# Patient Record
Sex: FEMALE | Race: WHITE | NOT HISPANIC OR LATINO | Employment: OTHER | URBAN - NONMETROPOLITAN AREA
[De-identification: names, ages, dates, MRNs, and addresses within clinical notes are randomized per-mention and may not be internally consistent; named-entity substitution may affect disease eponyms.]

---

## 2018-01-02 ENCOUNTER — TRANSCRIBE ORDERS (OUTPATIENT)
Dept: ADMINISTRATIVE | Facility: HOSPITAL | Age: 53
End: 2018-01-02

## 2018-01-02 DIAGNOSIS — E11.00 TYPE 2 DIABETES MELLITUS WITH HYPEROSMOLARITY WITHOUT COMA, WITH LONG-TERM CURRENT USE OF INSULIN (HCC): Primary | ICD-10-CM

## 2018-01-02 DIAGNOSIS — Z79.4 TYPE 2 DIABETES MELLITUS WITH HYPEROSMOLARITY WITHOUT COMA, WITH LONG-TERM CURRENT USE OF INSULIN (HCC): Primary | ICD-10-CM

## 2018-01-12 ENCOUNTER — TRANSCRIBE ORDERS (OUTPATIENT)
Dept: ADMINISTRATIVE | Facility: HOSPITAL | Age: 53
End: 2018-01-12

## 2018-01-12 DIAGNOSIS — E13.9 DIABETES 1.5, MANAGED AS TYPE 2 (HCC): Primary | ICD-10-CM

## 2018-01-16 ENCOUNTER — APPOINTMENT (OUTPATIENT)
Dept: DIABETES SERVICES | Facility: HOSPITAL | Age: 53
End: 2018-01-16

## 2018-01-18 ENCOUNTER — APPOINTMENT (OUTPATIENT)
Dept: DIABETES SERVICES | Facility: HOSPITAL | Age: 53
End: 2018-01-18

## 2018-01-24 ENCOUNTER — TRANSCRIBE ORDERS (OUTPATIENT)
Dept: ADMINISTRATIVE | Facility: HOSPITAL | Age: 53
End: 2018-01-24

## 2018-01-24 DIAGNOSIS — S46.012A TRAUMATIC TEAR OF LEFT ROTATOR CUFF, INITIAL ENCOUNTER: Primary | ICD-10-CM

## 2018-01-25 ENCOUNTER — HOSPITAL ENCOUNTER (OUTPATIENT)
Dept: MRI IMAGING | Facility: HOSPITAL | Age: 53
Discharge: HOME | End: 2018-01-25
Attending: ORTHOPAEDIC SURGERY | Admitting: ORTHOPAEDIC SURGERY

## 2018-01-25 DIAGNOSIS — S46.012A TRAUMATIC TEAR OF LEFT ROTATOR CUFF, INITIAL ENCOUNTER: ICD-10-CM

## 2018-01-25 PROCEDURE — 73221 MRI JOINT UPR EXTREM W/O DYE: CPT

## 2018-01-25 PROCEDURE — 73221 MRI JOINT UPR EXTREM W/O DYE: CPT | Performed by: RADIOLOGY

## 2018-05-31 ENCOUNTER — TRANSCRIBE ORDERS (OUTPATIENT)
Dept: ADMINISTRATIVE | Facility: HOSPITAL | Age: 53
End: 2018-05-31

## 2018-05-31 DIAGNOSIS — R74.8 ABNORMAL LEVELS OF OTHER SERUM ENZYMES: Primary | ICD-10-CM

## 2018-06-15 ENCOUNTER — HOSPITAL ENCOUNTER (OUTPATIENT)
Dept: ULTRASOUND IMAGING | Facility: HOSPITAL | Age: 53
Discharge: HOME | End: 2018-06-15

## 2018-09-17 ENCOUNTER — OFFICE VISIT (OUTPATIENT)
Dept: RETAIL CLINIC | Facility: CLINIC | Age: 53
End: 2018-09-17

## 2018-09-17 VITALS
DIASTOLIC BLOOD PRESSURE: 78 MMHG | TEMPERATURE: 99.5 F | WEIGHT: 292 LBS | RESPIRATION RATE: 18 BRPM | OXYGEN SATURATION: 98 % | BODY MASS INDEX: 47.13 KG/M2 | HEART RATE: 73 BPM | SYSTOLIC BLOOD PRESSURE: 114 MMHG

## 2018-09-17 DIAGNOSIS — J01.00 ACUTE MAXILLARY SINUSITIS, RECURRENCE NOT SPECIFIED: Primary | ICD-10-CM

## 2018-09-17 PROCEDURE — 99213 OFFICE O/P EST LOW 20 MIN: CPT | Performed by: NURSE PRACTITIONER

## 2018-09-17 RX ORDER — PAROXETINE HYDROCHLORIDE 20 MG/1
40 TABLET, FILM COATED ORAL EVERY MORNING
COMMUNITY
End: 2021-04-21

## 2018-09-17 RX ORDER — LORAZEPAM 1 MG/1
1 TABLET ORAL EVERY 8 HOURS PRN
Status: ON HOLD | COMMUNITY
End: 2019-10-24

## 2018-09-17 RX ORDER — PIOGLITAZONEHYDROCHLORIDE 30 MG/1
30 TABLET ORAL DAILY
Status: ON HOLD | COMMUNITY
End: 2019-10-24

## 2018-09-17 RX ORDER — AZITHROMYCIN 250 MG/1
TABLET, FILM COATED ORAL
Qty: 6 TABLET | Refills: 0 | Status: SHIPPED | OUTPATIENT
Start: 2018-09-17 | End: 2019-02-01

## 2018-09-17 RX ORDER — FLUTICASONE PROPIONATE 50 MCG
2 SPRAY, SUSPENSION (ML) NASAL DAILY
Qty: 1 BOTTLE | Refills: 0 | Status: SHIPPED | OUTPATIENT
Start: 2018-09-17 | End: 2018-10-17

## 2018-09-17 NOTE — PATIENT INSTRUCTIONS

## 2018-09-17 NOTE — PROGRESS NOTES
Subjective   Ivette Khalil is a 52 y.o. female.   Chief Complaint   Patient presents with   • URI      URI    This is a new problem. The problem has been gradually worsening. Maximum temperature: has not checked her temperature, but flet like she has had one. The fever has been present for 1 to 2 days. Associated symptoms include congestion (head/chest), coughing (non-productive) and headaches. Pertinent negatives include no chest pain, rash, rhinorrhea, sinus pain or sore throat. Treatments tried: antibiotics and steroid injection. The treatment provided no relief.        Ivtete Khalil  presents to [facility] with cc of cough, congestion for 2 weeks and has had chilling for couple of days.  Ivette says her PCP had given her a Rocephin injection along with a Steroid injection and 10 day script of Levaquin about 2 weeks ago and she is still not any better. Reviewed the [facility]. Immunizations are up to date. See ROS.    The following portions of the patient's history were reviewed and updated as appropriate: allergies, current medications, past family history, past medical history, past social history, past surgical history and problem list.    Review of Systems   Constitutional: Positive for chills and fever.   HENT: Positive for congestion (head/chest). Negative for rhinorrhea, sinus pain and sore throat.    Respiratory: Positive for cough (non-productive) and shortness of breath (on occassion). Negative for chest tightness.    Cardiovascular: Negative for chest pain.   Endocrine: Negative for cold intolerance.   Musculoskeletal: Negative for arthralgias.   Skin: Negative for rash.   Neurological: Positive for headaches.   Hematological: Negative for adenopathy.       Visit Vitals  /78 (BP Location: Right arm, Patient Position: Sitting)   Pulse 73   Temp 99.5 °F (37.5 °C) (Temporal Artery )   Resp 18   Wt 132 kg (292 lb)   SpO2 98%   BMI 47.13 kg/m²       Objective     Current Outpatient Prescriptions:   •  GLIMEPIRIDE  PO, Take  by mouth., Disp: , Rfl:   •  LORazepam (ATIVAN) 1 MG tablet, Take 1 mg by mouth Every 8 (Eight) Hours As Needed for Anxiety., Disp: , Rfl:   •  metoprolol succinate XL (TOPROL-XL) 50 MG 24 hr tablet, Take 50 mg by mouth daily., Disp: , Rfl:   •  PARoxetine (PAXIL) 20 MG tablet, Take 20 mg by mouth Every Morning., Disp: , Rfl:   •  pioglitazone (ACTOS) 30 MG tablet, Take 30 mg by mouth Daily., Disp: , Rfl:   •  azithromycin (ZITHROMAX Z-ABDIEL) 250 MG tablet, Take 2 tablets the first day, then 1 tablet daily for 4 days., Disp: 6 tablet, Rfl: 0  •  fluticasone (FLONASE) 50 MCG/ACT nasal spray, 2 sprays into the nostril(s) as directed by provider Daily for 30 days., Disp: 1 bottle, Rfl: 0  Allergies   Allergen Reactions   • Codeine Hives   • Penicillins Hives   • Prednisone Hives       Physical Exam   Constitutional: She is oriented to person, place, and time. She appears well-developed and well-nourished. No distress.   HENT:   Head: Normocephalic and atraumatic.   Right Ear: Tympanic membrane and external ear normal.   Left Ear: External ear normal. No tenderness. Tympanic membrane is bulging (fluid bubble noted). Tympanic membrane is not erythematous.   Nose: Mucosal edema present. Right sinus exhibits maxillary sinus tenderness. Right sinus exhibits no frontal sinus tenderness. Left sinus exhibits maxillary sinus tenderness. Left sinus exhibits no frontal sinus tenderness.   Mouth/Throat: Uvula is midline, oropharynx is clear and moist and mucous membranes are normal. No tonsillar abscesses.   Eyes: Pupils are equal, round, and reactive to light. Conjunctivae and EOM are normal.   Neck: Normal range of motion. Neck supple.   Cardiovascular: Normal rate, regular rhythm and normal heart sounds.    Pulmonary/Chest: Effort normal and breath sounds normal. No respiratory distress. She has no wheezes.   Abdominal: Soft. Bowel sounds are normal.   Musculoskeletal: Normal range of motion.   Lymphadenopathy:     She  has no cervical adenopathy.   Neurological: She is alert and oriented to person, place, and time.   Skin: Skin is warm and dry. No rash noted.   Psychiatric: She has a normal mood and affect. Her behavior is normal. Judgment and thought content normal.   Nursing note and vitals reviewed.      Lab Results (last 24 hours)     ** No results found for the last 24 hours. **          Assessment/Plan   Ivette was seen today for uri.    Diagnoses and all orders for this visit:    Acute maxillary sinusitis, recurrence not specified  -     azithromycin (ZITHROMAX Z-ABDIEL) 250 MG tablet; Take 2 tablets the first day, then 1 tablet daily for 4 days.  -     fluticasone (FLONASE) 50 MCG/ACT nasal spray; 2 sprays into the nostril(s) as directed by provider Daily for 30 days.

## 2019-02-01 ENCOUNTER — OFFICE VISIT (OUTPATIENT)
Dept: RETAIL CLINIC | Facility: CLINIC | Age: 54
End: 2019-02-01

## 2019-02-01 VITALS
BODY MASS INDEX: 47.61 KG/M2 | RESPIRATION RATE: 20 BRPM | HEART RATE: 69 BPM | TEMPERATURE: 98.5 F | SYSTOLIC BLOOD PRESSURE: 126 MMHG | DIASTOLIC BLOOD PRESSURE: 76 MMHG | OXYGEN SATURATION: 98 % | WEIGHT: 293 LBS

## 2019-02-01 DIAGNOSIS — J01.00 ACUTE MAXILLARY SINUSITIS, RECURRENCE NOT SPECIFIED: Primary | ICD-10-CM

## 2019-02-01 DIAGNOSIS — K12.0 APHTHOUS ULCER: ICD-10-CM

## 2019-02-01 PROCEDURE — 96372 THER/PROPH/DIAG INJ SC/IM: CPT | Performed by: NURSE PRACTITIONER

## 2019-02-01 PROCEDURE — 99213 OFFICE O/P EST LOW 20 MIN: CPT | Performed by: NURSE PRACTITIONER

## 2019-02-01 RX ORDER — BENZONATATE 100 MG/1
200 CAPSULE ORAL 3 TIMES DAILY PRN
Qty: 40 CAPSULE | Refills: 0 | Status: SHIPPED | OUTPATIENT
Start: 2019-02-01 | End: 2021-01-13

## 2019-02-01 RX ORDER — ONDANSETRON 4 MG/1
4 TABLET, ORALLY DISINTEGRATING ORAL EVERY 8 HOURS PRN
Qty: 12 TABLET | Refills: 0 | Status: SHIPPED | OUTPATIENT
Start: 2019-02-01 | End: 2020-09-17

## 2019-02-01 RX ORDER — AZITHROMYCIN 250 MG/1
TABLET, FILM COATED ORAL
Qty: 6 TABLET | Refills: 0 | Status: SHIPPED | OUTPATIENT
Start: 2019-02-01 | End: 2020-10-14

## 2019-02-01 RX ORDER — DIPHENHYDRAMINE, LIDOCAINE, NYSTATIN
5 KIT ORAL 4 TIMES DAILY
Qty: 100 ML | Refills: 0 | Status: SHIPPED | OUTPATIENT
Start: 2019-02-01 | End: 2019-02-06

## 2019-02-01 RX ORDER — CEFTRIAXONE 1 G/1
1 INJECTION, POWDER, FOR SOLUTION INTRAMUSCULAR; INTRAVENOUS ONCE
Status: COMPLETED | OUTPATIENT
Start: 2019-02-01 | End: 2019-02-01

## 2019-02-01 RX ADMIN — CEFTRIAXONE 1 G: 1 INJECTION, POWDER, FOR SOLUTION INTRAMUSCULAR; INTRAVENOUS at 08:30

## 2019-02-01 NOTE — PROGRESS NOTES
Subjective   Ivette Khalil is a 53 y.o. female.   Chief Complaint   Patient presents with   • URI      URI    This is a new problem. The current episode started in the past 7 days. The problem has been gradually worsening. There has been no fever. Associated symptoms include congestion, coughing, headaches, sinus pain and a sore throat. Associated symptoms comments: nausea. She has tried NSAIDs for the symptoms. The treatment provided no relief.        The following portions of the patient's history were reviewed and updated as appropriate: allergies, current medications, past family history, past medical history, past social history, past surgical history and problem list.    Review of Systems   Constitutional: Positive for chills and fatigue. Negative for fever.   HENT: Positive for congestion, postnasal drip, sinus pressure, sinus pain and sore throat.    Eyes: Negative.    Respiratory: Positive for cough.    Gastrointestinal: Negative.    Genitourinary: Negative.    Skin: Negative.    Allergic/Immunologic: Negative.    Neurological: Positive for headaches.   Psychiatric/Behavioral: Negative.    All other systems reviewed and are negative.      Objective   Allergies   Allergen Reactions   • Codeine Hives   • Penicillins Hives   • Prednisone Hives       Physical Exam   Constitutional: She is oriented to person, place, and time. She appears well-developed and well-nourished.   HENT:   Right Ear: A middle ear effusion is present.   Left Ear: A middle ear effusion is present.   Nose: Mucosal edema present. Right sinus exhibits maxillary sinus tenderness. Left sinus exhibits maxillary sinus tenderness.   Mouth/Throat: Posterior oropharyngeal erythema present. No oropharyngeal exudate.   Aphthous ulcers, mucoid PND   Eyes: Pupils are equal, round, and reactive to light.   Neck: Neck supple.   Cardiovascular: Normal rate and regular rhythm.   Pulmonary/Chest: Effort normal and breath sounds normal.   Lymphadenopathy:      She has no cervical adenopathy.   Neurological: She is alert and oriented to person, place, and time.   Skin: Skin is warm and dry. No rash noted.   Psychiatric: She has a normal mood and affect.   Vitals reviewed.      Assessment/Plan   Ivette was seen today for uri.    Diagnoses and all orders for this visit:    Acute maxillary sinusitis, recurrence not specified  -     cefTRIAXone (ROCEPHIN) injection 1 g; Inject 1 g into the appropriate muscle as directed by prescriber 1 (One) Time.    Aphthous ulcer    Other orders  -     azithromycin (ZITHROMAX) 250 MG tablet; Take 2 tablets the first day, then 1 tablet daily for 4 days.  -     ondansetron ODT (ZOFRAN-ODT) 4 MG disintegrating tablet; Take 1 tablet by mouth Every 8 (Eight) Hours As Needed for Nausea or Vomiting.  -     benzonatate (TESSALON) 100 MG capsule; Take 2 capsules by mouth 3 (Three) Times a Day As Needed for Cough.  -     nystatin susp + lidocaine viscous (MAGIC MOUTHWASH) oral suspension; Swish and swallow 5 mL 4 (Four) Times a Day for 5 days. Equal parts nystatin, lidocaine, maalox, and benadryl                 This document has been electronically signed by DAVI Corral February 1, 2019 9:16 AM

## 2019-09-16 ENCOUNTER — LAB (OUTPATIENT)
Dept: LAB | Facility: HOSPITAL | Age: 54
End: 2019-09-16

## 2019-09-16 ENCOUNTER — TRANSCRIBE ORDERS (OUTPATIENT)
Dept: ADMINISTRATIVE | Facility: HOSPITAL | Age: 54
End: 2019-09-16

## 2019-09-16 DIAGNOSIS — E11.8 TYPE 2 DIABETES MELLITUS WITH COMPLICATION, UNSPECIFIED WHETHER LONG TERM INSULIN USE: ICD-10-CM

## 2019-09-16 DIAGNOSIS — I10 HYPERTENSION, UNSPECIFIED TYPE: ICD-10-CM

## 2019-09-16 DIAGNOSIS — E78.5 HYPERLIPIDEMIA, UNSPECIFIED HYPERLIPIDEMIA TYPE: ICD-10-CM

## 2019-09-16 DIAGNOSIS — E03.9 HYPOTHYROIDISM, UNSPECIFIED TYPE: Primary | ICD-10-CM

## 2019-09-16 DIAGNOSIS — R53.82 CHRONIC FATIGUE, UNSPECIFIED: ICD-10-CM

## 2019-09-16 DIAGNOSIS — R53.83 FATIGUE, UNSPECIFIED TYPE: ICD-10-CM

## 2019-09-16 DIAGNOSIS — E03.9 HYPOTHYROIDISM, UNSPECIFIED TYPE: ICD-10-CM

## 2019-09-16 LAB
25(OH)D3 SERPL-MCNC: 32.7 NG/ML (ref 30–100)
ALBUMIN SERPL-MCNC: 4.1 G/DL (ref 3.5–5.2)
ALBUMIN UR-MCNC: <1.2 MG/DL
ALBUMIN/GLOB SERPL: 1.2 G/DL
ALP SERPL-CCNC: 147 U/L (ref 39–117)
ALT SERPL W P-5'-P-CCNC: 33 U/L (ref 1–33)
ANION GAP SERPL CALCULATED.3IONS-SCNC: 10.8 MMOL/L (ref 5–15)
AST SERPL-CCNC: 27 U/L (ref 1–32)
BASOPHILS # BLD AUTO: 0.04 10*3/MM3 (ref 0–0.2)
BASOPHILS NFR BLD AUTO: 0.5 % (ref 0–1.5)
BILIRUB SERPL-MCNC: 0.2 MG/DL (ref 0.2–1.2)
BILIRUB UR QL STRIP: NEGATIVE
BUN BLD-MCNC: 13 MG/DL (ref 6–20)
BUN/CREAT SERPL: 19.4 (ref 7–25)
CALCIUM SPEC-SCNC: 9.6 MG/DL (ref 8.6–10.5)
CHLORIDE SERPL-SCNC: 95 MMOL/L (ref 98–107)
CHOLEST SERPL-MCNC: 184 MG/DL (ref 0–200)
CLARITY UR: CLEAR
CO2 SERPL-SCNC: 27.2 MMOL/L (ref 22–29)
COLOR UR: YELLOW
CREAT BLD-MCNC: 0.67 MG/DL (ref 0.57–1)
CREAT UR-MCNC: 77.2 MG/DL
DEPRECATED RDW RBC AUTO: 48.6 FL (ref 37–54)
EOSINOPHIL # BLD AUTO: 0.18 10*3/MM3 (ref 0–0.4)
EOSINOPHIL NFR BLD AUTO: 2.3 % (ref 0.3–6.2)
ERYTHROCYTE [DISTWIDTH] IN BLOOD BY AUTOMATED COUNT: 15.3 % (ref 12.3–15.4)
GFR SERPL CREATININE-BSD FRML MDRD: 91 ML/MIN/1.73
GLOBULIN UR ELPH-MCNC: 3.5 GM/DL
GLUCOSE BLD-MCNC: 284 MG/DL (ref 65–99)
GLUCOSE UR STRIP-MCNC: ABNORMAL MG/DL
HBA1C MFR BLD: 9.54 % (ref 4.8–5.6)
HCT VFR BLD AUTO: 50.1 % (ref 34–46.6)
HDLC SERPL-MCNC: 41 MG/DL (ref 40–60)
HGB BLD-MCNC: 15.2 G/DL (ref 12–15.9)
HGB UR QL STRIP.AUTO: NEGATIVE
IMM GRANULOCYTES # BLD AUTO: 0.04 10*3/MM3 (ref 0–0.05)
IMM GRANULOCYTES NFR BLD AUTO: 0.5 % (ref 0–0.5)
KETONES UR QL STRIP: NEGATIVE
LDLC SERPL CALC-MCNC: 106 MG/DL (ref 0–100)
LDLC/HDLC SERPL: 2.6 {RATIO}
LEUKOCYTE ESTERASE UR QL STRIP.AUTO: NEGATIVE
LYMPHOCYTES # BLD AUTO: 2.1 10*3/MM3 (ref 0.7–3.1)
LYMPHOCYTES NFR BLD AUTO: 26.3 % (ref 19.6–45.3)
MCH RBC QN AUTO: 26.7 PG (ref 26.6–33)
MCHC RBC AUTO-ENTMCNC: 30.3 G/DL (ref 31.5–35.7)
MCV RBC AUTO: 87.9 FL (ref 79–97)
MICROALBUMIN/CREAT UR: NORMAL MG/G{CREAT}
MONOCYTES # BLD AUTO: 0.53 10*3/MM3 (ref 0.1–0.9)
MONOCYTES NFR BLD AUTO: 6.6 % (ref 5–12)
NEUTROPHILS # BLD AUTO: 5.08 10*3/MM3 (ref 1.7–7)
NEUTROPHILS NFR BLD AUTO: 63.8 % (ref 42.7–76)
NITRITE UR QL STRIP: NEGATIVE
NRBC BLD AUTO-RTO: 0 /100 WBC (ref 0–0.2)
PH UR STRIP.AUTO: 5.5 [PH] (ref 5–8)
PLATELET # BLD AUTO: 308 10*3/MM3 (ref 140–450)
PMV BLD AUTO: 9.5 FL (ref 6–12)
POTASSIUM BLD-SCNC: 4.4 MMOL/L (ref 3.5–5.2)
PROT SERPL-MCNC: 7.6 G/DL (ref 6–8.5)
PROT UR QL STRIP: NEGATIVE
RBC # BLD AUTO: 5.7 10*6/MM3 (ref 3.77–5.28)
SODIUM BLD-SCNC: 133 MMOL/L (ref 136–145)
SP GR UR STRIP: 1.03 (ref 1–1.03)
T3 SERPL-MCNC: 132 NG/DL (ref 80–200)
T4 SERPL-MCNC: 8.34 MCG/DL (ref 4.5–11.7)
TRIGL SERPL-MCNC: 183 MG/DL (ref 0–150)
TSH SERPL DL<=0.05 MIU/L-ACNC: 2.83 UIU/ML (ref 0.27–4.2)
UROBILINOGEN UR QL STRIP: ABNORMAL
VLDLC SERPL-MCNC: 36.6 MG/DL (ref 5–40)
WBC NRBC COR # BLD: 7.97 10*3/MM3 (ref 3.4–10.8)

## 2019-09-16 PROCEDURE — 82043 UR ALBUMIN QUANTITATIVE: CPT

## 2019-09-16 PROCEDURE — 85025 COMPLETE CBC W/AUTO DIFF WBC: CPT

## 2019-09-16 PROCEDURE — 80053 COMPREHEN METABOLIC PANEL: CPT

## 2019-09-16 PROCEDURE — 36415 COLL VENOUS BLD VENIPUNCTURE: CPT

## 2019-09-16 PROCEDURE — 80061 LIPID PANEL: CPT

## 2019-09-16 PROCEDURE — 81003 URINALYSIS AUTO W/O SCOPE: CPT

## 2019-09-16 PROCEDURE — 83036 HEMOGLOBIN GLYCOSYLATED A1C: CPT

## 2019-09-16 PROCEDURE — 82570 ASSAY OF URINE CREATININE: CPT

## 2019-09-16 PROCEDURE — 82306 VITAMIN D 25 HYDROXY: CPT

## 2019-09-16 PROCEDURE — 84436 ASSAY OF TOTAL THYROXINE: CPT

## 2019-09-16 PROCEDURE — 84480 ASSAY TRIIODOTHYRONINE (T3): CPT

## 2019-09-16 PROCEDURE — 84443 ASSAY THYROID STIM HORMONE: CPT

## 2019-10-23 ENCOUNTER — ANESTHESIA EVENT (OUTPATIENT)
Dept: PERIOP | Facility: HOSPITAL | Age: 54
End: 2019-10-23

## 2019-10-23 RX ORDER — SODIUM CHLORIDE 0.9 % (FLUSH) 0.9 %
3-10 SYRINGE (ML) INJECTION AS NEEDED
Status: CANCELLED | OUTPATIENT
Start: 2019-10-23

## 2019-10-23 RX ORDER — SODIUM CHLORIDE 0.9 % (FLUSH) 0.9 %
3 SYRINGE (ML) INJECTION EVERY 12 HOURS SCHEDULED
Status: CANCELLED | OUTPATIENT
Start: 2019-10-23

## 2019-10-24 ENCOUNTER — HOSPITAL ENCOUNTER (OUTPATIENT)
Facility: HOSPITAL | Age: 54
Discharge: HOME | End: 2019-10-25
Attending: SURGERY | Admitting: SURGERY

## 2019-10-24 ENCOUNTER — ANESTHESIA (OUTPATIENT)
Dept: PERIOP | Facility: HOSPITAL | Age: 54
End: 2019-10-24

## 2019-10-24 DIAGNOSIS — E04.2 MULTIPLE THYROID NODULES: ICD-10-CM

## 2019-10-24 LAB
GLUCOSE BLD-MCNC: 462 MG/DL (ref 65–99)
GLUCOSE BLDC GLUCOMTR-MCNC: 163 MG/DL (ref 70–130)
GLUCOSE BLDC GLUCOMTR-MCNC: 172 MG/DL (ref 70–130)
GLUCOSE BLDC GLUCOMTR-MCNC: 209 MG/DL (ref 70–130)
GLUCOSE BLDC GLUCOMTR-MCNC: 374 MG/DL (ref 70–130)
GLUCOSE BLDC GLUCOMTR-MCNC: 436 MG/DL (ref 70–130)
GLUCOSE BLDC GLUCOMTR-MCNC: 462 MG/DL (ref 70–130)
GLUCOSE BLDC GLUCOMTR-MCNC: 469 MG/DL (ref 70–130)
GLUCOSE BLDC GLUCOMTR-MCNC: 529 MG/DL (ref 70–130)

## 2019-10-24 PROCEDURE — 93010 ELECTROCARDIOGRAM REPORT: CPT | Performed by: INTERNAL MEDICINE

## 2019-10-24 PROCEDURE — 63710000001 INSULIN DETEMIR PER 5 UNITS: Performed by: SURGERY

## 2019-10-24 PROCEDURE — 25010000003 CEFAZOLIN 1-4 GM/50ML-% SOLUTION: Performed by: NURSE ANESTHETIST, CERTIFIED REGISTERED

## 2019-10-24 PROCEDURE — 82947 ASSAY GLUCOSE BLOOD QUANT: CPT

## 2019-10-24 PROCEDURE — 25010000002 FENTANYL CITRATE (PF) 100 MCG/2ML SOLUTION: Performed by: NURSE ANESTHETIST, CERTIFIED REGISTERED

## 2019-10-24 PROCEDURE — 25010000002 DEXAMETHASONE PER 1 MG: Performed by: NURSE ANESTHETIST, CERTIFIED REGISTERED

## 2019-10-24 PROCEDURE — 63710000001 INSULIN LISPRO (HUMAN) PER 5 UNITS

## 2019-10-24 PROCEDURE — G0378 HOSPITAL OBSERVATION PER HR: HCPCS

## 2019-10-24 PROCEDURE — 88307 TISSUE EXAM BY PATHOLOGIST: CPT | Performed by: SURGERY

## 2019-10-24 PROCEDURE — 25010000002 ONDANSETRON PER 1 MG: Performed by: NURSE ANESTHETIST, CERTIFIED REGISTERED

## 2019-10-24 PROCEDURE — 63710000001 INSULIN LISPRO (HUMAN) PER 5 UNITS: Performed by: SURGERY

## 2019-10-24 PROCEDURE — 82962 GLUCOSE BLOOD TEST: CPT

## 2019-10-24 PROCEDURE — 25010000002 PROPOFOL 10 MG/ML EMULSION: Performed by: NURSE ANESTHETIST, CERTIFIED REGISTERED

## 2019-10-24 PROCEDURE — 25010000002 NEOSTIGMINE 10 MG/10ML SOLUTION: Performed by: NURSE ANESTHETIST, CERTIFIED REGISTERED

## 2019-10-24 PROCEDURE — 25010000002 HYDROMORPHONE PER 4 MG: Performed by: NURSE ANESTHETIST, CERTIFIED REGISTERED

## 2019-10-24 PROCEDURE — 93005 ELECTROCARDIOGRAM TRACING: CPT | Performed by: ANESTHESIOLOGY

## 2019-10-24 PROCEDURE — 25010000002 PROMETHAZINE PER 50 MG: Performed by: NURSE ANESTHETIST, CERTIFIED REGISTERED

## 2019-10-24 DEVICE — CLIP LIGAT VASC HORIZON TI MD BLU 6CT: Type: IMPLANTABLE DEVICE | Site: NECK | Status: FUNCTIONAL

## 2019-10-24 DEVICE — CLIP LIGAT VASC HORIZON TI SM YEL 6CT: Type: IMPLANTABLE DEVICE | Site: NECK | Status: FUNCTIONAL

## 2019-10-24 RX ORDER — ROCURONIUM BROMIDE 10 MG/ML
INJECTION, SOLUTION INTRAVENOUS AS NEEDED
Status: DISCONTINUED | OUTPATIENT
Start: 2019-10-24 | End: 2019-10-24 | Stop reason: SURG

## 2019-10-24 RX ORDER — FAMOTIDINE 20 MG/1
20 TABLET, FILM COATED ORAL
Status: DISCONTINUED | OUTPATIENT
Start: 2019-10-24 | End: 2019-10-24 | Stop reason: HOSPADM

## 2019-10-24 RX ORDER — MAGNESIUM HYDROXIDE 1200 MG/15ML
LIQUID ORAL AS NEEDED
Status: DISCONTINUED | OUTPATIENT
Start: 2019-10-24 | End: 2019-10-24 | Stop reason: HOSPADM

## 2019-10-24 RX ORDER — HYDRALAZINE HYDROCHLORIDE 20 MG/ML
5 INJECTION INTRAMUSCULAR; INTRAVENOUS
Status: DISCONTINUED | OUTPATIENT
Start: 2019-10-24 | End: 2019-10-24 | Stop reason: HOSPADM

## 2019-10-24 RX ORDER — FENTANYL CITRATE 50 UG/ML
50 INJECTION, SOLUTION INTRAMUSCULAR; INTRAVENOUS
Status: DISCONTINUED | OUTPATIENT
Start: 2019-10-24 | End: 2019-10-24 | Stop reason: HOSPADM

## 2019-10-24 RX ORDER — GLYCOPYRROLATE 0.2 MG/ML
INJECTION INTRAMUSCULAR; INTRAVENOUS AS NEEDED
Status: DISCONTINUED | OUTPATIENT
Start: 2019-10-24 | End: 2019-10-24 | Stop reason: SURG

## 2019-10-24 RX ORDER — PAROXETINE HYDROCHLORIDE 20 MG/1
40 TABLET, FILM COATED ORAL EVERY MORNING
Status: DISCONTINUED | OUTPATIENT
Start: 2019-10-24 | End: 2019-10-25 | Stop reason: HOSPADM

## 2019-10-24 RX ORDER — LIDOCAINE HYDROCHLORIDE 10 MG/ML
INJECTION, SOLUTION EPIDURAL; INFILTRATION; INTRACAUDAL; PERINEURAL AS NEEDED
Status: DISCONTINUED | OUTPATIENT
Start: 2019-10-24 | End: 2019-10-24 | Stop reason: SURG

## 2019-10-24 RX ORDER — SODIUM CHLORIDE, SODIUM LACTATE, POTASSIUM CHLORIDE, CALCIUM CHLORIDE 600; 310; 30; 20 MG/100ML; MG/100ML; MG/100ML; MG/100ML
9 INJECTION, SOLUTION INTRAVENOUS CONTINUOUS PRN
Status: DISCONTINUED | OUTPATIENT
Start: 2019-10-24 | End: 2019-10-24 | Stop reason: HOSPADM

## 2019-10-24 RX ORDER — PROMETHAZINE HYDROCHLORIDE 25 MG/ML
6.25 INJECTION, SOLUTION INTRAMUSCULAR; INTRAVENOUS ONCE AS NEEDED
Status: COMPLETED | OUTPATIENT
Start: 2019-10-24 | End: 2019-10-24

## 2019-10-24 RX ORDER — IPRATROPIUM BROMIDE AND ALBUTEROL SULFATE 2.5; .5 MG/3ML; MG/3ML
3 SOLUTION RESPIRATORY (INHALATION) ONCE AS NEEDED
Status: DISCONTINUED | OUTPATIENT
Start: 2019-10-24 | End: 2019-10-24 | Stop reason: HOSPADM

## 2019-10-24 RX ORDER — NICOTINE POLACRILEX 4 MG
15 LOZENGE BUCCAL
Status: DISCONTINUED | OUTPATIENT
Start: 2019-10-24 | End: 2019-10-25 | Stop reason: HOSPADM

## 2019-10-24 RX ORDER — METOPROLOL SUCCINATE 50 MG/1
50 TABLET, EXTENDED RELEASE ORAL NIGHTLY
Status: DISCONTINUED | OUTPATIENT
Start: 2019-10-24 | End: 2019-10-25 | Stop reason: HOSPADM

## 2019-10-24 RX ORDER — HYDROMORPHONE HYDROCHLORIDE 1 MG/ML
0.5 INJECTION, SOLUTION INTRAMUSCULAR; INTRAVENOUS; SUBCUTANEOUS
Status: DISCONTINUED | OUTPATIENT
Start: 2019-10-24 | End: 2019-10-24 | Stop reason: HOSPADM

## 2019-10-24 RX ORDER — PROMETHAZINE HYDROCHLORIDE 25 MG/1
25 SUPPOSITORY RECTAL ONCE AS NEEDED
Status: COMPLETED | OUTPATIENT
Start: 2019-10-24 | End: 2019-10-24

## 2019-10-24 RX ORDER — PROPOFOL 10 MG/ML
VIAL (ML) INTRAVENOUS CONTINUOUS PRN
Status: DISCONTINUED | OUTPATIENT
Start: 2019-10-24 | End: 2019-10-24 | Stop reason: SURG

## 2019-10-24 RX ORDER — NEOSTIGMINE METHYLSULFATE 1 MG/ML
INJECTION, SOLUTION INTRAVENOUS AS NEEDED
Status: DISCONTINUED | OUTPATIENT
Start: 2019-10-24 | End: 2019-10-24 | Stop reason: SURG

## 2019-10-24 RX ORDER — ONDANSETRON 4 MG/1
4 TABLET, ORALLY DISINTEGRATING ORAL EVERY 8 HOURS PRN
Status: DISCONTINUED | OUTPATIENT
Start: 2019-10-24 | End: 2019-10-25 | Stop reason: HOSPADM

## 2019-10-24 RX ORDER — ONDANSETRON 2 MG/ML
4 INJECTION INTRAMUSCULAR; INTRAVENOUS ONCE AS NEEDED
Status: COMPLETED | OUTPATIENT
Start: 2019-10-24 | End: 2019-10-24

## 2019-10-24 RX ORDER — FENTANYL CITRATE 50 UG/ML
INJECTION, SOLUTION INTRAMUSCULAR; INTRAVENOUS AS NEEDED
Status: DISCONTINUED | OUTPATIENT
Start: 2019-10-24 | End: 2019-10-24 | Stop reason: SURG

## 2019-10-24 RX ORDER — SODIUM CHLORIDE 0.9 % (FLUSH) 0.9 %
3-10 SYRINGE (ML) INJECTION AS NEEDED
Status: DISCONTINUED | OUTPATIENT
Start: 2019-10-24 | End: 2019-10-25 | Stop reason: HOSPADM

## 2019-10-24 RX ORDER — ONDANSETRON 2 MG/ML
4 INJECTION INTRAMUSCULAR; INTRAVENOUS ONCE AS NEEDED
Status: DISCONTINUED | OUTPATIENT
Start: 2019-10-24 | End: 2019-10-24 | Stop reason: HOSPADM

## 2019-10-24 RX ORDER — LABETALOL HYDROCHLORIDE 5 MG/ML
5 INJECTION, SOLUTION INTRAVENOUS
Status: DISCONTINUED | OUTPATIENT
Start: 2019-10-24 | End: 2019-10-24 | Stop reason: HOSPADM

## 2019-10-24 RX ORDER — CEFAZOLIN SODIUM 1 G/50ML
INJECTION, SOLUTION INTRAVENOUS AS NEEDED
Status: DISCONTINUED | OUTPATIENT
Start: 2019-10-24 | End: 2019-10-24 | Stop reason: SURG

## 2019-10-24 RX ORDER — PANTOPRAZOLE SODIUM 40 MG/1
40 TABLET, DELAYED RELEASE ORAL DAILY
Status: ON HOLD | COMMUNITY
End: 2021-01-18

## 2019-10-24 RX ORDER — HEPARIN SODIUM 5000 [USP'U]/ML
5000 INJECTION, SOLUTION INTRAVENOUS; SUBCUTANEOUS EVERY 8 HOURS SCHEDULED
Status: DISCONTINUED | OUTPATIENT
Start: 2019-10-25 | End: 2019-10-25 | Stop reason: HOSPADM

## 2019-10-24 RX ORDER — SODIUM CHLORIDE, SODIUM LACTATE, POTASSIUM CHLORIDE, CALCIUM CHLORIDE 600; 310; 30; 20 MG/100ML; MG/100ML; MG/100ML; MG/100ML
INJECTION, SOLUTION INTRAVENOUS CONTINUOUS PRN
Status: DISCONTINUED | OUTPATIENT
Start: 2019-10-24 | End: 2019-10-24 | Stop reason: SURG

## 2019-10-24 RX ORDER — SODIUM CHLORIDE 0.9 % (FLUSH) 0.9 %
3 SYRINGE (ML) INJECTION EVERY 12 HOURS SCHEDULED
Status: DISCONTINUED | OUTPATIENT
Start: 2019-10-24 | End: 2019-10-25 | Stop reason: HOSPADM

## 2019-10-24 RX ORDER — LIDOCAINE HYDROCHLORIDE 10 MG/ML
0.5 INJECTION, SOLUTION EPIDURAL; INFILTRATION; INTRACAUDAL; PERINEURAL ONCE AS NEEDED
Status: COMPLETED | OUTPATIENT
Start: 2019-10-24 | End: 2019-10-24

## 2019-10-24 RX ORDER — PROMETHAZINE HYDROCHLORIDE 25 MG/1
25 TABLET ORAL ONCE AS NEEDED
Status: COMPLETED | OUTPATIENT
Start: 2019-10-24 | End: 2019-10-24

## 2019-10-24 RX ORDER — DEXTROSE MONOHYDRATE 25 G/50ML
25 INJECTION, SOLUTION INTRAVENOUS
Status: DISCONTINUED | OUTPATIENT
Start: 2019-10-24 | End: 2019-10-25 | Stop reason: HOSPADM

## 2019-10-24 RX ORDER — PANTOPRAZOLE SODIUM 40 MG/1
40 TABLET, DELAYED RELEASE ORAL
Status: DISCONTINUED | OUTPATIENT
Start: 2019-10-24 | End: 2019-10-25 | Stop reason: HOSPADM

## 2019-10-24 RX ORDER — DEXAMETHASONE SODIUM PHOSPHATE 4 MG/ML
INJECTION, SOLUTION INTRA-ARTICULAR; INTRALESIONAL; INTRAMUSCULAR; INTRAVENOUS; SOFT TISSUE AS NEEDED
Status: DISCONTINUED | OUTPATIENT
Start: 2019-10-24 | End: 2019-10-24 | Stop reason: SURG

## 2019-10-24 RX ORDER — HYDROCODONE BITARTRATE AND ACETAMINOPHEN 7.5; 325 MG/1; MG/1
1 TABLET ORAL EVERY 4 HOURS PRN
Status: DISCONTINUED | OUTPATIENT
Start: 2019-10-24 | End: 2019-10-25 | Stop reason: HOSPADM

## 2019-10-24 RX ORDER — PROPOFOL 10 MG/ML
VIAL (ML) INTRAVENOUS AS NEEDED
Status: DISCONTINUED | OUTPATIENT
Start: 2019-10-24 | End: 2019-10-24 | Stop reason: SURG

## 2019-10-24 RX ORDER — ONDANSETRON 2 MG/ML
INJECTION INTRAMUSCULAR; INTRAVENOUS AS NEEDED
Status: DISCONTINUED | OUTPATIENT
Start: 2019-10-24 | End: 2019-10-24 | Stop reason: SURG

## 2019-10-24 RX ORDER — MEPERIDINE HYDROCHLORIDE 25 MG/ML
12.5 INJECTION INTRAMUSCULAR; INTRAVENOUS; SUBCUTANEOUS
Status: DISCONTINUED | OUTPATIENT
Start: 2019-10-24 | End: 2019-10-24 | Stop reason: HOSPADM

## 2019-10-24 RX ADMIN — CEFAZOLIN SODIUM 1 G: 1 INJECTION, SOLUTION INTRAVENOUS at 08:18

## 2019-10-24 RX ADMIN — LIDOCAINE HYDROCHLORIDE 0.2 ML: 10 INJECTION, SOLUTION EPIDURAL; INFILTRATION; INTRACAUDAL; PERINEURAL at 06:55

## 2019-10-24 RX ADMIN — LIDOCAINE HYDROCHLORIDE 50 MG: 10 INJECTION, SOLUTION EPIDURAL; INFILTRATION; INTRACAUDAL; PERINEURAL at 08:10

## 2019-10-24 RX ADMIN — PROPOFOL 200 MG: 10 INJECTION, EMULSION INTRAVENOUS at 08:10

## 2019-10-24 RX ADMIN — SODIUM CHLORIDE, POTASSIUM CHLORIDE, SODIUM LACTATE AND CALCIUM CHLORIDE: 600; 310; 30; 20 INJECTION, SOLUTION INTRAVENOUS at 09:15

## 2019-10-24 RX ADMIN — INSULIN LISPRO 3 UNITS: 100 INJECTION, SOLUTION INTRAVENOUS; SUBCUTANEOUS at 13:00

## 2019-10-24 RX ADMIN — HYDROMORPHONE HYDROCHLORIDE 0.5 MG: 1 INJECTION, SOLUTION INTRAMUSCULAR; INTRAVENOUS; SUBCUTANEOUS at 09:46

## 2019-10-24 RX ADMIN — HYDROCODONE BITARTRATE AND ACETAMINOPHEN 1 TABLET: 7.5; 325 TABLET ORAL at 11:17

## 2019-10-24 RX ADMIN — FENTANYL CITRATE 50 MCG: 0.05 INJECTION, SOLUTION INTRAMUSCULAR; INTRAVENOUS at 09:39

## 2019-10-24 RX ADMIN — ONDANSETRON 4 MG: 2 INJECTION INTRAMUSCULAR; INTRAVENOUS at 09:13

## 2019-10-24 RX ADMIN — FENTANYL CITRATE 50 MCG: 50 INJECTION, SOLUTION INTRAMUSCULAR; INTRAVENOUS at 08:10

## 2019-10-24 RX ADMIN — HYDROCODONE BITARTRATE AND ACETAMINOPHEN 1 TABLET: 7.5; 325 TABLET ORAL at 19:23

## 2019-10-24 RX ADMIN — DEXAMETHASONE SODIUM PHOSPHATE 8 MG: 4 INJECTION, SOLUTION INTRAMUSCULAR; INTRAVENOUS at 08:19

## 2019-10-24 RX ADMIN — INSULIN LISPRO 7 UNITS: 100 INJECTION, SOLUTION INTRAVENOUS; SUBCUTANEOUS at 16:55

## 2019-10-24 RX ADMIN — NEOSTIGMINE METHYLSULFATE 3 MG: 1 INJECTION, SOLUTION INTRAVENOUS at 09:13

## 2019-10-24 RX ADMIN — PROPOFOL 25 MCG/KG/MIN: 10 INJECTION, EMULSION INTRAVENOUS at 08:22

## 2019-10-24 RX ADMIN — INSULIN DETEMIR 22 UNITS: 100 INJECTION, SOLUTION SUBCUTANEOUS at 22:26

## 2019-10-24 RX ADMIN — ROCURONIUM BROMIDE 50 MG: 10 INJECTION INTRAVENOUS at 08:10

## 2019-10-24 RX ADMIN — METOPROLOL SUCCINATE 50 MG: 50 TABLET, EXTENDED RELEASE ORAL at 22:21

## 2019-10-24 RX ADMIN — SODIUM CHLORIDE, POTASSIUM CHLORIDE, SODIUM LACTATE AND CALCIUM CHLORIDE 9 ML/HR: 600; 310; 30; 20 INJECTION, SOLUTION INTRAVENOUS at 06:55

## 2019-10-24 RX ADMIN — PANTOPRAZOLE SODIUM 40 MG: 40 TABLET, DELAYED RELEASE ORAL at 11:17

## 2019-10-24 RX ADMIN — HYDROCODONE BITARTRATE AND ACETAMINOPHEN 1 TABLET: 7.5; 325 TABLET ORAL at 23:40

## 2019-10-24 RX ADMIN — PAROXETINE HYDROCHLORIDE 40 MG: 20 TABLET, FILM COATED ORAL at 11:17

## 2019-10-24 RX ADMIN — HYDROCODONE BITARTRATE AND ACETAMINOPHEN 1 TABLET: 7.5; 325 TABLET ORAL at 15:08

## 2019-10-24 RX ADMIN — SODIUM CHLORIDE, PRESERVATIVE FREE 3 ML: 5 INJECTION INTRAVENOUS at 22:23

## 2019-10-24 RX ADMIN — PROMETHAZINE HYDROCHLORIDE 6.25 MG: 25 INJECTION INTRAMUSCULAR; INTRAVENOUS at 09:47

## 2019-10-24 RX ADMIN — ONDANSETRON 4 MG: 2 INJECTION INTRAMUSCULAR; INTRAVENOUS at 09:38

## 2019-10-24 RX ADMIN — INSULIN LISPRO 14 UNITS: 100 INJECTION, SOLUTION INTRAVENOUS; SUBCUTANEOUS at 22:23

## 2019-10-24 RX ADMIN — SODIUM CHLORIDE, POTASSIUM CHLORIDE, SODIUM LACTATE AND CALCIUM CHLORIDE: 600; 310; 30; 20 INJECTION, SOLUTION INTRAVENOUS at 08:04

## 2019-10-24 RX ADMIN — GLYCOPYRROLATE 0.4 MG: 0.2 INJECTION, SOLUTION INTRAMUSCULAR; INTRAVENOUS at 09:13

## 2019-10-24 RX ADMIN — FENTANYL CITRATE 50 MCG: 0.05 INJECTION, SOLUTION INTRAMUSCULAR; INTRAVENOUS at 10:27

## 2019-10-24 RX ADMIN — FAMOTIDINE 20 MG: 20 TABLET, FILM COATED ORAL at 07:11

## 2019-10-24 RX ADMIN — FENTANYL CITRATE 50 MCG: 50 INJECTION, SOLUTION INTRAMUSCULAR; INTRAVENOUS at 08:42

## 2019-10-24 NOTE — ANESTHESIA PREPROCEDURE EVALUATION
Anesthesia Evaluation     Patient summary reviewed and Nursing notes reviewed   NPO Solid Status: > 8 hours  NPO Liquid Status: > 2 hours           Airway   Mallampati: III  TM distance: >3 FB  Neck ROM: limited  Possible difficult intubation  Dental      Pulmonary    (+) sleep apnea,   (-) COPD, asthma, shortness of breath, not a smoker  Cardiovascular     ECG reviewed  Patient on routine beta blocker    (+) hypertension, hyperlipidemia,   (-) past MI, CAD, dysrhythmias, angina    ROS comment: EKG NSR  with SA IMI  Anterior infarct     ECHO EF = 60%. Wall contracts normally.  LVDD (grade I) consistent with impaired relaxation.    CATH No obstructive CAD. Arteries smooth in caliber s atherosclerosis, EF 71% no wall motion abnormalities    Neuro/Psych  (+) headaches,     (-) seizures, CVA  GI/Hepatic/Renal/Endo    (+) morbid obesity, GERD,  diabetes mellitus type 2 using insulin,   (-)  obesity, liver disease, no renal disease, hypothyroidism    Musculoskeletal     (+) back pain,   Abdominal    Substance History      OB/GYN          Other   (+) arthritis     ROS/Med Hx Other:                  Anesthesia Plan    ASA 3     general   (Propofol Infusion as part of Anti PONV tech )  intravenous induction   Anesthetic plan, all risks, benefits, and alternatives have been provided, discussed and informed consent has been obtained with: patient.

## 2019-10-24 NOTE — ANESTHESIA PROCEDURE NOTES
Airway  Urgency: elective    Airway not difficult    General Information and Staff    Patient location during procedure: OR  CRNA: Naila Duque CRNA    Indications and Patient Condition  Indications for airway management: airway protection    Preoxygenated: yes  MILS not maintained throughout  Mask difficulty assessment: 1 - vent by mask    Final Airway Details  Final airway type: endotracheal airway      Successful airway: ETT  Cuffed: yes   Successful intubation technique: direct laryngoscopy  Facilitating devices/methods: intubating stylet  Endotracheal tube insertion site: oral  Blade: Delgado  Blade size: 2  ETT size (mm): 7.0  Cormack-Lehane Classification: grade I - full view of glottis  Placement verified by: chest auscultation and capnometry   Measured from: lips  ETT/EBT  to lips (cm): 20  Number of attempts at approach: 1    Additional Comments  Negative epigastric sounds, Breath sound equal bilaterally with symmetric chest rise and fall.  Atraumatic, teeth intact

## 2019-10-24 NOTE — ANESTHESIA POSTPROCEDURE EVALUATION
Patient: Ivette Khalil    Procedure Summary     Date:  10/24/19 Room / Location:   [facility] / [facility]    Anesthesia Start:  0804 Anesthesia Stop:  0936    Procedure:  THYROIDECTOMY TOTAL (N/A Neck) Diagnosis:      Surgeon:  Layo Perera MD Provider:  Sam Thompson MD    Anesthesia Type:  general ASA Status:  3          Anesthesia Type: general  Last vitals  BP   151/84   Temp   97.0   Pulse   82   Resp   16     SpO2   100     Post Anesthesia Care and Evaluation    Patient location during evaluation: PACU  Patient participation: complete - patient participated  Level of consciousness: awake and alert  Pain score: 0  Pain management: adequate  Airway patency: patent  Anesthetic complications: No anesthetic complications  PONV Status: none  Cardiovascular status: hemodynamically stable and acceptable  Respiratory status: nonlabored ventilation, acceptable and nasal cannula  Hydration status: acceptable

## 2019-10-25 VITALS
RESPIRATION RATE: 18 BRPM | OXYGEN SATURATION: 96 % | HEART RATE: 78 BPM | WEIGHT: 268 LBS | SYSTOLIC BLOOD PRESSURE: 128 MMHG | TEMPERATURE: 97.9 F | HEIGHT: 66 IN | BODY MASS INDEX: 43.07 KG/M2 | DIASTOLIC BLOOD PRESSURE: 62 MMHG

## 2019-10-25 LAB
CA-I SERPL ISE-MCNC: 1.26 MMOL/L (ref 1.12–1.32)
CYTO UR: NORMAL
GLUCOSE BLDC GLUCOMTR-MCNC: 263 MG/DL (ref 70–130)
LAB AP CASE REPORT: NORMAL
LAB AP CLINICAL INFORMATION: NORMAL
PATH REPORT.FINAL DX SPEC: NORMAL
PATH REPORT.GROSS SPEC: NORMAL

## 2019-10-25 PROCEDURE — G0378 HOSPITAL OBSERVATION PER HR: HCPCS

## 2019-10-25 PROCEDURE — 82962 GLUCOSE BLOOD TEST: CPT

## 2019-10-25 PROCEDURE — 63710000001 INSULIN DETEMIR PER 5 UNITS: Performed by: SURGERY

## 2019-10-25 PROCEDURE — 63710000001 INSULIN LISPRO (HUMAN) PER 5 UNITS: Performed by: SURGERY

## 2019-10-25 PROCEDURE — 25010000002 HEPARIN (PORCINE) PER 1000 UNITS: Performed by: SURGERY

## 2019-10-25 PROCEDURE — 82330 ASSAY OF CALCIUM: CPT | Performed by: SURGERY

## 2019-10-25 RX ORDER — LEVOTHYROXINE SODIUM 0.15 MG/1
150 TABLET ORAL DAILY
Qty: 30 TABLET | Refills: 11 | Status: SHIPPED | OUTPATIENT
Start: 2019-10-25 | End: 2019-10-26 | Stop reason: SDUPTHER

## 2019-10-25 RX ADMIN — HYDROCODONE BITARTRATE AND ACETAMINOPHEN 1 TABLET: 7.5; 325 TABLET ORAL at 05:22

## 2019-10-25 RX ADMIN — HEPARIN SODIUM 5000 UNITS: 5000 INJECTION, SOLUTION INTRAVENOUS; SUBCUTANEOUS at 05:22

## 2019-10-25 RX ADMIN — INSULIN DETEMIR 22 UNITS: 100 INJECTION, SOLUTION SUBCUTANEOUS at 08:28

## 2019-10-25 RX ADMIN — HYDROCODONE BITARTRATE AND ACETAMINOPHEN 1 TABLET: 7.5; 325 TABLET ORAL at 09:27

## 2019-10-25 RX ADMIN — PAROXETINE HYDROCHLORIDE 40 MG: 20 TABLET, FILM COATED ORAL at 05:23

## 2019-10-25 RX ADMIN — INSULIN LISPRO 8 UNITS: 100 INJECTION, SOLUTION INTRAVENOUS; SUBCUTANEOUS at 08:27

## 2019-10-25 RX ADMIN — PANTOPRAZOLE SODIUM 40 MG: 40 TABLET, DELAYED RELEASE ORAL at 05:22

## 2019-10-26 RX ORDER — LEVOTHYROXINE SODIUM 0.15 MG/1
150 TABLET ORAL DAILY
Qty: 30 TABLET | Refills: 10 | Status: SHIPPED | OUTPATIENT
Start: 2019-10-26 | End: 2020-10-25

## 2019-12-31 ENCOUNTER — TRANSCRIBE ORDERS (OUTPATIENT)
Dept: ADMINISTRATIVE | Facility: HOSPITAL | Age: 54
End: 2019-12-31

## 2019-12-31 ENCOUNTER — LAB (OUTPATIENT)
Dept: LAB | Facility: HOSPITAL | Age: 54
End: 2019-12-31

## 2019-12-31 DIAGNOSIS — E66.9 OBESITY, UNSPECIFIED CLASSIFICATION, UNSPECIFIED OBESITY TYPE, UNSPECIFIED WHETHER SERIOUS COMORBIDITY PRESENT: ICD-10-CM

## 2019-12-31 DIAGNOSIS — E11.65 TYPE II DIABETES MELLITUS WITH HYPEROSMOLARITY, UNCONTROLLED (HCC): Primary | ICD-10-CM

## 2019-12-31 DIAGNOSIS — I10 HYPERTENSION, UNSPECIFIED TYPE: Primary | ICD-10-CM

## 2019-12-31 DIAGNOSIS — E11.9 TYPE 2 DIABETES MELLITUS WITHOUT COMPLICATION, UNSPECIFIED WHETHER LONG TERM INSULIN USE (HCC): ICD-10-CM

## 2019-12-31 DIAGNOSIS — E11.65 TYPE II DIABETES MELLITUS WITH HYPEROSMOLARITY, UNCONTROLLED (HCC): ICD-10-CM

## 2019-12-31 DIAGNOSIS — I25.10 CORONARY ARTERIOSCLEROSIS: ICD-10-CM

## 2019-12-31 DIAGNOSIS — E04.2 NONTOXIC MULTINODULAR GOITER: ICD-10-CM

## 2019-12-31 DIAGNOSIS — E11.00 TYPE II DIABETES MELLITUS WITH HYPEROSMOLARITY, UNCONTROLLED (HCC): ICD-10-CM

## 2019-12-31 DIAGNOSIS — E03.9 HYPOTHYROIDISM, ADULT: ICD-10-CM

## 2019-12-31 DIAGNOSIS — I10 HYPERTENSION, UNSPECIFIED TYPE: ICD-10-CM

## 2019-12-31 DIAGNOSIS — E11.00 TYPE II DIABETES MELLITUS WITH HYPEROSMOLARITY, UNCONTROLLED (HCC): Primary | ICD-10-CM

## 2019-12-31 PROCEDURE — 84443 ASSAY THYROID STIM HORMONE: CPT

## 2019-12-31 PROCEDURE — 84439 ASSAY OF FREE THYROXINE: CPT

## 2019-12-31 PROCEDURE — 84480 ASSAY TRIIODOTHYRONINE (T3): CPT

## 2019-12-31 PROCEDURE — 83036 HEMOGLOBIN GLYCOSYLATED A1C: CPT

## 2019-12-31 PROCEDURE — 82570 ASSAY OF URINE CREATININE: CPT

## 2019-12-31 PROCEDURE — 82043 UR ALBUMIN QUANTITATIVE: CPT

## 2019-12-31 PROCEDURE — 81001 URINALYSIS AUTO W/SCOPE: CPT

## 2019-12-31 PROCEDURE — 36415 COLL VENOUS BLD VENIPUNCTURE: CPT

## 2019-12-31 PROCEDURE — 80053 COMPREHEN METABOLIC PANEL: CPT

## 2019-12-31 PROCEDURE — 85025 COMPLETE CBC W/AUTO DIFF WBC: CPT

## 2019-12-31 PROCEDURE — 80061 LIPID PANEL: CPT

## 2020-01-01 LAB
ALBUMIN SERPL-MCNC: 3.9 G/DL (ref 3.5–5.2)
ALBUMIN UR-MCNC: <1.2 MG/DL
ALBUMIN/GLOB SERPL: 1.1 G/DL
ALP SERPL-CCNC: 143 U/L (ref 39–117)
ALT SERPL W P-5'-P-CCNC: 25 U/L (ref 1–33)
ANION GAP SERPL CALCULATED.3IONS-SCNC: 12 MMOL/L (ref 5–15)
AST SERPL-CCNC: 15 U/L (ref 1–32)
BACTERIA UR QL AUTO: ABNORMAL /HPF
BASOPHILS # BLD AUTO: 0.04 10*3/MM3 (ref 0–0.2)
BASOPHILS NFR BLD AUTO: 0.6 % (ref 0–1.5)
BILIRUB SERPL-MCNC: 0.2 MG/DL (ref 0.2–1.2)
BILIRUB UR QL STRIP: NEGATIVE
BUN BLD-MCNC: 10 MG/DL (ref 6–20)
BUN/CREAT SERPL: 16.7 (ref 7–25)
CALCIUM SPEC-SCNC: 8.9 MG/DL (ref 8.6–10.5)
CHLORIDE SERPL-SCNC: 93 MMOL/L (ref 98–107)
CHOLEST SERPL-MCNC: 198 MG/DL (ref 0–200)
CLARITY UR: CLEAR
CO2 SERPL-SCNC: 25 MMOL/L (ref 22–29)
COLOR UR: YELLOW
CREAT BLD-MCNC: 0.6 MG/DL (ref 0.57–1)
CREAT UR-MCNC: 88.9 MG/DL
DEPRECATED RDW RBC AUTO: 40.5 FL (ref 37–54)
EOSINOPHIL # BLD AUTO: 0.14 10*3/MM3 (ref 0–0.4)
EOSINOPHIL NFR BLD AUTO: 2 % (ref 0.3–6.2)
ERYTHROCYTE [DISTWIDTH] IN BLOOD BY AUTOMATED COUNT: 13.6 % (ref 12.3–15.4)
GFR SERPL CREATININE-BSD FRML MDRD: 103 ML/MIN/1.73
GLOBULIN UR ELPH-MCNC: 3.6 GM/DL
GLUCOSE BLD-MCNC: 246 MG/DL (ref 65–99)
GLUCOSE UR STRIP-MCNC: ABNORMAL MG/DL
HBA1C MFR BLD: 9.8 % (ref 4.8–5.6)
HCT VFR BLD AUTO: 43.2 % (ref 34–46.6)
HDLC SERPL-MCNC: 45 MG/DL (ref 40–60)
HGB BLD-MCNC: 14.1 G/DL (ref 12–15.9)
HGB UR QL STRIP.AUTO: NEGATIVE
HYALINE CASTS UR QL AUTO: ABNORMAL /LPF
IMM GRANULOCYTES # BLD AUTO: 0.03 10*3/MM3 (ref 0–0.05)
IMM GRANULOCYTES NFR BLD AUTO: 0.4 % (ref 0–0.5)
KETONES UR QL STRIP: NEGATIVE
LDLC SERPL CALC-MCNC: 115 MG/DL (ref 0–100)
LDLC/HDLC SERPL: 2.55 {RATIO}
LEUKOCYTE ESTERASE UR QL STRIP.AUTO: ABNORMAL
LYMPHOCYTES # BLD AUTO: 2.1 10*3/MM3 (ref 0.7–3.1)
LYMPHOCYTES NFR BLD AUTO: 30 % (ref 19.6–45.3)
MCH RBC QN AUTO: 27 PG (ref 26.6–33)
MCHC RBC AUTO-ENTMCNC: 32.6 G/DL (ref 31.5–35.7)
MCV RBC AUTO: 82.8 FL (ref 79–97)
MICROALBUMIN/CREAT UR: NORMAL MG/G{CREAT}
MONOCYTES # BLD AUTO: 0.41 10*3/MM3 (ref 0.1–0.9)
MONOCYTES NFR BLD AUTO: 5.9 % (ref 5–12)
NEUTROPHILS # BLD AUTO: 4.27 10*3/MM3 (ref 1.7–7)
NEUTROPHILS NFR BLD AUTO: 61.1 % (ref 42.7–76)
NITRITE UR QL STRIP: NEGATIVE
NRBC BLD AUTO-RTO: 0 /100 WBC (ref 0–0.2)
PH UR STRIP.AUTO: 5.5 [PH] (ref 5–8)
PLATELET # BLD AUTO: 282 10*3/MM3 (ref 140–450)
PMV BLD AUTO: 9.8 FL (ref 6–12)
POTASSIUM BLD-SCNC: 4.1 MMOL/L (ref 3.5–5.2)
PROT SERPL-MCNC: 7.5 G/DL (ref 6–8.5)
PROT UR QL STRIP: NEGATIVE
RBC # BLD AUTO: 5.22 10*6/MM3 (ref 3.77–5.28)
RBC # UR: ABNORMAL /HPF
REF LAB TEST METHOD: ABNORMAL
SODIUM BLD-SCNC: 130 MMOL/L (ref 136–145)
SP GR UR STRIP: 1.02 (ref 1–1.03)
SQUAMOUS #/AREA URNS HPF: ABNORMAL /HPF
T3 SERPL-MCNC: 112 NG/DL (ref 80–200)
T4 FREE SERPL-MCNC: 1.55 NG/DL (ref 0.93–1.7)
T4 SERPL-MCNC: 9.77 MCG/DL (ref 4.5–11.7)
TRIGL SERPL-MCNC: 192 MG/DL (ref 0–150)
TSH SERPL DL<=0.05 MIU/L-ACNC: 1.59 UIU/ML (ref 0.27–4.2)
UROBILINOGEN UR QL STRIP: ABNORMAL
VLDLC SERPL-MCNC: 38.4 MG/DL (ref 5–40)
WBC NRBC COR # BLD: 6.99 10*3/MM3 (ref 3.4–10.8)
WBC UR QL AUTO: ABNORMAL /HPF

## 2020-04-15 ENCOUNTER — HOSPITAL ENCOUNTER (OUTPATIENT)
Dept: MRI IMAGING | Facility: HOSPITAL | Age: 55
Discharge: HOME | End: 2020-04-15
Admitting: NURSE PRACTITIONER

## 2020-04-15 ENCOUNTER — TRANSCRIBE ORDERS (OUTPATIENT)
Dept: ADMINISTRATIVE | Facility: HOSPITAL | Age: 55
End: 2020-04-15

## 2020-04-15 DIAGNOSIS — M25.512 LEFT SHOULDER PAIN, UNSPECIFIED CHRONICITY: Primary | ICD-10-CM

## 2020-04-15 DIAGNOSIS — M25.512 LEFT SHOULDER PAIN, UNSPECIFIED CHRONICITY: ICD-10-CM

## 2020-04-15 PROCEDURE — 73221 MRI JOINT UPR EXTREM W/O DYE: CPT

## 2020-04-15 PROCEDURE — 73221 MRI JOINT UPR EXTREM W/O DYE: CPT | Performed by: RADIOLOGY

## 2020-04-20 ENCOUNTER — OFFICE VISIT (OUTPATIENT)
Dept: ORTHOPEDIC SURGERY | Facility: CLINIC | Age: 55
End: 2020-04-20

## 2020-04-20 VITALS — WEIGHT: 271.8 LBS | BODY MASS INDEX: 43.68 KG/M2 | HEIGHT: 66 IN | HEART RATE: 64 BPM | OXYGEN SATURATION: 98 %

## 2020-04-20 DIAGNOSIS — M25.512 LEFT SHOULDER PAIN, UNSPECIFIED CHRONICITY: Primary | ICD-10-CM

## 2020-04-20 DIAGNOSIS — S46.012A RUPTURE OF LEFT SUPRASPINATUS TENDON, INITIAL ENCOUNTER: ICD-10-CM

## 2020-04-20 DIAGNOSIS — E11.9 TYPE 2 DIABETES MELLITUS WITHOUT COMPLICATION, WITHOUT LONG-TERM CURRENT USE OF INSULIN (HCC): ICD-10-CM

## 2020-04-20 PROCEDURE — 99204 OFFICE O/P NEW MOD 45 MIN: CPT | Performed by: ORTHOPAEDIC SURGERY

## 2020-04-20 RX ORDER — OMEPRAZOLE 40 MG/1
40 CAPSULE, DELAYED RELEASE ORAL DAILY
COMMUNITY
End: 2021-01-13

## 2020-04-20 RX ORDER — NAPROXEN 500 MG/1
500 TABLET ORAL 2 TIMES DAILY WITH MEALS
COMMUNITY
End: 2020-09-23 | Stop reason: SDUPTHER

## 2020-04-20 RX ORDER — METOPROLOL TARTRATE 50 MG/1
50 TABLET, FILM COATED ORAL DAILY
COMMUNITY
Start: 2019-03-04 | End: 2021-04-02

## 2020-04-20 NOTE — PROGRESS NOTES
[facility]    Subjective     Chief Complaint   Patient presents with   • Left Shoulder - Pain        HPI      Ivette Khalil is a 54 y.o. female who presents with left shoulder pain.  Onset: atraumatic and gradual in nature. The issue has been ongoing for 8 month(s). Pain is a 10/10 on the pain scale. Pain is described as stabbing and shooting. Associated symptoms include pain, swelling, popping, grinding and giving way/buckling. The pain is worse with sleeping, working and leisure; nothing improve the pain. Previous treatments have included: NSAIDS.    I have reviewed the following portions of the patient's history:History of Present Illness    She has a history of a left humerus plating over 20 years ago.  She has had pain for the past year but worse the past 8 months    Past Medical History:   Diagnosis Date   • Arthritis    • Back pain    • Bronchitis    • Diabetes mellitus (CMS/[facility])    • Disease of thyroid gland    • Elevated cholesterol    • GERD (gastroesophageal reflux disease)    • Heart attack (CMS/HCC)    • History of diverticulosis    • History of ear infections    • Hypertension    • Migraine    • Obesity    • Sleep apnea     undiagnosed      Past Surgical History:   Procedure Laterality Date   • APPENDECTOMY      open   • BILATERAL BREAST REDUCTION     • BLADDER SURGERY      mesh removed 2nd to complications   • CARDIAC CATHETERIZATION N/A 2016    Procedure: Left Heart Cath- Right radial approach. ;  Surgeon: Shalom Giron MD;  Location: [facility] INVASIVE LOCATION;  Service:    •  SECTION     • CHOLECYSTECTOMY      laproscopic   • COLON RESECTION  2014    sigmoidectomy with multiple post operations and packing   • COLONOSCOPY     • HYSTERECTOMY      removal of both ovaries   • ORIF HUMERUS FRACTURE     • ORIF HUMERUS FRACTURE     • REDUCTION MAMMAPLASTY     • THYROIDECTOMY N/A 10/24/2019    Procedure: THYROIDECTOMY TOTAL;  Surgeon: Layo Perera,  MD;  Location: [facility];  Service: General      Family History   Problem Relation Age of Onset   • Diabetes Mother    • Heart disease Mother    • Hypertension Mother    • Other Father         black lung   • Diabetes Father    • Hypertension Father    • Heart disease Father    • COPD Father    • Diabetes Sister    • Heart disease Brother    • Heart attack Brother         Massive MI at 51     Social History     Socioeconomic History   • Marital status:      Spouse name: Not on file   • Number of children: 2   • Years of education: Not on file   • Highest education level: Not on file   Occupational History   • Occupation: disabled     Comment: previously worked in factory   Tobacco Use   • Smoking status: Never Smoker   • Smokeless tobacco: Never Used   Substance and Sexual Activity   • Alcohol use: No   • Drug use: No   • Sexual activity: Defer   Social History Narrative    Lives in [place], [place] with spouse and children      Current Outpatient Medications on File Prior to Visit   Medication Sig Dispense Refill   • Insulin Aspart (NOVOLOG SC) Inject 12 Units under the skin into the appropriate area as directed Daily.     • Insulin Degludec (TRESIBA SC) Inject 20 Units under the skin into the appropriate area as directed 2 (Two) Times a Day.     • levothyroxine (SYNTHROID) 150 MCG tablet Take 1 tablet by mouth Daily. 30 tablet 10   • metoprolol tartrate (LOPRESSOR) 50 MG tablet Take 50 mg by mouth Daily.     • naproxen (NAPROSYN) 500 MG tablet Take 500 mg by mouth 2 (Two) Times a Day With Meals.     • omeprazole (priLOSEC) 40 MG capsule Take 40 mg by mouth Daily.     • PARoxetine (PAXIL) 20 MG tablet Take 40 mg by mouth Every Morning.     • Semaglutide,0.25 or 0.5MG/DOS, (Ozempic, 0.25 or 0.5 MG/DOSE,) 2 MG/1.5ML solution pen-injector Inject 1.5 mg under the skin into the appropriate area as directed Daily.     • azithromycin (ZITHROMAX) 250 MG tablet Take 2 tablets the first day, then 1 tablet daily for 4  "days. 6 tablet 0   • benzonatate (TESSALON) 100 MG capsule Take 2 capsules by mouth 3 (Three) Times a Day As Needed for Cough. 40 capsule 0   • insulin detemir (LEVEMIR) 100 UNIT/ML injection Inject 22 Units under the skin into the appropriate area as directed 2 (Two) Times a Day.     • Liraglutide (VICTOZA SC) Inject 1.8 Units under the skin into the appropriate area as directed Every Morning.     • ondansetron ODT (ZOFRAN-ODT) 4 MG disintegrating tablet Take 1 tablet by mouth Every 8 (Eight) Hours As Needed for Nausea or Vomiting. 12 tablet 0   • pantoprazole (PROTONIX) 40 MG EC tablet Take 40 mg by mouth Daily.     • [DISCONTINUED] metoprolol succinate XL (TOPROL-XL) 50 MG 24 hr tablet Take 50 mg by mouth daily.       No current facility-administered medications on file prior to visit.       Allergies   Allergen Reactions   • Codeine Hives   • Penicillins Hives     Does ok with Keflex   • Prednisone Hives     Also, Swelling and skin redness        The following portions of the patient's history were reviewed and updated as appropriate: allergies, current medications, past family history, past medical history, past social history, past surgical history and problem list.    Review of Systems   Constitutional: Positive for activity change.   HENT: Negative.    Eyes: Negative.    Respiratory: Negative.    Cardiovascular: Negative.    Gastrointestinal: Negative.    Endocrine: Negative.    Genitourinary: Negative.    Musculoskeletal: Positive for arthralgias and neck pain.   Skin: Negative.    Allergic/Immunologic: Negative.    Neurological: Negative.    Hematological: Negative.    Psychiatric/Behavioral: Negative.         Objective      Physical Exam  Pulse 64   Ht 167.5 cm (65.95\")   Wt 123 kg (271 lb 12.8 oz)   SpO2 98%   BMI 43.94 kg/m²     Body mass index is 43.94 kg/m².    GENERAL APPEARANCE: awake, alert & oriented x 3, in no acute distress and well developed, well nourished  PSYCH: normal mood and " affect  LUNGS:  breathing nonlabored, no wheezing  EYES: sclera anicteric, pupils equal  CARDIOVASCULAR: palpable pulses dorsalis pedis, palpable posterior tibial bilaterally. Capillary refill less than 2 seconds  INTEGUMENTARY: skin intact, no clubbing, cyanosis  NEUROLOGIC:  Normal Sensation and reflexes       Ortho Exam  Musculoskeletal   Upper Extremity   Left Shoulder     Inspection and Palpation:     Tenderness - none     Crepitus - none    Sensation is normal    Examination reveals no ecchymosis.      Strength and Tone:    Supraspinatus, Infraspinatus - 4/5    Subscapularis - 4/5    Deltoid - 4/5   Range of Motion   Right shoulder:    Internal Rotation: ROM - T7    External Rotation: AROM - 80 degrees    Elevation through flexion: AROM - 180 degrees     Abduction - 180   Left Shoulder:    Internal Rotation: ROM -L5    External Rotation: AROM - 40 degrees    Elevation through flexion: AROM - 100 degrees     Abduction - 100 degrees   Instability   Left shoulder    Sulcus sign negative    Apprehension test negative    Meche relocation test negative    Jerk test negative   Impingement   Left shoulder    Hook impingement test positive    Neer impingement test positive   Functional Testing   Left shoulder    AC crossover adduction test negative    Abdominal compression test negative    Lift-off sign negative    Speed's test negative    Hendrickson's test negative    Horriblower's sign negative     Imaging/Studies  Imaging Results (Last 7 Days)     Procedure Component Value Units Date/Time    XR Shoulder 2+ View Left [[id number]] Resulted:  04/20/20 1051     Updated:  04/20/20 1052    Narrative:       Left Shoulder X-Ray  Indication: Pain  AP, scapular Y, and axillary lateral views    Findings: Evidence of prior humerus plate with no acute pathology  No fracture  No bony lesion  Normal soft tissues  Normal joint spaces    No prior studies were available for comparison.            I viewed the MRI from May 15 which shows  a 7 mm full-thickness tear of the supraspinatus  Assessment/Plan        ICD-10-CM ICD-9-CM   1. Left shoulder pain, unspecified chronicity M25.512 719.41   2. Rupture of left supraspinatus tendon, initial encounter S46.012A 840.6   3. Type 2 diabetes mellitus without complication, without long-term current use of insulin (CMS/[facility]) E11.9 250.00       Orders Placed This Encounter   Procedures   • XR Shoulder 2+ View Left      The plan will be for left shoulder arthroscopy with rotator cuff repair.  She is a slightly higher risk of developing a frozen shoulder with her diabetes.  She will keep that under control.Treatment options and alternatives were discussed with patient.  Surgical risks include but are not limited to pain, bleeding, infection, failure to relieve symptoms, need for further procedures, recurrence of symptoms, damage to healthy adjacent structures, hardware loosening/failure, stiffness, weakness, scar, blood clots/DVT/PE, loss of limb or life. We also discussed the postoperative protocol and expected outcome although no guarantees are possible with surgery. All questions were answered; the patient would like to proceed with surgical intervention.  Gave her a list of shoulder exercises to do.  We anticipate surgery in late May.  Medical Decision Making  Management Options : over-the-counter medicine, physical/occupational therapy and major surgery with risk factors  Data/Risk: radiology tests and independent visualization of imaging, lab tests, or EMG/NCV    Trent Yen MD  04/20/20  10:53         EMR Dragon/Transcription disclaimer:  Much of this encounter note is an electronic transcription of spoken language to printed text. Electronic transcription of spoken language may permit erroneous, or at times, nonsensical words or phrases to be inadvertently transcribed. Although I have reviewed the note for such errors, some may still exist.

## 2020-05-19 ENCOUNTER — TELEPHONE (OUTPATIENT)
Dept: ORTHOPEDIC SURGERY | Facility: CLINIC | Age: 55
End: 2020-05-19

## 2020-05-19 NOTE — TELEPHONE ENCOUNTER
CALLED PATIENT TO SCHEDULE L SHOULDER SCOPE W/ROTATOR CUFF SURGERY.  PATIENT WISHES TO HOLD OFF ON SURGERY AT THIS TIME DUE TO BEING ABLE TO MOVE SHOULDER MORE AND HAVING LESS PAIN AT THIS TIME.  SHE STATES IF AFTER SUMMER IF SHE IS HAVING MORE PROBLEMS WITH IT SHE WILL CALL TO SCHEDULE THE SURGERY.

## 2020-09-10 ENCOUNTER — TRANSCRIBE ORDERS (OUTPATIENT)
Dept: ADMINISTRATIVE | Facility: HOSPITAL | Age: 55
End: 2020-09-10

## 2020-09-10 ENCOUNTER — LAB (OUTPATIENT)
Dept: LAB | Facility: HOSPITAL | Age: 55
End: 2020-09-10

## 2020-09-10 DIAGNOSIS — F41.9 ANXIETY: ICD-10-CM

## 2020-09-10 DIAGNOSIS — I70.209 DIABETES TYPE II WITH ATHEROSCLEROSIS OF ARTERIES OF EXTREMITIES (HCC): ICD-10-CM

## 2020-09-10 DIAGNOSIS — I10 HYPERTENSION, UNSPECIFIED TYPE: ICD-10-CM

## 2020-09-10 DIAGNOSIS — I70.209 DIABETES TYPE II WITH ATHEROSCLEROSIS OF ARTERIES OF EXTREMITIES (HCC): Primary | ICD-10-CM

## 2020-09-10 DIAGNOSIS — E11.51 DIABETES TYPE II WITH ATHEROSCLEROSIS OF ARTERIES OF EXTREMITIES (HCC): Primary | ICD-10-CM

## 2020-09-10 DIAGNOSIS — E11.51 DIABETES TYPE II WITH ATHEROSCLEROSIS OF ARTERIES OF EXTREMITIES (HCC): ICD-10-CM

## 2020-09-10 PROCEDURE — 84443 ASSAY THYROID STIM HORMONE: CPT

## 2020-09-10 PROCEDURE — 83036 HEMOGLOBIN GLYCOSYLATED A1C: CPT

## 2020-09-10 PROCEDURE — 84436 ASSAY OF TOTAL THYROXINE: CPT

## 2020-09-10 PROCEDURE — 84480 ASSAY TRIIODOTHYRONINE (T3): CPT

## 2020-09-10 PROCEDURE — 81003 URINALYSIS AUTO W/O SCOPE: CPT

## 2020-09-10 PROCEDURE — 80053 COMPREHEN METABOLIC PANEL: CPT

## 2020-09-10 PROCEDURE — 36415 COLL VENOUS BLD VENIPUNCTURE: CPT

## 2020-09-10 PROCEDURE — 85025 COMPLETE CBC W/AUTO DIFF WBC: CPT

## 2020-09-10 PROCEDURE — 80061 LIPID PANEL: CPT

## 2020-09-10 PROCEDURE — 82043 UR ALBUMIN QUANTITATIVE: CPT

## 2020-09-10 PROCEDURE — 82570 ASSAY OF URINE CREATININE: CPT

## 2020-09-11 LAB
ALBUMIN SERPL-MCNC: 4 G/DL (ref 3.5–5.2)
ALBUMIN UR-MCNC: <1.2 MG/DL
ALBUMIN/GLOB SERPL: 1.3 G/DL
ALP SERPL-CCNC: 134 U/L (ref 39–117)
ALT SERPL W P-5'-P-CCNC: 23 U/L (ref 1–33)
ANION GAP SERPL CALCULATED.3IONS-SCNC: 7.4 MMOL/L (ref 5–15)
AST SERPL-CCNC: 19 U/L (ref 1–32)
BASOPHILS # BLD AUTO: 0.04 10*3/MM3 (ref 0–0.2)
BASOPHILS NFR BLD AUTO: 0.5 % (ref 0–1.5)
BILIRUB SERPL-MCNC: 0.3 MG/DL (ref 0–1.2)
BILIRUB UR QL STRIP: NEGATIVE
BUN SERPL-MCNC: 11 MG/DL (ref 6–20)
BUN/CREAT SERPL: 18.6 (ref 7–25)
CALCIUM SPEC-SCNC: 8.7 MG/DL (ref 8.6–10.5)
CHLORIDE SERPL-SCNC: 99 MMOL/L (ref 98–107)
CHOLEST SERPL-MCNC: 206 MG/DL (ref 0–200)
CLARITY UR: CLEAR
CO2 SERPL-SCNC: 27.6 MMOL/L (ref 22–29)
COLOR UR: YELLOW
CREAT SERPL-MCNC: 0.59 MG/DL (ref 0.57–1)
CREAT UR-MCNC: 110.8 MG/DL
DEPRECATED RDW RBC AUTO: 41.8 FL (ref 37–54)
EOSINOPHIL # BLD AUTO: 0.22 10*3/MM3 (ref 0–0.4)
EOSINOPHIL NFR BLD AUTO: 2.9 % (ref 0.3–6.2)
ERYTHROCYTE [DISTWIDTH] IN BLOOD BY AUTOMATED COUNT: 14.1 % (ref 12.3–15.4)
GFR SERPL CREATININE-BSD FRML MDRD: 105 ML/MIN/1.73
GLOBULIN UR ELPH-MCNC: 3 GM/DL
GLUCOSE SERPL-MCNC: 237 MG/DL (ref 65–99)
GLUCOSE UR STRIP-MCNC: NEGATIVE MG/DL
HBA1C MFR BLD: 8.19 % (ref 4.8–5.6)
HCT VFR BLD AUTO: 42.3 % (ref 34–46.6)
HDLC SERPL-MCNC: 44 MG/DL (ref 40–60)
HGB BLD-MCNC: 13.9 G/DL (ref 12–15.9)
HGB UR QL STRIP.AUTO: NEGATIVE
IMM GRANULOCYTES # BLD AUTO: 0.03 10*3/MM3 (ref 0–0.05)
IMM GRANULOCYTES NFR BLD AUTO: 0.4 % (ref 0–0.5)
KETONES UR QL STRIP: NEGATIVE
LDLC SERPL CALC-MCNC: 130 MG/DL (ref 0–100)
LDLC/HDLC SERPL: 2.95 {RATIO}
LEUKOCYTE ESTERASE UR QL STRIP.AUTO: NEGATIVE
LYMPHOCYTES # BLD AUTO: 1.8 10*3/MM3 (ref 0.7–3.1)
LYMPHOCYTES NFR BLD AUTO: 24 % (ref 19.6–45.3)
MCH RBC QN AUTO: 27 PG (ref 26.6–33)
MCHC RBC AUTO-ENTMCNC: 32.9 G/DL (ref 31.5–35.7)
MCV RBC AUTO: 82.1 FL (ref 79–97)
MICROALBUMIN/CREAT UR: NORMAL MG/G{CREAT}
MONOCYTES # BLD AUTO: 0.42 10*3/MM3 (ref 0.1–0.9)
MONOCYTES NFR BLD AUTO: 5.6 % (ref 5–12)
NEUTROPHILS NFR BLD AUTO: 5 10*3/MM3 (ref 1.7–7)
NEUTROPHILS NFR BLD AUTO: 66.6 % (ref 42.7–76)
NITRITE UR QL STRIP: NEGATIVE
NRBC BLD AUTO-RTO: 0 /100 WBC (ref 0–0.2)
PH UR STRIP.AUTO: 5.5 [PH] (ref 5–8)
PLATELET # BLD AUTO: 295 10*3/MM3 (ref 140–450)
PMV BLD AUTO: 9.6 FL (ref 6–12)
POTASSIUM SERPL-SCNC: 4.4 MMOL/L (ref 3.5–5.2)
PROT SERPL-MCNC: 7 G/DL (ref 6–8.5)
PROT UR QL STRIP: NEGATIVE
RBC # BLD AUTO: 5.15 10*6/MM3 (ref 3.77–5.28)
SODIUM SERPL-SCNC: 134 MMOL/L (ref 136–145)
SP GR UR STRIP: 1.02 (ref 1–1.03)
T3 SERPL-MCNC: 111 NG/DL (ref 80–200)
T4 SERPL-MCNC: 9.63 MCG/DL (ref 4.5–11.7)
TRIGL SERPL-MCNC: 161 MG/DL (ref 0–150)
TSH SERPL DL<=0.05 MIU/L-ACNC: 0.99 UIU/ML (ref 0.27–4.2)
UROBILINOGEN UR QL STRIP: NORMAL
VLDLC SERPL-MCNC: 32.2 MG/DL (ref 5–40)
WBC # BLD AUTO: 7.51 10*3/MM3 (ref 3.4–10.8)

## 2020-09-14 ENCOUNTER — LAB (OUTPATIENT)
Dept: LAB | Facility: HOSPITAL | Age: 55
End: 2020-09-14

## 2020-09-14 ENCOUNTER — TRANSCRIBE ORDERS (OUTPATIENT)
Dept: ADMINISTRATIVE | Facility: HOSPITAL | Age: 55
End: 2020-09-14

## 2020-09-14 DIAGNOSIS — Z01.818 PREOP EXAMINATION: Primary | ICD-10-CM

## 2020-09-14 DIAGNOSIS — Z01.818 PREOP EXAMINATION: ICD-10-CM

## 2020-09-14 PROCEDURE — C9803 HOPD COVID-19 SPEC COLLECT: HCPCS

## 2020-09-14 PROCEDURE — U0004 COV-19 TEST NON-CDC HGH THRU: HCPCS

## 2020-09-15 LAB — SARS-COV-2 RNA NOSE QL NAA+PROBE: NOT DETECTED

## 2020-09-17 ENCOUNTER — OUTSIDE FACILITY SERVICE (OUTPATIENT)
Dept: ORTHOPEDIC SURGERY | Facility: CLINIC | Age: 55
End: 2020-09-17

## 2020-09-17 DIAGNOSIS — Z98.890 S/P ROTATOR CUFF SURGERY: Primary | ICD-10-CM

## 2020-09-17 PROCEDURE — 29827 SHO ARTHRS SRG RT8TR CUF RPR: CPT | Performed by: ORTHOPAEDIC SURGERY

## 2020-09-17 RX ORDER — ONDANSETRON 4 MG/1
4 TABLET, FILM COATED ORAL EVERY 8 HOURS PRN
Qty: 10 TABLET | Refills: 1 | Status: SHIPPED | OUTPATIENT
Start: 2020-09-17 | End: 2020-10-14

## 2020-09-17 RX ORDER — OXYCODONE HYDROCHLORIDE AND ACETAMINOPHEN 5; 325 MG/1; MG/1
1 TABLET ORAL EVERY 6 HOURS PRN
Qty: 20 TABLET | Refills: 0 | Status: SHIPPED | OUTPATIENT
Start: 2020-09-17 | End: 2020-10-14

## 2020-09-18 ENCOUNTER — TELEPHONE (OUTPATIENT)
Dept: ORTHOPEDIC SURGERY | Facility: CLINIC | Age: 55
End: 2020-09-18

## 2020-09-18 NOTE — TELEPHONE ENCOUNTER
PATIENT'S  CALLED IN SAYING THAT PATIENT IS IN A LOT OF PAIN AND WOULD LIKE A CALL BACK. [phone]

## 2020-09-23 ENCOUNTER — OFFICE VISIT (OUTPATIENT)
Dept: ORTHOPEDIC SURGERY | Facility: CLINIC | Age: 55
End: 2020-09-23

## 2020-09-23 DIAGNOSIS — Z98.890 S/P ROTATOR CUFF SURGERY: Primary | ICD-10-CM

## 2020-09-23 PROCEDURE — 99024 POSTOP FOLLOW-UP VISIT: CPT | Performed by: ORTHOPAEDIC SURGERY

## 2020-09-23 RX ORDER — MELOXICAM 7.5 MG/1
7.5 TABLET ORAL DAILY
Status: DISCONTINUED | OUTPATIENT
Start: 2020-09-23 | End: 2020-09-25

## 2020-09-23 NOTE — PROGRESS NOTES
Chief Complaint   Patient presents with   • Post-op     1 week S/P Left Shoulder Arthroscopy with Rotator  Cuff Repair 09/17/20           HPI    She is complaining of pain.  Difficulty sleeping.  Pain is 7 out of 10.    There were no vitals filed for this visit.      Physical Exam:  Portals are good.  Distally motor sensory intact.        ICD-10-CM ICD-9-CM   1. S/P rotator cuff surgery  Z98.890 V45.89     She is follow-up left shoulder cuff repair from last week.  She will continue the sling.  I ordered Mobic for her.  She is off narcotics now.  She will follow-up in 3 weeks to start physical therapy.  She is retired.    Answers for HPI/ROS submitted by the patient on 9/21/2020   What is the primary reason for your visit?: Other  Please describe your symptoms.: Shoulder pain  Have you had these symptoms before?: Yes  How long have you been having these symptoms?: Greater than 2 weeks  Please list any medications you are currently taking for this condition.: Tylenol

## 2020-09-25 ENCOUNTER — TELEPHONE (OUTPATIENT)
Dept: ORTHOPEDIC SURGERY | Facility: CLINIC | Age: 55
End: 2020-09-25

## 2020-09-25 RX ORDER — MELOXICAM 7.5 MG/1
TABLET ORAL
Qty: 90 TABLET | Refills: 2 | Status: SHIPPED | OUTPATIENT
Start: 2020-09-25 | End: 2022-12-08

## 2020-09-25 NOTE — TELEPHONE ENCOUNTER
Ca you re-send to the patient's pharmacy? It doesn't look like it went through for some reason.  Catherine

## 2020-10-14 ENCOUNTER — OFFICE VISIT (OUTPATIENT)
Dept: ORTHOPEDIC SURGERY | Facility: CLINIC | Age: 55
End: 2020-10-14

## 2020-10-14 DIAGNOSIS — M25.512 LEFT SHOULDER PAIN, UNSPECIFIED CHRONICITY: ICD-10-CM

## 2020-10-14 DIAGNOSIS — Z98.890 S/P ROTATOR CUFF SURGERY: Primary | ICD-10-CM

## 2020-10-14 PROCEDURE — 99024 POSTOP FOLLOW-UP VISIT: CPT | Performed by: ORTHOPAEDIC SURGERY

## 2020-10-14 RX ORDER — IBUPROFEN 200 MG
200 TABLET ORAL EVERY 6 HOURS PRN
COMMUNITY

## 2020-10-14 NOTE — PROGRESS NOTES
Chief Complaint   Patient presents with   • Post-op     3 week follow up; 3.5 weeks s/p Left Shoulder Arthroscopy with Rotator  Cuff Repair 09/17/20           HPI    She is doing well.  She had one incident where she did not have her sling and she reached to get her following father.  She felt a pop.  She has had some pain.  She has been doing some elbow exercises.    There were no vitals filed for this visit.      Physical Exam:  Portals look good.  She is neurovascular tact.  Limited shoulder motion at this point.    ICD-10-CM ICD-9-CM   1. S/P rotator cuff surgery  Z98.890 V45.89   2. Left shoulder pain, unspecified chronicity  M25.512 719.41       Orders Placed This Encounter   Procedures   • Ambulatory Referral to Physical Therapy     She will start physical therapy.  She is retired.  She would do the physical therapy close to home.  She will follow up in a month.  It does not sound like she reinjured her shoulder significantly but that is something to keep an eye on.

## 2020-12-02 ENCOUNTER — TELEPHONE (OUTPATIENT)
Dept: ENDOCRINOLOGY | Facility: CLINIC | Age: 55
End: 2020-12-02

## 2020-12-02 NOTE — TELEPHONE ENCOUNTER
PATIENT IS CALLING TO CHECK STATUS OF HER INSULIN BEING DELIVERED TO OUR OFFICE. SHE STATES [facility] SENDS HER INSULIN TO OUR OFFICE AND SHE COMES AND PICKS THEM UP. SHE WOULD LIKE A CALL BACK [phone]

## 2021-01-12 NOTE — PROGRESS NOTES
Patient: Ivette Khalil    YOB: 1965    Date: 01/13/2021    Primary Care Provider: Cady Sanchez APRN    Chief Complaint   Patient presents with   • Colonoscopy       SUBJECTIVE:    History of present illness:  Patient has a history of angina and a history of diverticulitis.  Patient's last screening colonoscopy was in 2015. I saw the patient in the office today as a consultation for a colonoscopy.  She has had a partial colectomy.  She complains of BRBPR, rectal pain and pressure, and constipation.    She does have a history significant for many years ago episodes of chronic diverticulitis that did require sigmoid colectomy performed at the [facility] for personal reasons.  She did have postoperative problems including a wound infection that required a 5-day stay at the [facility].    The following portions of the patient's history were reviewed and updated as appropriate: allergies, current medications, past family history, past medical history, past social history, past surgical history and problem list.    Review of Systems   Constitutional: Negative for chills, fever and unexpected weight change.   HENT: Negative for hearing loss, trouble swallowing and voice change.    Eyes: Negative for visual disturbance.   Respiratory: Negative for apnea, cough, chest tightness, shortness of breath and wheezing.    Cardiovascular: Negative for chest pain, palpitations and leg swelling.   Gastrointestinal: Positive for anal bleeding, constipation and rectal pain. Negative for abdominal distention, abdominal pain, blood in stool, diarrhea, nausea and vomiting.   Endocrine: Negative for cold intolerance and heat intolerance.   Genitourinary: Negative for difficulty urinating, dysuria and flank pain.   Musculoskeletal: Negative for back pain and gait problem.   Skin: Negative for color change, rash and wound.   Neurological: Negative for dizziness, syncope, speech difficulty, weakness,  light-headedness, numbness and headaches.   Hematological: Negative for adenopathy. Does not bruise/bleed easily.   Psychiatric/Behavioral: Negative for confusion. The patient is not nervous/anxious.        History:  Past Medical History:   Diagnosis Date   • Arthritis    • Back pain    • Bronchitis    • Diabetes mellitus (CMS/HCC)    • Disease of thyroid gland    • Elevated cholesterol    • GERD (gastroesophageal reflux disease)    • Heart attack (CMS/HCC)    • History of diverticulosis    • History of ear infections    • Hypertension    • Migraine    • Obesity    • Sleep apnea     undiagnosed       Past Surgical History:   Procedure Laterality Date   • APPENDECTOMY      open   • BILATERAL BREAST REDUCTION     • BLADDER SURGERY      mesh removed 2nd to complications   • CARDIAC CATHETERIZATION N/A 2016    Procedure: Left Heart Cath- Right radial approach. ;  Surgeon: Shalom Giron MD;  Location:  [facility];  Service:    •  SECTION     • CHOLECYSTECTOMY      laproscopic   • COLON RESECTION  2014    sigmoidectomy with multiple post operations and packing   • COLONOSCOPY     • HYSTERECTOMY      removal of both ovaries   • ORIF HUMERUS FRACTURE     • ORIF HUMERUS FRACTURE     • REDUCTION MAMMAPLASTY     • SHOULDER SURGERY Left    • THYROIDECTOMY N/A 10/24/2019    Procedure: THYROIDECTOMY TOTAL;  Surgeon: Layo Perera MD;  Location:  [facility];  Service: General       Family History   Problem Relation Age of Onset   • Diabetes Mother    • Heart disease Mother    • Hypertension Mother    • Other Father         black lung   • Diabetes Father    • Hypertension Father    • Heart disease Father    • COPD Father    • Diabetes Sister    • Heart disease Brother    • Heart attack Brother         Massive MI at 51       Social History     Tobacco Use   • Smoking status: Never Smoker   • Smokeless tobacco: Never Used   Substance Use Topics   • Alcohol use: No   • Drug use: No  "      Allergies:  Allergies   Allergen Reactions   • Codeine Hives   • Penicillins Hives     Does ok with Keflex   • Prednisone Hives     Also, Swelling and skin redness       Medications:    Current Outpatient Medications:   •  hydrOXYzine (ATARAX) 10 MG tablet, , Disp: , Rfl:   •  ibuprofen (ADVIL,MOTRIN) 200 MG tablet, Take 200 mg by mouth Every 6 (Six) Hours As Needed for Mild Pain ., Disp: , Rfl:   •  Insulin Aspart (NOVOLOG SC), Inject 12 Units under the skin into the appropriate area as directed Daily., Disp: , Rfl:   •  Insulin Degludec (TRESIBA SC), Inject 20 Units under the skin into the appropriate area as directed 2 (Two) Times a Day., Disp: , Rfl:   •  insulin detemir (LEVEMIR) 100 UNIT/ML injection, Inject 22 Units under the skin into the appropriate area as directed 2 (Two) Times a Day., Disp: , Rfl:   •  levothyroxine (SYNTHROID, LEVOTHROID) 200 MCG tablet, , Disp: , Rfl:   •  losartan (COZAAR) 25 MG tablet, , Disp: , Rfl:   •  meloxicam (MOBIC) 7.5 MG tablet, 1 Oral Daily with food., Disp: 90 tablet, Rfl: 2  •  metoprolol tartrate (LOPRESSOR) 50 MG tablet, Take 50 mg by mouth Daily., Disp: , Rfl:   •  ondansetron (ZOFRAN) 4 MG tablet, , Disp: , Rfl:   •  pantoprazole (PROTONIX) 40 MG EC tablet, Take 40 mg by mouth Daily., Disp: , Rfl:   •  PARoxetine (PAXIL) 20 MG tablet, Take 40 mg by mouth Every Morning., Disp: , Rfl:   •  Semaglutide,0.25 or 0.5MG/DOS, (Ozempic, 0.25 or 0.5 MG/DOSE,) 2 MG/1.5ML solution pen-injector, Inject 1.5 mg under the skin into the appropriate area as directed Daily., Disp: , Rfl:   •  True Metrix Blood Glucose Test test strip, , Disp: , Rfl:     OBJECTIVE:    Vital Signs:   Vitals:    01/13/21 1346   BP: 128/80   Pulse: 86   Temp: 98.8 °F (37.1 °C)   TempSrc: Temporal   SpO2: 95%   Weight: 128 kg (283 lb)   Height: 167.6 cm (66\")       Physical Exam:   General Appearance:    Alert, cooperative, in no acute distress   Head:    Normocephalic, without obvious abnormality, " atraumatic   Eyes:            Lids and lashes normal, conjunctivae and sclerae normal, no   icterus, no pallor, corneas clear, PERRL   Ears:    Ears appear intact with no abnormalities noted   Throat:   No oral lesions, no thrush, oral mucosa moist   Neck:   No adenopathy, supple, trachea midline, no thyromegaly,  no JVD   Lungs:     Clear to auscultation,respirations regular, even and                  unlabored    Heart:    Regular rhythm and normal rate, normal S1 and S2, no            murmur   Abdomen:     no masses, no organomegaly, soft non-tender, non-distended, no guarding, there is no evidence of tenderness, no evidence of hernia   Extremities:   Moves all extremities well, no edema, no cyanosis, no             redness   Pulses:   Pulses palpable and equal bilaterally   Skin:   No bleeding, bruising or rash   Lymph nodes:   No palpable adenopathy   Neurologic:   Cranial nerves 2 - 12 grossly intact, sensation intact      Results Review:   I reviewed the patient's new clinical results.  I reviewed the patient's new imaging results and agree with the interpretation.  I reviewed the patient's other test results and agree with the interpretation    Review of Systems was reviewed and confirmed as accurate as documented by the MA.    ASSESSMENT/PLAN:    1. Rectal bleed        I recommend a colonoscopy for further evaluation. The procedure was explained as well as the risks which include but are not limited to bleeding, infection, perforation, abdominal pain etc. The patient understands these risks and the procedure and wishes to proceed.     Electronically signed by Brien Turner MD  01/13/21 09:04 EST    Portions of this note have been scribed for Brien Turner MD by Michelle Milligan. 1/13/2021  14:08 EST

## 2021-01-12 NOTE — H&P (VIEW-ONLY)
Patient: Ivette Khalil    YOB: 1965    Date: 01/13/2021    Primary Care Provider: Cady Sanchez APRN    Chief Complaint   Patient presents with   • Colonoscopy       SUBJECTIVE:    History of present illness:  Patient has a history of angina and a history of diverticulitis.  Patient's last screening colonoscopy was in 2015. I saw the patient in the office today as a consultation for a colonoscopy.  She has had a partial colectomy.  She complains of BRBPR, rectal pain and pressure, and constipation.    She does have a history significant for many years ago episodes of chronic diverticulitis that did require sigmoid colectomy performed at the [facility] for personal reasons.  She did have postoperative problems including a wound infection that required a 5-day stay at the [facility].    The following portions of the patient's history were reviewed and updated as appropriate: allergies, current medications, past family history, past medical history, past social history, past surgical history and problem list.    Review of Systems   Constitutional: Negative for chills, fever and unexpected weight change.   HENT: Negative for hearing loss, trouble swallowing and voice change.    Eyes: Negative for visual disturbance.   Respiratory: Negative for apnea, cough, chest tightness, shortness of breath and wheezing.    Cardiovascular: Negative for chest pain, palpitations and leg swelling.   Gastrointestinal: Positive for anal bleeding, constipation and rectal pain. Negative for abdominal distention, abdominal pain, blood in stool, diarrhea, nausea and vomiting.   Endocrine: Negative for cold intolerance and heat intolerance.   Genitourinary: Negative for difficulty urinating, dysuria and flank pain.   Musculoskeletal: Negative for back pain and gait problem.   Skin: Negative for color change, rash and wound.   Neurological: Negative for dizziness, syncope, speech difficulty, weakness,  light-headedness, numbness and headaches.   Hematological: Negative for adenopathy. Does not bruise/bleed easily.   Psychiatric/Behavioral: Negative for confusion. The patient is not nervous/anxious.        History:  Past Medical History:   Diagnosis Date   • Arthritis    • Back pain    • Bronchitis    • Diabetes mellitus (CMS/HCC)    • Disease of thyroid gland    • Elevated cholesterol    • GERD (gastroesophageal reflux disease)    • Heart attack (CMS/HCC)    • History of diverticulosis    • History of ear infections    • Hypertension    • Migraine    • Obesity    • Sleep apnea     undiagnosed       Past Surgical History:   Procedure Laterality Date   • APPENDECTOMY      open   • BILATERAL BREAST REDUCTION     • BLADDER SURGERY      mesh removed 2nd to complications   • CARDIAC CATHETERIZATION N/A 2016    Procedure: Left Heart Cath- Right radial approach. ;  Surgeon: Shalom Giron MD;  Location:  [facility];  Service:    •  SECTION     • CHOLECYSTECTOMY      laproscopic   • COLON RESECTION  2014    sigmoidectomy with multiple post operations and packing   • COLONOSCOPY     • HYSTERECTOMY      removal of both ovaries   • ORIF HUMERUS FRACTURE     • ORIF HUMERUS FRACTURE     • REDUCTION MAMMAPLASTY     • SHOULDER SURGERY Left    • THYROIDECTOMY N/A 10/24/2019    Procedure: THYROIDECTOMY TOTAL;  Surgeon: Layo Perera MD;  Location:  [facility];  Service: General       Family History   Problem Relation Age of Onset   • Diabetes Mother    • Heart disease Mother    • Hypertension Mother    • Other Father         black lung   • Diabetes Father    • Hypertension Father    • Heart disease Father    • COPD Father    • Diabetes Sister    • Heart disease Brother    • Heart attack Brother         Massive MI at 51       Social History     Tobacco Use   • Smoking status: Never Smoker   • Smokeless tobacco: Never Used   Substance Use Topics   • Alcohol use: No   • Drug use: No  "      Allergies:  Allergies   Allergen Reactions   • Codeine Hives   • Penicillins Hives     Does ok with Keflex   • Prednisone Hives     Also, Swelling and skin redness       Medications:    Current Outpatient Medications:   •  hydrOXYzine (ATARAX) 10 MG tablet, , Disp: , Rfl:   •  ibuprofen (ADVIL,MOTRIN) 200 MG tablet, Take 200 mg by mouth Every 6 (Six) Hours As Needed for Mild Pain ., Disp: , Rfl:   •  Insulin Aspart (NOVOLOG SC), Inject 12 Units under the skin into the appropriate area as directed Daily., Disp: , Rfl:   •  Insulin Degludec (TRESIBA SC), Inject 20 Units under the skin into the appropriate area as directed 2 (Two) Times a Day., Disp: , Rfl:   •  insulin detemir (LEVEMIR) 100 UNIT/ML injection, Inject 22 Units under the skin into the appropriate area as directed 2 (Two) Times a Day., Disp: , Rfl:   •  levothyroxine (SYNTHROID, LEVOTHROID) 200 MCG tablet, , Disp: , Rfl:   •  losartan (COZAAR) 25 MG tablet, , Disp: , Rfl:   •  meloxicam (MOBIC) 7.5 MG tablet, 1 Oral Daily with food., Disp: 90 tablet, Rfl: 2  •  metoprolol tartrate (LOPRESSOR) 50 MG tablet, Take 50 mg by mouth Daily., Disp: , Rfl:   •  ondansetron (ZOFRAN) 4 MG tablet, , Disp: , Rfl:   •  pantoprazole (PROTONIX) 40 MG EC tablet, Take 40 mg by mouth Daily., Disp: , Rfl:   •  PARoxetine (PAXIL) 20 MG tablet, Take 40 mg by mouth Every Morning., Disp: , Rfl:   •  Semaglutide,0.25 or 0.5MG/DOS, (Ozempic, 0.25 or 0.5 MG/DOSE,) 2 MG/1.5ML solution pen-injector, Inject 1.5 mg under the skin into the appropriate area as directed Daily., Disp: , Rfl:   •  True Metrix Blood Glucose Test test strip, , Disp: , Rfl:     OBJECTIVE:    Vital Signs:   Vitals:    01/13/21 1346   BP: 128/80   Pulse: 86   Temp: 98.8 °F (37.1 °C)   TempSrc: Temporal   SpO2: 95%   Weight: 128 kg (283 lb)   Height: 167.6 cm (66\")       Physical Exam:   General Appearance:    Alert, cooperative, in no acute distress   Head:    Normocephalic, without obvious abnormality, " atraumatic   Eyes:            Lids and lashes normal, conjunctivae and sclerae normal, no   icterus, no pallor, corneas clear, PERRL   Ears:    Ears appear intact with no abnormalities noted   Throat:   No oral lesions, no thrush, oral mucosa moist   Neck:   No adenopathy, supple, trachea midline, no thyromegaly,  no JVD   Lungs:     Clear to auscultation,respirations regular, even and                  unlabored    Heart:    Regular rhythm and normal rate, normal S1 and S2, no            murmur   Abdomen:     no masses, no organomegaly, soft non-tender, non-distended, no guarding, there is no evidence of tenderness, no evidence of hernia   Extremities:   Moves all extremities well, no edema, no cyanosis, no             redness   Pulses:   Pulses palpable and equal bilaterally   Skin:   No bleeding, bruising or rash   Lymph nodes:   No palpable adenopathy   Neurologic:   Cranial nerves 2 - 12 grossly intact, sensation intact      Results Review:   I reviewed the patient's new clinical results.  I reviewed the patient's new imaging results and agree with the interpretation.  I reviewed the patient's other test results and agree with the interpretation    Review of Systems was reviewed and confirmed as accurate as documented by the MA.    ASSESSMENT/PLAN:    1. Rectal bleed        I recommend a colonoscopy for further evaluation. The procedure was explained as well as the risks which include but are not limited to bleeding, infection, perforation, abdominal pain etc. The patient understands these risks and the procedure and wishes to proceed.     Electronically signed by Brien Turner MD  01/13/21 09:04 EST    Portions of this note have been scribed for Brien Turner MD by Michelle Milligan. 1/13/2021  14:08 EST

## 2021-01-13 ENCOUNTER — OFFICE VISIT (OUTPATIENT)
Dept: SURGERY | Facility: CLINIC | Age: 56
End: 2021-01-13

## 2021-01-13 VITALS
TEMPERATURE: 98.8 F | DIASTOLIC BLOOD PRESSURE: 80 MMHG | HEART RATE: 86 BPM | WEIGHT: 283 LBS | SYSTOLIC BLOOD PRESSURE: 128 MMHG | HEIGHT: 66 IN | OXYGEN SATURATION: 95 % | BODY MASS INDEX: 45.48 KG/M2

## 2021-01-13 DIAGNOSIS — K62.5 RECTAL BLEED: Primary | ICD-10-CM

## 2021-01-13 PROCEDURE — 99204 OFFICE O/P NEW MOD 45 MIN: CPT | Performed by: SURGERY

## 2021-01-13 RX ORDER — LEVOTHYROXINE SODIUM 0.2 MG/1
200 TABLET ORAL DAILY
COMMUNITY
Start: 2020-12-23 | End: 2021-12-06 | Stop reason: SDUPTHER

## 2021-01-13 RX ORDER — ONDANSETRON 4 MG/1
TABLET, FILM COATED ORAL
Status: ON HOLD | COMMUNITY
Start: 2020-11-17 | End: 2021-01-18

## 2021-01-13 RX ORDER — CALCIUM CITRATE/VITAMIN D3 200MG-6.25
TABLET ORAL
COMMUNITY
Start: 2020-11-25

## 2021-01-13 RX ORDER — LOSARTAN POTASSIUM 25 MG/1
25 TABLET ORAL DAILY
COMMUNITY
Start: 2020-12-23 | End: 2021-04-02 | Stop reason: SDUPTHER

## 2021-01-13 RX ORDER — HYDROXYZINE HYDROCHLORIDE 10 MG/1
10 TABLET, FILM COATED ORAL
COMMUNITY
Start: 2021-01-02 | End: 2021-09-15

## 2021-01-14 PROBLEM — K62.5 RECTAL BLEED: Status: ACTIVE | Noted: 2021-01-14

## 2021-01-15 ENCOUNTER — LAB (OUTPATIENT)
Dept: LAB | Facility: HOSPITAL | Age: 56
End: 2021-01-15

## 2021-01-15 DIAGNOSIS — Z01.818 PRE-OP TESTING: Primary | ICD-10-CM

## 2021-01-15 DIAGNOSIS — Z01.818 PRE-OP TESTING: ICD-10-CM

## 2021-01-15 PROCEDURE — U0004 COV-19 TEST NON-CDC HGH THRU: HCPCS | Performed by: SURGERY

## 2021-01-17 LAB — SARS-COV-2 RNA RESP QL NAA+PROBE: NOT DETECTED

## 2021-01-18 ENCOUNTER — ANESTHESIA EVENT (OUTPATIENT)
Dept: GASTROENTEROLOGY | Facility: HOSPITAL | Age: 56
End: 2021-01-18

## 2021-01-18 ENCOUNTER — ANESTHESIA (OUTPATIENT)
Dept: GASTROENTEROLOGY | Facility: HOSPITAL | Age: 56
End: 2021-01-18

## 2021-01-18 ENCOUNTER — HOSPITAL ENCOUNTER (OUTPATIENT)
Facility: HOSPITAL | Age: 56
Setting detail: HOSPITAL OUTPATIENT SURGERY
Discharge: HOME | End: 2021-01-18
Attending: SURGERY | Admitting: SURGERY

## 2021-01-18 VITALS
RESPIRATION RATE: 18 BRPM | BODY MASS INDEX: 45.48 KG/M2 | OXYGEN SATURATION: 96 % | HEIGHT: 66 IN | DIASTOLIC BLOOD PRESSURE: 52 MMHG | HEART RATE: 80 BPM | TEMPERATURE: 98 F | SYSTOLIC BLOOD PRESSURE: 106 MMHG | WEIGHT: 283 LBS

## 2021-01-18 LAB — GLUCOSE BLDC GLUCOMTR-MCNC: 147 MG/DL (ref 70–130)

## 2021-01-18 PROCEDURE — 82962 GLUCOSE BLOOD TEST: CPT

## 2021-01-18 PROCEDURE — 25010000002 PROPOFOL 200 MG/20ML EMULSION: Performed by: NURSE ANESTHETIST, CERTIFIED REGISTERED

## 2021-01-18 PROCEDURE — 45378 DIAGNOSTIC COLONOSCOPY: CPT | Performed by: SURGERY

## 2021-01-18 RX ORDER — SODIUM CHLORIDE, SODIUM LACTATE, POTASSIUM CHLORIDE, CALCIUM CHLORIDE 600; 310; 30; 20 MG/100ML; MG/100ML; MG/100ML; MG/100ML
1000 INJECTION, SOLUTION INTRAVENOUS CONTINUOUS
Status: DISCONTINUED | OUTPATIENT
Start: 2021-01-18 | End: 2021-01-18 | Stop reason: HOSPADM

## 2021-01-18 RX ORDER — LIDOCAINE HYDROCHLORIDE 20 MG/ML
INJECTION, SOLUTION INTRAVENOUS AS NEEDED
Status: DISCONTINUED | OUTPATIENT
Start: 2021-01-18 | End: 2021-01-18 | Stop reason: SURG

## 2021-01-18 RX ORDER — SODIUM CHLORIDE 0.9 % (FLUSH) 0.9 %
10 SYRINGE (ML) INJECTION AS NEEDED
Status: DISCONTINUED | OUTPATIENT
Start: 2021-01-18 | End: 2021-01-18 | Stop reason: HOSPADM

## 2021-01-18 RX ORDER — OMEPRAZOLE 40 MG/1
40 CAPSULE, DELAYED RELEASE ORAL DAILY
COMMUNITY

## 2021-01-18 RX ORDER — PROPOFOL 10 MG/ML
INJECTION, EMULSION INTRAVENOUS AS NEEDED
Status: DISCONTINUED | OUTPATIENT
Start: 2021-01-18 | End: 2021-01-18 | Stop reason: SURG

## 2021-01-18 RX ADMIN — SODIUM CHLORIDE, POTASSIUM CHLORIDE, SODIUM LACTATE AND CALCIUM CHLORIDE 1000 ML: 600; 310; 30; 20 INJECTION, SOLUTION INTRAVENOUS at 11:56

## 2021-01-18 RX ADMIN — LIDOCAINE HYDROCHLORIDE 60 MG: 20 INJECTION, SOLUTION INTRAVENOUS at 12:38

## 2021-01-18 RX ADMIN — PROPOFOL 50 MG: 10 INJECTION, EMULSION INTRAVENOUS at 12:50

## 2021-01-18 RX ADMIN — PROPOFOL 50 MG: 10 INJECTION, EMULSION INTRAVENOUS at 12:44

## 2021-01-18 RX ADMIN — PROPOFOL 50 MG: 10 INJECTION, EMULSION INTRAVENOUS at 12:53

## 2021-01-18 RX ADMIN — PROPOFOL 50 MG: 10 INJECTION, EMULSION INTRAVENOUS at 12:47

## 2021-01-18 RX ADMIN — PROPOFOL 50 MG: 10 INJECTION, EMULSION INTRAVENOUS at 12:41

## 2021-01-18 RX ADMIN — PROPOFOL 50 MG: 10 INJECTION, EMULSION INTRAVENOUS at 12:38

## 2021-01-18 NOTE — ANESTHESIA PREPROCEDURE EVALUATION
Anesthesia Evaluation     Patient summary reviewed and Nursing notes reviewed   history of anesthetic complications: PONV prolonged sedation  NPO Solid Status: > 8 hours  NPO Liquid Status: > 8 hours           Airway   Mallampati: III  TM distance: >3 FB  Neck ROM: limited  Possible difficult intubation and Large neck circumference  Dental - normal exam     Pulmonary - normal exam   (+) sleep apnea,   (-) not a smoker  Cardiovascular - normal exam    ECG reviewed  Patient on routine beta blocker and Beta blocker given within 24 hours of surgery    (+) hypertension 2 medications or greater, past MI , hyperlipidemia,     ROS comment: EKG NSR  with SA IMI  Anterior infarct     ECHO EF = 60%. Wall contracts normally.  LVDD (grade I) consistent with impaired relaxation.    CATH No obstructive CAD. Arteries smooth in caliber s atherosclerosis, EF 71% no wall motion abnormalities    Neuro/Psych  (+) headaches, psychiatric history Anxiety,     GI/Hepatic/Renal/Endo    (+) morbid obesity, hiatal hernia, GERD, GI bleeding lower , diabetes mellitus type 2 using insulin, thyroid problem thyroid nodules  (-)  obesity    Musculoskeletal     (+) back pain,   Abdominal  - normal exam   Substance History - negative use  (-) alcohol use, drug use     OB/GYN negative ob/gyn ROS   (-)  Pregnant    Comment: Hysterectomy      Other   arthritis,      ROS/Med Hx Other:  BMI: 45.7    HTN (hypertension)  Type 2 diabetes mellitus (CMS/[facility])  Hyperlipidemia  Diabetes education, encounter for  Multiple thyroid nodules  Rectal bleed    S/P thyroidectomy                        Anesthesia Plan    ASA 3     MAC   (Patient advised that intravenous sedation would be utilized as primary anesthetic technique. Every effort will be made to make sure patient is comfortable. Patient advised that they may experience recall of events of the procedure. Patient verbalized understanding and agreed to plan.     Zofran to be given intraop. )  intravenous  induction     Anesthetic plan, all risks, benefits, and alternatives have been provided, discussed and informed consent has been obtained with: patient.    Plan discussed with CRNA.

## 2021-01-18 NOTE — ANESTHESIA POSTPROCEDURE EVALUATION
Patient: Ivette Khalil    Procedure Summary     Date: 01/18/21 Room / Location: [facility] ENDOSCOPY 2 / [facility] ENDOSCOPY    Anesthesia Start: 1235 Anesthesia Stop: 1256    Procedure: COLONOSCOPY (N/A ) Diagnosis:       Rectal bleed      (Rectal bleed [K62.5])    Surgeon: Brien Turner MD Provider: Ross Alberts CRNA    Anesthesia Type: MAC ASA Status: 3          Anesthesia Type: MAC    Vitals  Vitals Value Taken Time   BP 99/45 01/18/21 1305   Temp 98 °F (36.7 °C) 01/18/21 1305   Pulse 94 01/18/21 1305   Resp 18 01/18/21 1305   SpO2 95 % 01/18/21 1305           Post Anesthesia Care and Evaluation    Patient location during evaluation: PHASE II  Patient participation: complete - patient participated  Level of consciousness: awake and sleepy but conscious  Pain management: adequate  Airway patency: patent  Anesthetic complications: No anesthetic complications  PONV Status: none  Cardiovascular status: acceptable and hemodynamically stable  Respiratory status: acceptable, room air, nonlabored ventilation and spontaneous ventilation  Hydration status: acceptable

## 2021-01-18 NOTE — DISCHARGE INSTRUCTIONS
Please follow all post op instructions and follow up appointment time from your physician's office included in your discharge packet.    REST TODAY    No pushing, pulling, tugging,  heavy lifting, or strenuous activity.  No major decision making, driving, or drinking alcoholic beverages for 24 hours. ( due to the medications you have  received)  Always use good hand hygiene/washing techniques.  NO driving while taking pain medications.    To assist you in voiding:  Drink plenty of fluids  Listen to running water while attempting to void.    If you are unable to urinate and you have an uncomfortable urge to void or it has been   6 hours since you were discharged, return to the Emergency Room

## 2021-01-20 RX ORDER — POLYETHYLENE GLYCOL 3350 17 G/17G
POWDER, FOR SOLUTION ORAL
Qty: 238 G | Refills: 0 | Status: SHIPPED | OUTPATIENT
Start: 2021-01-20 | End: 2021-04-02

## 2021-01-20 RX ORDER — BISACODYL 5 MG
TABLET, DELAYED RELEASE (ENTERIC COATED) ORAL
Qty: 4 TABLET | Refills: 0 | Status: SHIPPED | OUTPATIENT
Start: 2021-01-20 | End: 2021-04-02

## 2021-02-03 ENCOUNTER — TELEPHONE (OUTPATIENT)
Dept: ENDOCRINOLOGY | Facility: CLINIC | Age: 56
End: 2021-02-03

## 2021-02-03 NOTE — TELEPHONE ENCOUNTER
PATIENT IS CHECK STATUS OF DELIVERY OF HER NOVOLOG, TRESHIBA AND OZEMPIC MEDICATIONS WHICH ARE SUPPOSE TO BE DELIVERED HERE THROUGH [facility]. PATIENTS NUMBER [id number].

## 2021-02-08 ENCOUNTER — LAB (OUTPATIENT)
Dept: LAB | Facility: HOSPITAL | Age: 56
End: 2021-02-08

## 2021-02-08 ENCOUNTER — OFFICE VISIT (OUTPATIENT)
Dept: ENDOCRINOLOGY | Facility: CLINIC | Age: 56
End: 2021-02-08

## 2021-02-08 VITALS
DIASTOLIC BLOOD PRESSURE: 80 MMHG | SYSTOLIC BLOOD PRESSURE: 140 MMHG | BODY MASS INDEX: 45.74 KG/M2 | WEIGHT: 284.6 LBS | HEIGHT: 66 IN | TEMPERATURE: 96.8 F

## 2021-02-08 DIAGNOSIS — E11.65 UNCONTROLLED TYPE 2 DIABETES MELLITUS WITH HYPERGLYCEMIA (HCC): Primary | ICD-10-CM

## 2021-02-08 DIAGNOSIS — E89.0 POSTOPERATIVE HYPOTHYROIDISM: ICD-10-CM

## 2021-02-08 DIAGNOSIS — I25.10 ATHEROSCLEROSIS OF NATIVE CORONARY ARTERY OF NATIVE HEART WITHOUT ANGINA PECTORIS: ICD-10-CM

## 2021-02-08 DIAGNOSIS — Z79.4 TYPE 2 DIABETES MELLITUS WITHOUT COMPLICATION, WITH LONG-TERM CURRENT USE OF INSULIN (HCC): ICD-10-CM

## 2021-02-08 DIAGNOSIS — E11.9 TYPE 2 DIABETES MELLITUS WITHOUT COMPLICATION, WITH LONG-TERM CURRENT USE OF INSULIN (HCC): ICD-10-CM

## 2021-02-08 DIAGNOSIS — E04.2 MULTINODULAR GOITER: ICD-10-CM

## 2021-02-08 DIAGNOSIS — I10 ESSENTIAL HYPERTENSION: Chronic | ICD-10-CM

## 2021-02-08 PROBLEM — E03.9 ACQUIRED HYPOTHYROIDISM: Status: ACTIVE | Noted: 2021-02-08

## 2021-02-08 LAB
EXPIRATION DATE: NORMAL
HBA1C MFR BLD: 10 %
Lab: NORMAL
TSH SERPL DL<=0.05 MIU/L-ACNC: 3.1 UIU/ML (ref 0.27–4.2)

## 2021-02-08 PROCEDURE — 99214 OFFICE O/P EST MOD 30 MIN: CPT | Performed by: INTERNAL MEDICINE

## 2021-02-08 PROCEDURE — 84443 ASSAY THYROID STIM HORMONE: CPT

## 2021-02-08 PROCEDURE — 83036 HEMOGLOBIN GLYCOSYLATED A1C: CPT | Performed by: INTERNAL MEDICINE

## 2021-02-08 RX ORDER — PEN NEEDLE, DIABETIC 31 GX5/16"
NEEDLE, DISPOSABLE MISCELLANEOUS
COMMUNITY
Start: 2021-01-29 | End: 2021-05-19 | Stop reason: SDUPTHER

## 2021-02-08 RX ORDER — SEMAGLUTIDE 1.34 MG/ML
1 INJECTION, SOLUTION SUBCUTANEOUS WEEKLY
Qty: 9 ML | Refills: 3 | Status: SHIPPED | OUTPATIENT
Start: 2021-02-08 | End: 2021-04-02 | Stop reason: SDUPTHER

## 2021-02-08 NOTE — ASSESSMENT & PLAN NOTE
Hypertension is unchanged.  Continue current treatment regimen.  Blood pressure will be reassessed in 3 months.    SBP a bit elevated.  She hasn't been sleeping.  She is working with her PCP on this.

## 2021-02-08 NOTE — PROGRESS NOTES
"     Office Note      Date: 2021  Patient Name: Ivette Khalil  MRN: [id number]  : 1965    Chief Complaint   Patient presents with   • Follow-up   • Diabetes       History of Present Illness:   Ivette Khalil is a 55 y.o. female who presents for Diabetes type 2. Diagnosed in: . Treated in past with oral agents. Current treatments: ozempic and basal bolus insulin. Number of insulin shots per day: 4. Checks blood sugar none times a day. Has low blood sugar: no. Aspirin use: No -  . Statin use: No - intolerant. ACE-I/ARB use: Yes. Changes in health since last visit: none. Last eye exam 2019.    She had thyroidectomy in 2019 for benign goiter.  She is on T4 200mcg qd.  She is taking this correctly.  She isn't taking any interfering meds concurrently.  She hasn't noted any change in the size of her neck.  She denies any compressive sxs.  She denies any sxs of hypo- or hyperthyroidism at this time.      Subjective      Diabetic Complications:  Eyes: No  Kidneys: No  Feet: burning in feet  Heart: Yes -      Diet and Exercise:  Meals per day: 3  Minutes of exercise per week: 0 mins.    Review of Systems:   Review of Systems   Constitutional: Negative.    Cardiovascular: Negative.    Gastrointestinal: Negative.    Endocrine: Negative.        The following portions of the patient's history were reviewed and updated as appropriate: allergies, current medications, past family history, past medical history, past social history, past surgical history and problem list.    Objective       Visit Vitals  /80   Temp 96.8 °F (36 °C) (Infrared)   Ht 167.6 cm (66\")   Wt 129 kg (284 lb 9.6 oz)   BMI 45.94 kg/m²       Physical Exam:  Physical Exam  Constitutional:       Appearance: Normal appearance.   Neurological:      Mental Status: She is alert.         Labs:    HbA1c  Lab Results   Component Value Date    HGBA1C 10.0 2021       CMP  Lab Results   Component Value Date    GLUCOSE 237 (H) 09/10/2020 "    BUN 11 09/10/2020    CREATININE 0.59 09/10/2020    EGFRIFNONA 105 09/10/2020    BCR 18.6 09/10/2020    K 4.4 09/10/2020    CO2 27.6 09/10/2020    CALCIUM 8.7 09/10/2020    LABIL2 1.1 (L) 11/04/2015    AST 19 09/10/2020    ALT 23 09/10/2020        Lipid Panel  Lab Results   Component Value Date    CHLPL 213 (H) 10/30/2014    HDL 44 09/10/2020     (H) 09/10/2020    TRIG 161 (H) 09/10/2020        TSH  Lab Results   Component Value Date    TSH 0.991 09/10/2020    FREET4 1.55 12/31/2019        Hemoglobin A1C  Lab Results   Component Value Date    HGBA1C 10.0 02/08/2021        Microalbumin/Creatinine  Lab Results   Component Value Date    MALBCRERATIO  09/10/2020      Comment:      Unable to calculate    MICROALBUR <1.2 09/10/2020           Assessment / Plan      Assessment & Plan:  Diagnoses and all orders for this visit:    1. Uncontrolled type 2 diabetes mellitus with hyperglycemia (CMS/[facility]) (Primary)  Assessment & Plan:  Diabetes is unchanged.   Continue current treatment regimen.  Diabetes will be reassessed in 3 months.    She has been stressed.  Her father recently passed away.  She hasn't been taking care of her diabetes but hopes to get back on track.  Increase ozempic.      Orders:  -     POC Glycosylated Hemoglobin (Hb A1C)    2. Essential hypertension  Assessment & Plan:  Hypertension is unchanged.  Continue current treatment regimen.  Blood pressure will be reassessed in 3 months.    SBP a bit elevated.  She hasn't been sleeping.  She is working with her PCP on this.      3. Multinodular goiter    4. Postoperative hypothyroidism  Assessment & Plan:  Check TSH today.    Orders:  -     TSH; Future    5. Atherosclerosis of native coronary artery of native heart without angina pectoris  Assessment & Plan:  Continue GLP-1 RA.      6. Type 2 diabetes mellitus without complication, with long-term current use of insulin (CMS/[facility])    Other orders  -     Semaglutide, 1 MG/DOSE, (Ozempic, 1 MG/DOSE,) 2  MG/1.5ML solution pen-injector; Inject 1 mg under the skin into the appropriate area as directed 1 (One) Time Per Week.  Dispense: 9 mL; Refill: 3      Return in about 3 months (around 5/8/2021) for Recheck with A1c, CMP, lipids, TSH, microalbumin.    Kristian Bishop MD   02/08/2021

## 2021-02-08 NOTE — ASSESSMENT & PLAN NOTE
Diabetes is unchanged.   Continue current treatment regimen.  Diabetes will be reassessed in 3 months.    She has been stressed.  Her father recently passed away.  She hasn't been taking care of her diabetes but hopes to get back on track.  Increase ozempic.

## 2021-03-18 ENCOUNTER — BULK ORDERING (OUTPATIENT)
Dept: CASE MANAGEMENT | Facility: OTHER | Age: 56
End: 2021-03-18

## 2021-03-18 DIAGNOSIS — Z23 IMMUNIZATION DUE: ICD-10-CM

## 2021-04-02 ENCOUNTER — CONSULT (OUTPATIENT)
Dept: CARDIOLOGY | Facility: CLINIC | Age: 56
End: 2021-04-02

## 2021-04-02 ENCOUNTER — TELEPHONE (OUTPATIENT)
Dept: CARDIOLOGY | Facility: CLINIC | Age: 56
End: 2021-04-02

## 2021-04-02 VITALS
DIASTOLIC BLOOD PRESSURE: 88 MMHG | HEIGHT: 66 IN | HEART RATE: 76 BPM | WEIGHT: 283 LBS | BODY MASS INDEX: 45.48 KG/M2 | SYSTOLIC BLOOD PRESSURE: 118 MMHG | OXYGEN SATURATION: 97 %

## 2021-04-02 DIAGNOSIS — Z79.4 TYPE 2 DIABETES MELLITUS WITHOUT COMPLICATION, WITH LONG-TERM CURRENT USE OF INSULIN (HCC): ICD-10-CM

## 2021-04-02 DIAGNOSIS — R07.9 CHEST PAIN WITH NORMAL CORONARY ANGIOGRAPHY: ICD-10-CM

## 2021-04-02 DIAGNOSIS — R00.2 PALPITATIONS: Primary | ICD-10-CM

## 2021-04-02 DIAGNOSIS — E66.01 MORBID OBESITY (HCC): ICD-10-CM

## 2021-04-02 DIAGNOSIS — E78.5 HYPERLIPIDEMIA LDL GOAL <70: Chronic | ICD-10-CM

## 2021-04-02 DIAGNOSIS — E11.65 UNCONTROLLED TYPE 2 DIABETES MELLITUS WITH HYPERGLYCEMIA (HCC): ICD-10-CM

## 2021-04-02 DIAGNOSIS — E11.9 TYPE 2 DIABETES MELLITUS WITHOUT COMPLICATION, WITH LONG-TERM CURRENT USE OF INSULIN (HCC): ICD-10-CM

## 2021-04-02 DIAGNOSIS — I10 ESSENTIAL HYPERTENSION: ICD-10-CM

## 2021-04-02 PROBLEM — I25.10 CORONARY ARTERY DISEASE INVOLVING NATIVE CORONARY ARTERY OF NATIVE HEART: Chronic | Status: ACTIVE | Noted: 2021-02-08

## 2021-04-02 PROBLEM — K62.5 RECTAL BLEED: Status: RESOLVED | Noted: 2021-01-14 | Resolved: 2021-04-02

## 2021-04-02 PROBLEM — E04.2 MULTIPLE THYROID NODULES: Status: RESOLVED | Noted: 2019-10-24 | Resolved: 2021-04-02

## 2021-04-02 PROBLEM — R42 DIZZINESS: Status: ACTIVE | Noted: 2021-04-02

## 2021-04-02 PROCEDURE — 99244 OFF/OP CNSLTJ NEW/EST MOD 40: CPT | Performed by: INTERNAL MEDICINE

## 2021-04-02 PROCEDURE — 93000 ELECTROCARDIOGRAM COMPLETE: CPT | Performed by: INTERNAL MEDICINE

## 2021-04-02 RX ORDER — LOSARTAN POTASSIUM 25 MG/1
25 TABLET ORAL DAILY
Start: 2021-04-02 | End: 2021-07-07 | Stop reason: SDUPTHER

## 2021-04-02 RX ORDER — SEMAGLUTIDE 1.34 MG/ML
1 INJECTION, SOLUTION SUBCUTANEOUS WEEKLY
Qty: 9 ML | Refills: 3
Start: 2021-04-02 | End: 2022-07-06 | Stop reason: DRUGHIGH

## 2021-04-02 RX ORDER — METOPROLOL SUCCINATE 50 MG/1
50 TABLET, EXTENDED RELEASE ORAL DAILY
Qty: 90 TABLET | Refills: 3 | Status: SHIPPED | OUTPATIENT
Start: 2021-04-02

## 2021-04-02 NOTE — PROGRESS NOTES
[facility] at [facility]  Cardiology Consultation Note     Ivette Khalil  1965  Requesting Provider: DAVI Kelsey  PCP: Cady Sanchez APRN    ID:  Ivette Khalil is a 55 y.o. female who resides in [place], [place].    REASON FOR CONSULTATION:    • Palpitations  • Hospital follow-up from [facility], 11/2020  • Hypertension  • Type 2 diabetes         Dear MsGarrison Daniel:    Thank you for referring Ivette Khalil to my office.  The patient is a 55-year-old female with a history of normal coronary arteries, structurally normal heart on recent echo, and multiple cardiac risk factors.  The patient was hospitalized at [facility] in November 2020 for evaluation of TIA/CVA.  The patient presented initially to [facility] ER with severe dizziness.  She was transferred to [place] for neurology evaluation and MRI.  I have reviewed these records and it appears that there was no stroke seen on the MRI.  An echocardiogram was performed during the admission which was normal. Neurology recommended treatment for hypertension and diabetes.    The patient reports occasional palpitation symptoms.  These occur about once every couple weeks.  They last for approximately 1 minute.  She describes the palpitations as fast and pounding.  They can sometimes cause her discomfort.  They resolve spontaneously and have no exacerbating or alleviating factors.    She notes that she had been experiencing elevated blood sugars as high as 400 to 500 recently which she believes may have been related to stress after her father passed away, but she notes that they have improved recently, decreasing to approximately 180. She reports that her last A1c was around 8.4%. She also notes that she gained some weight around the time that her father passed away, but she is currently working on losing weight and has recently lost approximately 17 pounds.    The patient reports that she is  currently taking her metoprolol once daily and takes losartan at night. She notes that she had taken Lipitor in the past, but she stopped it due to pain in her legs.    The patient underwent cardiac catheterization with my partner in 2016 which showed normal coronary arteries.      Past Medical History, Past Surgical History, Family history, Social History, and Medications were all reviewed with the patient today and updated as necessary.       Current Outpatient Medications:   •  Comfort EZ Pen Needles 31G X 5 MM misc, , Disp: , Rfl:   •  hydrOXYzine (ATARAX) 10 MG tablet, 10 mg every night at bedtime., Disp: , Rfl:   •  ibuprofen (ADVIL,MOTRIN) 200 MG tablet, Take 200 mg by mouth Every 6 (Six) Hours As Needed for Mild Pain ., Disp: , Rfl:   •  Insulin Aspart (NOVOLOG SC), Inject 12 Units under the skin into the appropriate area as directed 3 (Three) Times a Day., Disp: , Rfl:   •  Insulin Degludec (TRESIBA SC), Inject 20 Units under the skin into the appropriate area as directed 2 (Two) Times a Day., Disp: , Rfl:   •  levothyroxine (SYNTHROID, LEVOTHROID) 200 MCG tablet, 200 mcg Daily., Disp: , Rfl:   •  losartan (COZAAR) 25 MG tablet, Take 1 tablet by mouth Daily., Disp: , Rfl:   •  meloxicam (MOBIC) 7.5 MG tablet, 1 Oral Daily with food., Disp: 90 tablet, Rfl: 2  •  omeprazole (priLOSEC) 40 MG capsule, Take 40 mg by mouth Daily., Disp: , Rfl:   •  PARoxetine (PAXIL) 20 MG tablet, Take 40 mg by mouth Every Morning., Disp: , Rfl:   •  Semaglutide, 1 MG/DOSE, (Ozempic, 1 MG/DOSE,) 2 MG/1.5ML solution pen-injector, Inject 1 mg under the skin into the appropriate area as directed 1 (One) Time Per Week., Disp: 9 mL, Rfl: 3  •  True Metrix Blood Glucose Test test strip, , Disp: , Rfl:   •  metoprolol succinate XL (TOPROL-XL) 50 MG 24 hr tablet, Take 1 tablet by mouth Daily., Disp: 90 tablet, Rfl: 3    Allergies   Allergen Reactions   • Morphine Hives   • Prednisone Hives     Also, Swelling and skin redness   •  Atorvastatin Myalgia   • Codeine Hives   • Penicillins Hives     Does ok with Keflex         Past Medical History:   Diagnosis Date   • Acute bronchitis    • Anxiety    • Arthritis    • Blood in stool    • Broken arm     left arm broke when feel off horse around    • Constipation    • Delayed emergence from anesthesia    • Diabetes mellitus (CMS/HCC)    • Disease of thyroid gland     thyroidectomy   • Elevated cholesterol    • GERD (gastroesophageal reflux disease)    • Heart attack (CMS/HCC)     unsure of when    • Hiatal hernia    • History of diverticulosis    • History of ear infections    • Hypertension    • Migraine    • Obesity    • PONV (postoperative nausea and vomiting)    • Sleep apnea     no bipap or cpap used   • Tinnitus    • Wears glasses     reading glasses only       Past Surgical History:   Procedure Laterality Date   • APPENDECTOMY      open   • BILATERAL BREAST REDUCTION     • BLADDER SURGERY      mesh removed 2nd to complications   • CARDIAC CATHETERIZATION N/A 2016    Procedure: Left Heart Cath- Right radial approach. ;  Surgeon: Shalom Giron MD;  Location: [facility] CATH INVASIVE LOCATION;  Service:    •  SECTION     • CHOLECYSTECTOMY      laproscopic   • COLON RESECTION      sigmoidectomy with multiple post operations and packing   • COLONOSCOPY     • COLONOSCOPY N/A 2021    Procedure: COLONOSCOPY;  Surgeon: Brien Turner MD;  Location: [facility] ENDOSCOPY;  Service: Gastroenterology;  Laterality: N/A;   • HYSTERECTOMY      removal of both ovaries/complete   • ORIF HUMERUS FRACTURE     • REDUCTION MAMMAPLASTY     • SHOULDER SURGERY Left    • SIGMOIDECTOMY     • THYROIDECTOMY N/A 10/24/2019    Procedure: THYROIDECTOMY TOTAL;  Surgeon: Layo Perera MD;  Location: [facility] OR;  Service: General       Family History   Problem Relation Age of Onset   • Diabetes Mother    • Heart disease Mother    • Hypertension Mother    • Other Father         black lung   • Diabetes  "Father    • Hypertension Father    • Heart disease Father    • COPD Father    • Diabetes Sister    • Heart disease Brother    • Heart attack Brother         Massive MI at 51       Social History     Tobacco Use   • Smoking status: Never Smoker   • Smokeless tobacco: Never Used   Substance Use Topics   • Alcohol use: No       Review of Systems   Cardiovascular: Positive for dyspnea on exertion, leg swelling and palpitations.   Respiratory: Positive for shortness of breath.                /88 (BP Location: Right arm, Patient Position: Sitting)   Pulse 76   Ht 167.6 cm (66\")   Wt 128 kg (283 lb)   SpO2 97%   BMI 45.68 kg/m²        Constitutional:       Appearance: Healthy appearance. Well-developed.   Eyes:      General: Lids are normal. No scleral icterus.     Conjunctiva/sclera: Conjunctivae normal.   HENT:      Head: Normocephalic and atraumatic.   Neck:      Thyroid: No thyromegaly.      Vascular: No carotid bruit or JVD.   Pulmonary:      Effort: Pulmonary effort is normal.      Breath sounds: Normal breath sounds. No wheezing. No rhonchi. No rales.   Cardiovascular:      Normal rate. Regular rhythm.      Murmurs: There is no murmur.      No gallop. No rub.   Pulses:     Intact distal pulses.   Abdominal:      General: There is no distension.      Palpations: Abdomen is soft. There is no abdominal mass.   Musculoskeletal:      Cervical back: Normal range of motion. Skin:     General: Skin is warm and dry.      Findings: No rash.   Neurological:      General: No focal deficit present.      Mental Status: Alert and oriented to person, place, and time.      Gait: Gait is intact.   Psychiatric:         Attention and Perception: Attention normal.         Mood and Affect: Mood normal.         Behavior: Behavior normal.             ECG 12 Lead    Date/Time: 4/2/2021 10:30 AM  Performed by: Miles Dueñas IV, MD  Authorized by: Miles Dueñas IV, MD   Comparison: compared with previous ECG " from 10/24/2019  Similar to previous ECG  Rhythm: sinus rhythm  BPM: 76    Clinical impression: normal ECG          Records from [facility] from 11/2020 reviewed.  Patient transferred from [facility] to [place] for progressive dizziness.  Neuro-oncology evaluation performed.  MRI showed no acute findings, just chronic microvascular changes.  Echo and carotid studies normal.      Lab Results   Component Value Date    CHOL 206 (H) 09/10/2020    HDL 44 09/10/2020     (H) 09/10/2020    VLDL 32.2 09/10/2020     Common labs    Common Labsle 9/10/20 9/10/20 9/10/20 9/10/20 9/10/20 2/8/21    1146 1146 1146 1146 1146    Glucose    237 (A)     BUN    11     Creatinine    0.59     eGFR Non African Am    105     Sodium    134 (A)     Potassium    4.4     Chloride    99     Calcium    8.7     Albumin    4.00     Total Bilirubin    0.3     Alkaline Phosphatase    134 (A)     AST (SGOT)    19     ALT (SGPT)    23     WBC   7.51      Hemoglobin   13.9      Hematocrit   42.3      Platelets   295      Total Cholesterol     206 (A)    Triglycerides     161 (A)    HDL Cholesterol     44    LDL Cholesterol      130 (A)    Hemoglobin A1C  8.19 (A)    10.0   Microalbumin, Urine <1.2        (A) Abnormal value                   Problem List Items Addressed This Visit        Cardiology Problems    Palpitations - Primary    Overview     · Echo at [facility] (11/2020): Normal LVEF.  No significant valve abnormality  · Intermittent palpitation symptoms lasting 1 minute in duration, Spring 2021  · Normal EKG, 4/2/2021         Current Assessment & Plan     · Symptoms sound to be benign in nature, possibly ventricular bigeminy/trigeminy  · Obtain 14-day Holter monitor         Relevant Medications    metoprolol succinate XL (TOPROL-XL) 50 MG 24 hr tablet    Other Relevant Orders    Holter Monitor - 72 Hour Up To 15 Days    Essential hypertension (Chronic)    Overview     • Target blood pressure <130/80 mmHg         Current  Assessment & Plan     · Controlled  · Continue present medical therapy         Relevant Medications    metoprolol succinate XL (TOPROL-XL) 50 MG 24 hr tablet    losartan (COZAAR) 25 MG tablet    Hyperlipidemia LDL goal <70 (Chronic)    Overview     • Statin therapy indicated given diabetic status         Current Assessment & Plan     · Recommend statin therapy due to diabetic status  · Defer to endocrinology            Other    Uncontrolled type 2 diabetes mellitus with hyperglycemia (CMS/[facility])    Current Assessment & Plan     · Uncontrolled  · Refer to endocrinology, but consider SGLT2 inhibitor therapy  · Consider statin therapy due to diabetic status         Relevant Medications    Semaglutide, 1 MG/DOSE, (Ozempic, 1 MG/DOSE,) 2 MG/1.5ML solution pen-injector    Long-term insulin use in type 2 diabetes (CMS/[facility])    Relevant Medications    Semaglutide, 1 MG/DOSE, (Ozempic, 1 MG/DOSE,) 2 MG/1.5ML solution pen-injector    Chest pain with normal coronary angiography    Overview     · Cardiac catherization (09/26/2016): Normal coronary arteries.  LVEF 70%  · Echocardiogram (09/27/2016): EF 60%. No valvular abnormalities.   · Carotid duplex (11/22/2020): <20% LANE and LICA.  · [facility]ocardi[facility] at [facility] (11/22/2020): EF 55%. No valvular abnormalities.          Current Assessment & Plan     · No anginal symptoms         Morbid obesity (CMS/[facility])    Current Assessment & Plan     · Weight loss recommended  · Reduce carbohydrate, increase protein diet  · Recommend aerobic and strength training exercise                        · 14-day Holter monitor  · Consider SGLT 2 inhibitor therapy as well as statin therapy  If monitor benign, follow-up with me as needed          BRITNEY Dueñas MD, [facility]  Interventional Cardiology  04/02/21  13:15 EDT     Scribed for Miles Dueñas IV, MD by EMELY VAZQUEZ.  04/02/21   12:16 EDT    I have personally performed the services described in this document as scribed  by the above individual, and it is both accurate and complete.  Miles Dueñas IV, MD  4/2/2021  13:09 EDT

## 2021-04-02 NOTE — ASSESSMENT & PLAN NOTE
· Uncontrolled  · Refer to endocrinology, but consider SGLT2 inhibitor therapy  · Consider statin therapy due to diabetic status

## 2021-04-02 NOTE — ASSESSMENT & PLAN NOTE
· Weight loss recommended  · Reduce carbohydrate, increase protein diet  · Recommend aerobic and strength training exercise

## 2021-04-02 NOTE — ASSESSMENT & PLAN NOTE
· Symptoms sound to be benign in nature, possibly ventricular bigeminy/trigeminy  · Obtain 14-day Holter monitor

## 2021-04-20 NOTE — TELEPHONE ENCOUNTER
PT CALLED AND STATED SHE IS EXPERIENCING LEG PAIN, SHE IS ALSO EXPERIENCING PINS AND NEEDLES IN HER ANKLE AND FOOT, SHE WOULD LIKE TO BE PRESCRIBED SOMETHING TO HELP, PLEASE ADVISE.

## 2021-04-20 NOTE — TELEPHONE ENCOUNTER
Spoke with Dr. Bishop.  If patient is agreeble, can stop the Paxil and start Duloxetine 60mg daily.  Left message for patient to return call.

## 2021-04-21 RX ORDER — DULOXETIN HYDROCHLORIDE 60 MG/1
60 CAPSULE, DELAYED RELEASE ORAL DAILY
Qty: 30 CAPSULE | Refills: 2 | Status: SHIPPED | OUTPATIENT
Start: 2021-04-21 | End: 2021-05-19

## 2021-05-11 ENCOUNTER — TELEPHONE (OUTPATIENT)
Dept: ENDOCRINOLOGY | Facility: CLINIC | Age: 56
End: 2021-05-11

## 2021-05-19 ENCOUNTER — OFFICE VISIT (OUTPATIENT)
Dept: ENDOCRINOLOGY | Facility: CLINIC | Age: 56
End: 2021-05-19

## 2021-05-19 VITALS
WEIGHT: 279 LBS | OXYGEN SATURATION: 95 % | TEMPERATURE: 97.8 F | BODY MASS INDEX: 44.84 KG/M2 | HEART RATE: 84 BPM | HEIGHT: 66 IN | DIASTOLIC BLOOD PRESSURE: 28 MMHG | SYSTOLIC BLOOD PRESSURE: 130 MMHG

## 2021-05-19 DIAGNOSIS — I10 ESSENTIAL HYPERTENSION: Chronic | ICD-10-CM

## 2021-05-19 DIAGNOSIS — E11.49 DM (DIABETES MELLITUS), TYPE 2 WITH NEUROLOGICAL COMPLICATIONS (HCC): ICD-10-CM

## 2021-05-19 DIAGNOSIS — E11.65 UNCONTROLLED TYPE 2 DIABETES MELLITUS WITH HYPERGLYCEMIA (HCC): Primary | ICD-10-CM

## 2021-05-19 DIAGNOSIS — E78.5 HYPERLIPIDEMIA LDL GOAL <70: Chronic | ICD-10-CM

## 2021-05-19 DIAGNOSIS — E11.9 TYPE 2 DIABETES MELLITUS WITHOUT COMPLICATION, WITH LONG-TERM CURRENT USE OF INSULIN (HCC): ICD-10-CM

## 2021-05-19 DIAGNOSIS — E89.0 POSTOPERATIVE HYPOTHYROIDISM: ICD-10-CM

## 2021-05-19 DIAGNOSIS — Z79.4 TYPE 2 DIABETES MELLITUS WITHOUT COMPLICATION, WITH LONG-TERM CURRENT USE OF INSULIN (HCC): ICD-10-CM

## 2021-05-19 LAB
EXPIRATION DATE: ABNORMAL
EXPIRATION DATE: NORMAL
GLUCOSE BLDC GLUCOMTR-MCNC: 230 MG/DL (ref 70–130)
HBA1C MFR BLD: 9.7 %
Lab: ABNORMAL
Lab: NORMAL

## 2021-05-19 PROCEDURE — 83036 HEMOGLOBIN GLYCOSYLATED A1C: CPT | Performed by: INTERNAL MEDICINE

## 2021-05-19 PROCEDURE — 82947 ASSAY GLUCOSE BLOOD QUANT: CPT | Performed by: INTERNAL MEDICINE

## 2021-05-19 PROCEDURE — 99214 OFFICE O/P EST MOD 30 MIN: CPT | Performed by: INTERNAL MEDICINE

## 2021-05-19 RX ORDER — AMITRIPTYLINE HYDROCHLORIDE 25 MG/1
25 TABLET, FILM COATED ORAL NIGHTLY
Qty: 30 TABLET | Refills: 5 | Status: SHIPPED | OUTPATIENT
Start: 2021-05-19 | End: 2021-09-15

## 2021-05-19 RX ORDER — DAPAGLIFLOZIN 10 MG/1
10 TABLET, FILM COATED ORAL
Qty: 30 TABLET | Refills: 5 | Status: SHIPPED | OUTPATIENT
Start: 2021-05-19 | End: 2021-05-28 | Stop reason: SINTOL

## 2021-05-19 RX ORDER — PEN NEEDLE, DIABETIC 31 GX5/16"
NEEDLE, DISPOSABLE MISCELLANEOUS
Qty: 300 EACH | Refills: 1 | Status: SHIPPED | OUTPATIENT
Start: 2021-05-19 | End: 2022-03-25 | Stop reason: SDUPTHER

## 2021-05-19 RX ORDER — PAROXETINE HYDROCHLORIDE 40 MG/1
40 TABLET, FILM COATED ORAL DAILY
Qty: 30 TABLET | Refills: 5 | Status: SHIPPED | OUTPATIENT
Start: 2021-05-19 | End: 2022-12-08

## 2021-05-19 RX ORDER — NAPROXEN SODIUM 220 MG
TABLET ORAL
Qty: 300 EACH | Refills: 1 | Status: SHIPPED | OUTPATIENT
Start: 2021-05-19

## 2021-05-19 NOTE — ASSESSMENT & PLAN NOTE
Diabetes is improving with treatment.   Continue current treatment regimen.  Try adding SGLT-2 inhibitor.  Potential s/e discussed.  Diabetes will be reassessed in 3 months.

## 2021-05-19 NOTE — PROGRESS NOTES
"     Office Note      Date: 2021  Patient Name: Ivette Khalil  MRN: [id number]  : 1965    Chief Complaint   Patient presents with   • Diabetes       History of Present Illness:   Ivette Khalil is a 55 y.o. female who presents for Diabetes type 2. Diagnosed in: . Treated in past with oral agents. Current treatments: ozempic and basal bolus insulin. Number of insulin shots per day: 4. Checks blood sugar none times a day. Has low blood sugar: no. Aspirin use: No -  PUD. Statin use: No - intolerant. ACE-I/ARB use: stopped by cardiology. Changes in health since last visit: none. Last eye exam 2019.    She called with complaints of pain and tingling in her feet.  We stopped paxil and started duloxetine.  It didn't help the feet but her anxiety got worse.     She had thyroidectomy in 2019 for benign goiter.  She is on T4 200mcg qd.  She is taking this correctly.  She isn't taking any interfering meds concurrently.  She hasn't noted any change in the size of her neck.  She denies any compressive sxs.  She denies any sxs of hypo- or hyperthyroidism at this time.     Subjective      Diabetic Complications:  Eyes: No  Kidneys: No  Feet: burning in feet  Heart: Yes -      Diet and Exercise:  Meals per day: 3  Minutes of exercise per week: 0 mins.    Review of Systems:   Review of Systems   Constitutional: Negative.    Cardiovascular: Negative.    Gastrointestinal: Negative.    Endocrine: Negative.        The following portions of the patient's history were reviewed and updated as appropriate: allergies, current medications, past family history, past medical history, past social history, past surgical history and problem list.    Objective       Visit Vitals  BP (!) 130/28 (BP Location: Right arm, Patient Position: Sitting, Cuff Size: Adult)   Pulse 84   Temp 97.8 °F (36.6 °C) (Infrared)   Ht 167.6 cm (66\")   Wt 127 kg (279 lb)   SpO2 95%   BMI 45.03 kg/m²       Physical Exam:  Physical " Exam  Constitutional:       Appearance: Normal appearance.   Neurological:      Mental Status: She is alert.         Labs:    HbA1c  Lab Results   Component Value Date    HGBA1C 9.7 05/19/2021       CMP  Lab Results   Component Value Date    GLUCOSE 237 (H) 09/10/2020    BUN 11 09/10/2020    CREATININE 0.59 09/10/2020    EGFRIFNONA 105 09/10/2020    BCR 18.6 09/10/2020    K 4.4 09/10/2020    CO2 27.6 09/10/2020    CALCIUM 8.7 09/10/2020    LABIL2 1.1 (L) 11/04/2015    AST 19 09/10/2020    ALT 23 09/10/2020        Lipid Panel  Lab Results   Component Value Date    CHLPL 213 (H) 10/30/2014    HDL 44 09/10/2020     (H) 09/10/2020    TRIG 161 (H) 09/10/2020        TSH  Lab Results   Component Value Date    TSH 3.100 02/08/2021    FREET4 1.55 12/31/2019        Hemoglobin A1C  Lab Results   Component Value Date    HGBA1C 9.7 05/19/2021        Microalbumin/Creatinine  Lab Results   Component Value Date    MALBCRERATIO  09/10/2020      Comment:      Unable to calculate    MICROALBUR <1.2 09/10/2020           Assessment / Plan      Assessment & Plan:  Diagnoses and all orders for this visit:    1. Uncontrolled type 2 diabetes mellitus with hyperglycemia (CMS/[facility]) (Primary)  Assessment & Plan:  Diabetes is improving with treatment.   Continue current treatment regimen.  Try adding SGLT-2 inhibitor.  Potential s/e discussed.  Diabetes will be reassessed in 3 months.    Orders:  -     POC Glycosylated Hemoglobin (Hb A1C)  -     POC Glucose, Blood    2. Postoperative hypothyroidism  Assessment & Plan:  Continue T4.  Check TSH next visit.      3. Essential hypertension  Assessment & Plan:  SBP a bit low.  Cardiologist has been adjusting her meds.      4. Hyperlipidemia LDL goal <70  Assessment & Plan:  Hasn't tolerated statin.  Check lipids next visit.      5. Type 2 diabetes mellitus without complication, with long-term current use of insulin (CMS/[facility])    6. DM (diabetes mellitus), type 2 with neurological complications  "(CMS/[facility])  Assessment & Plan:  Having some neuropathy symptoms.  Didn't respond to duloxetine.  Will try amitriptyline.  Potential s/e discussed.      Other orders  -     PARoxetine (Paxil) 40 MG tablet; Take 1 tablet by mouth Daily.  Dispense: 30 tablet; Refill: 5  -     Comfort EZ Pen Needles 31G X 5 MM misc; Use TID  Dispense: 300 each; Refill: 1  -     Insulin Syringe 31G X 5/16\" 0.5 ML misc; Use TID  Dispense: 300 each; Refill: 1  -     amitriptyline (ELAVIL) 25 MG tablet; Take 1 tablet by mouth Every Night.  Dispense: 30 tablet; Refill: 5  -     Dapagliflozin Propanediol (Farxiga) 10 MG tablet; Take 10 mg by mouth Daily With Breakfast.  Dispense: 30 tablet; Refill: 5      Return in about 3 months (around 8/19/2021) for Recheck with A1c, CMP, lipids, TSH, microalbumin.    Kristian Bishop MD   05/19/2021  "

## 2021-05-19 NOTE — ASSESSMENT & PLAN NOTE
Having some neuropathy symptoms.  Didn't respond to duloxetine.  Will try amitriptyline.  Potential s/e discussed.

## 2021-05-28 ENCOUNTER — TELEPHONE (OUTPATIENT)
Dept: ENDOCRINOLOGY | Facility: CLINIC | Age: 56
End: 2021-05-28

## 2021-05-28 RX ORDER — FLUCONAZOLE 150 MG/1
150 TABLET ORAL ONCE
Qty: 1 TABLET | Refills: 0 | Status: SHIPPED | OUTPATIENT
Start: 2021-05-28 | End: 2021-05-28

## 2021-05-28 NOTE — TELEPHONE ENCOUNTER
The duloxetine should not be causing a yeast infection.  The farxiga could though.  She can stop the farxiga.

## 2021-05-28 NOTE — TELEPHONE ENCOUNTER
PATIENT STATES THAT SHE WAS RECENTLY PRESCRIBED DULOXETINE. SHE IS REQUESTING TO BE PRESCRIBED SOMETHING ELSE AS THIS MEDICATION IS GIVING HER A YEAST INFECTION. SHE IS ALSO REQUESTING AN RX FOR THE YEAST INFECTION. PLEASE SEND TO [facility] IN [place].     CALL BACK [phone]

## 2021-06-11 ENCOUNTER — DOCUMENTATION (OUTPATIENT)
Dept: CARDIOLOGY | Facility: CLINIC | Age: 56
End: 2021-06-11

## 2021-06-11 NOTE — PROGRESS NOTES
I tried to make contact with the patient to let her know the results of her monitor which are benign showing predominantly normal sinus rhythm and one episode of nonspecific SVT lasting only 12 beats.  I would recommend the patient start an SGL 2 inhibitor for her diabetes mellitus as well as statin therapy.  She can discuss this with her primary care provider. She can follow-up with cardiology as needed      DAVI Dueñas

## 2021-07-07 ENCOUNTER — TELEPHONE (OUTPATIENT)
Dept: CARDIOLOGY | Facility: CLINIC | Age: 56
End: 2021-07-07

## 2021-07-07 DIAGNOSIS — I10 ESSENTIAL HYPERTENSION: ICD-10-CM

## 2021-07-07 RX ORDER — LOSARTAN POTASSIUM 25 MG/1
25 TABLET ORAL DAILY
Qty: 90 TABLET | Refills: 0 | Status: SHIPPED | OUTPATIENT
Start: 2021-07-07 | End: 2022-07-06

## 2021-07-07 NOTE — TELEPHONE ENCOUNTER
The patient reports she thinks she stopped the losartan and needs a refill. She just needs the dose she was supposed to be taking since she stopped. Please sign script. Thanks!

## 2021-07-07 NOTE — TELEPHONE ENCOUNTER
Patient is calling to report that her BP has been elevated. This /93. OK with you to increase losartan to 50 mg and get BMP in 1 week?

## 2021-09-15 ENCOUNTER — TELEPHONE (OUTPATIENT)
Dept: ENDOCRINOLOGY | Facility: CLINIC | Age: 56
End: 2021-09-15

## 2021-09-15 ENCOUNTER — OFFICE VISIT (OUTPATIENT)
Dept: ENDOCRINOLOGY | Facility: CLINIC | Age: 56
End: 2021-09-15

## 2021-09-15 VITALS
SYSTOLIC BLOOD PRESSURE: 118 MMHG | BODY MASS INDEX: 43.71 KG/M2 | OXYGEN SATURATION: 98 % | WEIGHT: 272 LBS | HEART RATE: 75 BPM | DIASTOLIC BLOOD PRESSURE: 85 MMHG | HEIGHT: 66 IN

## 2021-09-15 DIAGNOSIS — E78.5 HYPERLIPIDEMIA LDL GOAL <70: Chronic | ICD-10-CM

## 2021-09-15 DIAGNOSIS — E11.49 DM (DIABETES MELLITUS), TYPE 2 WITH NEUROLOGICAL COMPLICATIONS (HCC): ICD-10-CM

## 2021-09-15 DIAGNOSIS — IMO0002 DIABETES MELLITUS TYPE 2, UNCONTROLLED, WITH COMPLICATIONS: Primary | ICD-10-CM

## 2021-09-15 DIAGNOSIS — I10 ESSENTIAL HYPERTENSION: Chronic | ICD-10-CM

## 2021-09-15 DIAGNOSIS — Z79.4 INSULIN LONG-TERM USE (HCC): ICD-10-CM

## 2021-09-15 DIAGNOSIS — E89.0 POSTOPERATIVE HYPOTHYROIDISM: ICD-10-CM

## 2021-09-15 DIAGNOSIS — E11.65 UNCONTROLLED TYPE 2 DIABETES MELLITUS WITH HYPERGLYCEMIA (HCC): ICD-10-CM

## 2021-09-15 LAB
EXPIRATION DATE: ABNORMAL
EXPIRATION DATE: NORMAL
GLUCOSE BLDC GLUCOMTR-MCNC: 385 MG/DL (ref 70–130)
HBA1C MFR BLD: 12 %
Lab: ABNORMAL
Lab: NORMAL

## 2021-09-15 PROCEDURE — 99214 OFFICE O/P EST MOD 30 MIN: CPT | Performed by: INTERNAL MEDICINE

## 2021-09-15 PROCEDURE — 82947 ASSAY GLUCOSE BLOOD QUANT: CPT | Performed by: INTERNAL MEDICINE

## 2021-09-15 RX ORDER — INSULIN DEGLUDEC INJECTION 100 U/ML
35 INJECTION, SOLUTION SUBCUTANEOUS 2 TIMES DAILY
Qty: 63 ML | Refills: 3 | Status: SHIPPED | OUTPATIENT
Start: 2021-09-15 | End: 2021-11-10 | Stop reason: SDUPTHER

## 2021-09-15 RX ORDER — GABAPENTIN 100 MG/1
100 CAPSULE ORAL 2 TIMES DAILY
Qty: 180 CAPSULE | Refills: 3 | Status: SHIPPED | OUTPATIENT
Start: 2021-09-15 | End: 2022-03-25 | Stop reason: DRUGHIGH

## 2021-09-15 NOTE — PROGRESS NOTES
"     Office Note      Date: 09/15/2021  Patient Name: Ivette Khalil  MRN: [id number]  : 1965    Chief Complaint   Patient presents with   • Diabetes       History of Present Illness:   Ivette Khalil is a 55 y.o. female who presents for Diabetes type 2. Diagnosed in: . Treated in past with oral agents. She didn't tolerate farxiga due to yeast infections.  Current treatments: ozempic and basal bolus insulin. Number of insulin shots per day: 4. Checks blood sugar two times a day. Has low blood sugar: no. Aspirin use: No -  PUD. Statin use: No - intolerant. ACE-I/ARB use: stopped by cardiology. Changes in health since last visit: none. Last eye exam .     At the last visit we prescribed amitriptyline.  It didn't seem to help.  We prescribed duloxetine 60mg.  It seemed to help but it is worse now.  She c/o burning of her feet that interferes with sleep.     She had thyroidectomy in 2019 for benign goiter.  She is on T4 200mcg qd.  She is taking this correctly.  She isn't taking any interfering meds concurrently.  She hasn't noted any change in the size of her neck.  She denies any compressive sxs.  She denies any sxs of hypo- or hyperthyroidism at this time.     Subjective      Diabetic Complications:  Eyes: No  Kidneys: No  Feet: burning in feet  Heart: Yes -      Diet and Exercise:  Meals per day: 3  Minutes of exercise per week: 0 mins.    Review of Systems:   Review of Systems   Constitutional: Negative.    Cardiovascular: Negative.    Gastrointestinal: Negative.    Endocrine: Negative.        The following portions of the patient's history were reviewed and updated as appropriate: allergies, current medications, past family history, past medical history, past social history, past surgical history and problem list.    Objective       Visit Vitals  /85 (BP Location: Right arm, Patient Position: Sitting, Cuff Size: Adult)   Pulse 75   Ht 167.6 cm (66\")   Wt 123 kg (272 lb)   SpO2 98% "   BMI 43.90 kg/m²       Physical Exam:  Physical Exam  Constitutional:       Appearance: Normal appearance.   Neck:      Comments: No palpable thyroid tissue  Cardiovascular:      Pulses:           Dorsalis pedis pulses are 2+ on the right side and 2+ on the left side.        Posterior tibial pulses are 2+ on the right side and 2+ on the left side.   Feet:      Right foot:      Protective Sensation: 5 sites tested. 5 sites sensed.      Skin integrity: Skin integrity normal.      Toenail Condition: Right toenails are normal.      Left foot:      Protective Sensation: 5 sites tested. 5 sites sensed.      Skin integrity: Skin integrity normal.      Toenail Condition: Left toenails are normal.   Lymphadenopathy:      Cervical: No cervical adenopathy.   Neurological:      Mental Status: She is alert.         Labs:    HbA1c  Lab Results   Component Value Date    HGBA1C 12 09/15/2021       CMP  Lab Results   Component Value Date    GLUCOSE 237 (H) 09/10/2020    BUN 11 09/10/2020    CREATININE 0.59 09/10/2020    EGFRIFNONA 105 09/10/2020    BCR 18.6 09/10/2020    K 4.4 09/10/2020    CO2 27.6 09/10/2020    CALCIUM 8.7 09/10/2020    LABIL2 1.1 (L) 11/04/2015    AST 19 09/10/2020    ALT 23 09/10/2020        Lipid Panel  Lab Results   Component Value Date    CHLPL 213 (H) 10/30/2014    HDL 44 09/10/2020     (H) 09/10/2020    TRIG 161 (H) 09/10/2020        TSH  Lab Results   Component Value Date    TSH 3.100 02/08/2021    FREET4 1.55 12/31/2019        Hemoglobin A1C  Lab Results   Component Value Date    HGBA1C 12 09/15/2021        Microalbumin/Creatinine  Lab Results   Component Value Date    MALBCRERATIO  09/10/2020      Comment:      Unable to calculate    MICROALBUR <1.2 09/10/2020           Assessment / Plan      Assessment & Plan:  Diagnoses and all orders for this visit:    1. Diabetes mellitus type 2, uncontrolled, with complications (CMS/[facility]) (Primary)  -     POC Glycosylated Hemoglobin (Hb A1C)  -     POC  Glucose, Blood    2. Uncontrolled type 2 diabetes mellitus with hyperglycemia (CMS/[facility])  Assessment & Plan:  Diabetes is worsening.   Continue current treatment regimen.  Increase insulin.  Diabetes will be reassessed in 3 months.    We discussed bariatric surgery today.  Will make referral for her.    We discussed CGM today but she is without insurance currently.    Orders:  -     Cancel: Comprehensive Metabolic Panel; Future  -     Cancel: Lipid Panel; Future  -     Cancel: Microalbumin / Creatinine Urine Ratio - Urine, Clean Catch; Future  -     Cancel: TSH; Future  -     Ambulatory Referral to Bariatric Surgery    3. DM (diabetes mellitus), type 2 with neurological complications (CMS/[facility])  Assessment & Plan:  Work on better DM control.  She didn't respond to amitriptyline or duloxetine.  Will try gabapentin.  Potential s/e discussed.  Controlled substance agreement was signed.      Orders:  -     gabapentin (Neurontin) 100 MG capsule; Take 1 capsule by mouth 2 (two) times a day.  Dispense: 180 capsule; Refill: 3    4. Essential hypertension  Assessment & Plan:  Hypertension is unchanged.  Continue current treatment regimen.  Blood pressure will be reassessed at the next regular appointment.      5. Hyperlipidemia LDL goal <70  Assessment & Plan:  She hasn't tolerated statin.  She isn't fasting today.  Check lipids next visit.      6. Postoperative hypothyroidism  Assessment & Plan:  Continue T4 tx.  Check TSH next visit.      7. Insulin long-term use (CMS/[facility])    Other orders  -     Insulin Degludec (Tresiba) 100 UNIT/ML solution injection; Inject 35 Units under the skin into the appropriate area as directed 2 (Two) Times a Day.  Dispense: 63 mL; Refill: 3  -     insulin aspart (NovoLOG) 100 UNIT/ML injection; Inject 25 Units under the skin into the appropriate area as directed 3 (Three) Times a Day.  Dispense: 69 mL; Refill: 3      Return in about 3 months (around 12/15/2021) for Recheck with A1c, CMP, lipids,  TSH, microalbumin.    Kristian Bishop MD   09/15/2021

## 2021-09-15 NOTE — ASSESSMENT & PLAN NOTE
Work on better DM control.  She didn't respond to amitriptyline or duloxetine.  Will try gabapentin.  Potential s/e discussed.  Controlled substance agreement was signed.

## 2021-09-15 NOTE — ASSESSMENT & PLAN NOTE
Diabetes is worsening.   Continue current treatment regimen.  Increase insulin.  Diabetes will be reassessed in 3 months.    We discussed bariatric surgery today.  Will make referral for her.    We discussed CGM today but she is without insurance currently.

## 2021-10-21 ENCOUNTER — TELEPHONE (OUTPATIENT)
Dept: ENDOCRINOLOGY | Facility: CLINIC | Age: 56
End: 2021-10-21

## 2021-10-21 NOTE — TELEPHONE ENCOUNTER
Pt is calling to see if we have received her prescriptions from [facility], states her prescriptions are sent here to the office.   She has been out for 2 weeks.

## 2021-11-10 RX ORDER — INSULIN DEGLUDEC INJECTION 100 U/ML
35 INJECTION, SOLUTION SUBCUTANEOUS 2 TIMES DAILY
Qty: 21 ML | Refills: 5 | Status: SHIPPED | OUTPATIENT
Start: 2021-11-10 | End: 2021-11-15 | Stop reason: SDUPTHER

## 2021-11-10 RX ORDER — INSULIN DEGLUDEC INJECTION 100 U/ML
35 INJECTION, SOLUTION SUBCUTANEOUS 2 TIMES DAILY
Qty: 63 ML | Refills: 3 | Status: SHIPPED | OUTPATIENT
Start: 2021-11-10 | End: 2021-11-10 | Stop reason: SDUPTHER

## 2021-11-10 NOTE — TELEPHONE ENCOUNTER
PATIENT'S SPOUSE IS REQUESTING TO HAVE RX FOR TRESIBA FOR ONE MONTH SENT TO [facility]. [employer] HAS SENT THEM A VOUCHER FOR A ONE MONTH SUPPLY UNTIL FULL RX CAN BE SENT TO THIS OFFICE THROUGH THE PATIENT'S INSURANCE.

## 2021-11-11 ENCOUNTER — HOSPITAL ENCOUNTER (OUTPATIENT)
Dept: INFUSION THERAPY | Facility: HOSPITAL | Age: 56
Discharge: HOME | End: 2021-11-11
Admitting: PHYSICIAN ASSISTANT

## 2021-11-11 VITALS
RESPIRATION RATE: 20 BRPM | SYSTOLIC BLOOD PRESSURE: 188 MMHG | OXYGEN SATURATION: 95 % | TEMPERATURE: 98.2 F | HEART RATE: 105 BPM | DIASTOLIC BLOOD PRESSURE: 106 MMHG

## 2021-11-11 DIAGNOSIS — U07.1 COVID-19: Primary | ICD-10-CM

## 2021-11-11 PROCEDURE — M0243 CASIRIVI AND IMDEVI INFUSION: HCPCS | Performed by: PHYSICIAN ASSISTANT

## 2021-11-11 PROCEDURE — 25010000002 INJECTION, CASIRIVIMAB AND IMDEVIMAB, 1200 MG: Performed by: PHYSICIAN ASSISTANT

## 2021-11-11 RX ORDER — METHYLPREDNISOLONE SODIUM SUCCINATE 125 MG/2ML
125 INJECTION, POWDER, LYOPHILIZED, FOR SOLUTION INTRAMUSCULAR; INTRAVENOUS AS NEEDED
Status: DISCONTINUED | OUTPATIENT
Start: 2021-11-11 | End: 2021-11-13 | Stop reason: HOSPADM

## 2021-11-11 RX ORDER — EPINEPHRINE 1 MG/ML
0.3 INJECTION, SOLUTION INTRAMUSCULAR; SUBCUTANEOUS AS NEEDED
Status: DISCONTINUED | OUTPATIENT
Start: 2021-11-11 | End: 2021-11-13 | Stop reason: HOSPADM

## 2021-11-11 RX ORDER — DIPHENHYDRAMINE HYDROCHLORIDE 50 MG/ML
50 INJECTION INTRAMUSCULAR; INTRAVENOUS ONCE AS NEEDED
Status: DISCONTINUED | OUTPATIENT
Start: 2021-11-11 | End: 2021-11-13 | Stop reason: HOSPADM

## 2021-11-11 RX ORDER — DIPHENHYDRAMINE HCL 50 MG
50 CAPSULE ORAL ONCE AS NEEDED
Status: CANCELLED | OUTPATIENT
Start: 2021-11-11

## 2021-11-11 RX ORDER — EPINEPHRINE 1 MG/ML
0.3 INJECTION, SOLUTION INTRAMUSCULAR; SUBCUTANEOUS AS NEEDED
Status: CANCELLED | OUTPATIENT
Start: 2021-11-11

## 2021-11-11 RX ORDER — METHYLPREDNISOLONE SODIUM SUCCINATE 125 MG/2ML
125 INJECTION, POWDER, LYOPHILIZED, FOR SOLUTION INTRAMUSCULAR; INTRAVENOUS AS NEEDED
Status: CANCELLED | OUTPATIENT
Start: 2021-11-11

## 2021-11-11 RX ORDER — DIPHENHYDRAMINE HYDROCHLORIDE 50 MG/ML
50 INJECTION INTRAMUSCULAR; INTRAVENOUS ONCE AS NEEDED
Status: CANCELLED | OUTPATIENT
Start: 2021-11-11

## 2021-11-11 RX ORDER — DIPHENHYDRAMINE HCL 50 MG
50 CAPSULE ORAL ONCE AS NEEDED
Status: DISCONTINUED | OUTPATIENT
Start: 2021-11-11 | End: 2021-11-13 | Stop reason: HOSPADM

## 2021-11-11 RX ADMIN — IMDEVIMAB: 1332 INJECTION, SOLUTION, CONCENTRATE INTRAVENOUS at 13:55

## 2021-11-15 ENCOUNTER — TELEPHONE (OUTPATIENT)
Dept: ENDOCRINOLOGY | Facility: CLINIC | Age: 56
End: 2021-11-15

## 2021-11-15 RX ORDER — INSULIN DEGLUDEC INJECTION 100 U/ML
35 INJECTION, SOLUTION SUBCUTANEOUS 2 TIMES DAILY
Qty: 21 ML | Refills: 0 | Status: SHIPPED | OUTPATIENT
Start: 2021-11-15 | End: 2021-12-17 | Stop reason: SDUPTHER

## 2021-11-15 NOTE — TELEPHONE ENCOUNTER
PATIENT'S SPOUSE STATES THAT HE HAS A VOUCHER FOR ONE MONTH SUPPLY OF TRESIBA BUT NEEDS A ONE MONTH RX FOR THIS MEDICATION SENT TO [facility] IN [place] SO THAT HE CAN REDEEM THE VOUCHER FOR THE PATIENT.

## 2021-11-15 NOTE — TELEPHONE ENCOUNTER
She called back - we have not received pt assistance meds.  I recommend she contact her pt assist company

## 2021-11-15 NOTE — TELEPHONE ENCOUNTER
PATIENT IS CALLING TO CHECK STATUS OF DELIVERY OF HER NOVOLOG/TRESIBA/OZEMPIC INSULIN MEDICATIONS WHICH WAS BEING DELIVERED HERE TO OUR OFFICE FROM [facility]. PATIENTS  [place] THIS [place]ING AND WAS HOPING TO  INSULIN. PHONE NUMBER [phone]

## 2021-11-30 ENCOUNTER — TELEPHONE (OUTPATIENT)
Dept: ENDOCRINOLOGY | Facility: CLINIC | Age: 56
End: 2021-11-30

## 2021-12-03 NOTE — TELEPHONE ENCOUNTER
PT called back one more time looking if the prescription was delivered, let the PT know it has not, PT will call back on monday

## 2021-12-06 RX ORDER — LEVOTHYROXINE SODIUM 0.2 MG/1
200 TABLET ORAL DAILY
Qty: 90 TABLET | Refills: 0 | Status: SHIPPED | OUTPATIENT
Start: 2021-12-06 | End: 2022-01-03

## 2021-12-17 RX ORDER — INSULIN DEGLUDEC INJECTION 100 U/ML
35 INJECTION, SOLUTION SUBCUTANEOUS 2 TIMES DAILY
Qty: 21 ML | Refills: 0 | Status: SHIPPED | OUTPATIENT
Start: 2021-12-17

## 2021-12-17 NOTE — TELEPHONE ENCOUNTER
PATIENT'S SPOUSE CALLED TODAY REGARDING PATIENT ASSISTANCE. THE PATIENT IS CURRENTLY OUT OF MEDICATION. ORDER IS BEING PROCESSED AND WILL BE SENT TO THIS OFFICE FOR THE PATIENT TO . PATIENT ASSISTANCE HAS PROVIDED A VOUCHER FOR A 30 DAY SUPPLY FOR THE PATIENT BUT THE PATIENT WILL NEED AN RX SENT TO HER PHARMACY SO THAT THE VOUCHER CAN BE REDEEMED. PLEASE SEND 30 DAY RX FOR INSULIN TO [facility] AS SOON AS POSSIBLE SO THAT PATIENT CAN RESUME INSULIN REGIMEN.     SPOUSE STATES THAT HE WAS TOLD THAT INSULIN SHOULD ARRIVE AT THIS OFFICE WITHIN 10-14 DAYS.

## 2021-12-20 ENCOUNTER — PRIOR AUTHORIZATION (OUTPATIENT)
Dept: ENDOCRINOLOGY | Facility: CLINIC | Age: 56
End: 2021-12-20

## 2021-12-20 NOTE — TELEPHONE ENCOUNTER
Request Reference Number: [id number]. TRESIBA FLEX INJ 100UNIT is approved through 12/31/2022. Your patient may now fill this prescription and it will be covered.

## 2021-12-21 ENCOUNTER — DOCUMENTATION (OUTPATIENT)
Dept: ENDOCRINOLOGY | Facility: CLINIC | Age: 56
End: 2021-12-21

## 2021-12-21 NOTE — PROGRESS NOTES
Patient not eligible to fill specialty medication at [facility]. Reason:     [employer] [facility]  Ozempic-[place] required  Patient has [employer] insurance and we are unable to mail to [employer] patients    Rachelle Cantu CPhT  12/21/2021  11:22 EST

## 2022-01-03 RX ORDER — LEVOTHYROXINE SODIUM 0.2 MG/1
TABLET ORAL
Qty: 90 TABLET | Refills: 0 | Status: SHIPPED | OUTPATIENT
Start: 2022-01-03 | End: 2022-08-10

## 2022-01-04 ENCOUNTER — TELEPHONE (OUTPATIENT)
Dept: ENDOCRINOLOGY | Facility: CLINIC | Age: 57
End: 2022-01-04

## 2022-02-18 ENCOUNTER — TRANSCRIBE ORDERS (OUTPATIENT)
Dept: ADMINISTRATIVE | Facility: HOSPITAL | Age: 57
End: 2022-02-18

## 2022-02-18 DIAGNOSIS — R42 DIZZINESS: Primary | ICD-10-CM

## 2022-03-15 ENCOUNTER — APPOINTMENT (OUTPATIENT)
Dept: CT IMAGING | Facility: HOSPITAL | Age: 57
End: 2022-03-15

## 2022-03-25 ENCOUNTER — LAB (OUTPATIENT)
Dept: LAB | Facility: HOSPITAL | Age: 57
End: 2022-03-25

## 2022-03-25 ENCOUNTER — OFFICE VISIT (OUTPATIENT)
Dept: ENDOCRINOLOGY | Facility: CLINIC | Age: 57
End: 2022-03-25

## 2022-03-25 VITALS
HEIGHT: 66 IN | WEIGHT: 272 LBS | DIASTOLIC BLOOD PRESSURE: 76 MMHG | HEART RATE: 78 BPM | SYSTOLIC BLOOD PRESSURE: 130 MMHG | BODY MASS INDEX: 43.71 KG/M2 | OXYGEN SATURATION: 95 %

## 2022-03-25 DIAGNOSIS — E11.65 UNCONTROLLED TYPE 2 DIABETES MELLITUS WITH HYPERGLYCEMIA: ICD-10-CM

## 2022-03-25 DIAGNOSIS — E11.65 TYPE 2 DIABETES MELLITUS WITH HYPERGLYCEMIA, WITH LONG-TERM CURRENT USE OF INSULIN: ICD-10-CM

## 2022-03-25 DIAGNOSIS — E11.65 UNCONTROLLED TYPE 2 DIABETES MELLITUS WITH HYPERGLYCEMIA: Primary | ICD-10-CM

## 2022-03-25 DIAGNOSIS — E11.49 DM (DIABETES MELLITUS), TYPE 2 WITH NEUROLOGICAL COMPLICATIONS: ICD-10-CM

## 2022-03-25 DIAGNOSIS — Z79.4 TYPE 2 DIABETES MELLITUS WITH HYPERGLYCEMIA, WITH LONG-TERM CURRENT USE OF INSULIN: ICD-10-CM

## 2022-03-25 DIAGNOSIS — I10 ESSENTIAL HYPERTENSION: Chronic | ICD-10-CM

## 2022-03-25 DIAGNOSIS — E89.0 POSTOPERATIVE HYPOTHYROIDISM: ICD-10-CM

## 2022-03-25 DIAGNOSIS — E78.5 HYPERLIPIDEMIA LDL GOAL <70: Chronic | ICD-10-CM

## 2022-03-25 LAB
ALBUMIN SERPL-MCNC: 4 G/DL (ref 3.5–5.2)
ALBUMIN UR-MCNC: <1.2 MG/DL
ALBUMIN/GLOB SERPL: 1.1 G/DL
ALP SERPL-CCNC: 126 U/L (ref 39–117)
ALT SERPL W P-5'-P-CCNC: 26 U/L (ref 1–33)
ANION GAP SERPL CALCULATED.3IONS-SCNC: 9.8 MMOL/L (ref 5–15)
AST SERPL-CCNC: 21 U/L (ref 1–32)
BILIRUB SERPL-MCNC: 0.4 MG/DL (ref 0–1.2)
BUN SERPL-MCNC: 7 MG/DL (ref 6–20)
BUN/CREAT SERPL: 10.9 (ref 7–25)
CALCIUM SPEC-SCNC: 9.4 MG/DL (ref 8.6–10.5)
CHLORIDE SERPL-SCNC: 100 MMOL/L (ref 98–107)
CHOLEST SERPL-MCNC: 193 MG/DL (ref 0–200)
CO2 SERPL-SCNC: 28.2 MMOL/L (ref 22–29)
CREAT SERPL-MCNC: 0.64 MG/DL (ref 0.57–1)
CREAT UR-MCNC: 105.6 MG/DL
EGFRCR SERPLBLD CKD-EPI 2021: 102.6 ML/MIN/1.73
EXPIRATION DATE: NORMAL
EXPIRATION DATE: NORMAL
GLOBULIN UR ELPH-MCNC: 3.5 GM/DL
GLUCOSE BLDC GLUCOMTR-MCNC: 116 MG/DL (ref 70–130)
GLUCOSE SERPL-MCNC: 102 MG/DL (ref 65–99)
HBA1C MFR BLD: 9.8 %
HDLC SERPL-MCNC: 52 MG/DL (ref 40–60)
LDLC SERPL CALC-MCNC: 120 MG/DL (ref 0–100)
LDLC/HDLC SERPL: 2.26 {RATIO}
Lab: NORMAL
Lab: NORMAL
MICROALBUMIN/CREAT UR: NORMAL MG/G{CREAT}
POTASSIUM SERPL-SCNC: 4.1 MMOL/L (ref 3.5–5.2)
PROT SERPL-MCNC: 7.5 G/DL (ref 6–8.5)
SODIUM SERPL-SCNC: 138 MMOL/L (ref 136–145)
TRIGL SERPL-MCNC: 117 MG/DL (ref 0–150)
TSH SERPL DL<=0.05 MIU/L-ACNC: 3.53 UIU/ML (ref 0.27–4.2)
VLDLC SERPL-MCNC: 21 MG/DL (ref 5–40)

## 2022-03-25 PROCEDURE — 83036 HEMOGLOBIN GLYCOSYLATED A1C: CPT | Performed by: INTERNAL MEDICINE

## 2022-03-25 PROCEDURE — 82570 ASSAY OF URINE CREATININE: CPT

## 2022-03-25 PROCEDURE — 82947 ASSAY GLUCOSE BLOOD QUANT: CPT | Performed by: INTERNAL MEDICINE

## 2022-03-25 PROCEDURE — 82043 UR ALBUMIN QUANTITATIVE: CPT

## 2022-03-25 PROCEDURE — 80061 LIPID PANEL: CPT

## 2022-03-25 PROCEDURE — 99214 OFFICE O/P EST MOD 30 MIN: CPT | Performed by: INTERNAL MEDICINE

## 2022-03-25 PROCEDURE — 84443 ASSAY THYROID STIM HORMONE: CPT

## 2022-03-25 PROCEDURE — 80053 COMPREHEN METABOLIC PANEL: CPT

## 2022-03-25 RX ORDER — PEN NEEDLE, DIABETIC 31 GX5/16"
NEEDLE, DISPOSABLE MISCELLANEOUS
Qty: 300 EACH | Refills: 3 | Status: SHIPPED | OUTPATIENT
Start: 2022-03-25

## 2022-03-25 RX ORDER — GABAPENTIN 300 MG/1
300 CAPSULE ORAL 2 TIMES DAILY
Qty: 180 CAPSULE | Refills: 1 | Status: SHIPPED | OUTPATIENT
Start: 2022-03-25 | End: 2022-07-06

## 2022-03-25 RX ORDER — HYDROCODONE BITARTRATE AND ACETAMINOPHEN 7.5; 325 MG/1; MG/1
TABLET ORAL AS NEEDED
COMMUNITY
End: 2022-12-08

## 2022-03-25 NOTE — PROGRESS NOTES
"     Office Note      Date: 2022  Patient Name: Ivette Khalil  MRN: [id number]  : 1965    Chief Complaint   Patient presents with   • Diabetes       History of Present Illness:   Ivette Khalil is a 56 y.o. female who presents for Diabetes type 2. Diagnosed in: . Treated in past with oral agents. She didn't tolerate farxiga due to yeast infections.  Current treatments: ozempic and basal bolus insulin. Number of insulin shots per day: 4. Checks blood sugar two times a day. Has low blood sugar: no. Aspirin use: No -  PUD. Statin use: No - intolerant. ACE-I/ARB use: yes. Changes in health since last visit: abd wall hernia - planning for surgery. Last eye exam .     At the last visit we prescribed gabapentin for neuropathy.  It has helped but not very much.  She didn't respond to duloxetine or amitriptyline.     She had thyroidectomy in  for benign goiter.  She is on T4 200mcg qd.  She is taking this correctly.  She isn't taking any interfering meds concurrently.  She hasn't noted any change in the size of her neck.  She denies any compressive sxs.  She denies any sxs of hypo- or hyperthyroidism at this time.     Subjective      Diabetic Complications:  Eyes: No  Kidneys: No  Feet: burning in feet  Heart: Yes -      Diet and Exercise:  Meals per day: 3  Minutes of exercise per week: 0 mins.    Review of Systems:   Review of Systems   Constitutional: Negative.    Cardiovascular: Negative.    Gastrointestinal: Negative.    Endocrine: Negative.        The following portions of the patient's history were reviewed and updated as appropriate: allergies, current medications, past family history, past medical history, past social history, past surgical history and problem list.    Objective       Visit Vitals  /76   Pulse 78   Ht 167.6 cm (66\")   Wt 123 kg (272 lb)   SpO2 95%   BMI 43.90 kg/m²       Physical Exam:  Physical Exam  Constitutional:       Appearance: Normal appearance. "   Cardiovascular:      Pulses:           Dorsalis pedis pulses are 2+ on the right side and 2+ on the left side.        Posterior tibial pulses are 2+ on the right side and 2+ on the left side.   Feet:      Right foot:      Protective Sensation: 5 sites tested. 5 sites sensed.      Skin integrity: Callus present.      Toenail Condition: Right toenails are normal.      Left foot:      Protective Sensation: 5 sites tested. 5 sites sensed.      Skin integrity: Callus present.      Toenail Condition: Left toenails are normal.      Comments: Callus on medial aspect of both 1st toes  Neurological:      Mental Status: She is alert.         Labs:    HbA1c  Lab Results   Component Value Date    HGBA1C 9.8 03/25/2022       CMP  Lab Results   Component Value Date    GLUCOSE 237 (H) 09/10/2020    BUN 11 09/10/2020    CREATININE 0.59 09/10/2020    EGFRIFNONA 105 09/10/2020    BCR 18.6 09/10/2020    K 4.4 09/10/2020    CO2 27.6 09/10/2020    CALCIUM 8.7 09/10/2020    LABIL2 1.1 (L) 11/04/2015    AST 19 09/10/2020    ALT 23 09/10/2020        Lipid Panel  Lab Results   Component Value Date    CHLPL 213 (H) 10/30/2014    HDL 44 09/10/2020     (H) 09/10/2020    TRIG 161 (H) 09/10/2020        TSH  Lab Results   Component Value Date    TSH 3.100 02/08/2021    FREET4 1.55 12/31/2019        Hemoglobin A1C  Lab Results   Component Value Date    HGBA1C 9.8 03/25/2022        Microalbumin/Creatinine  Lab Results   Component Value Date    MALBCRERATIO  09/10/2020      Comment:      Unable to calculate    MICROALBUR <1.2 09/10/2020           Assessment / Plan      Assessment & Plan:  Diagnoses and all orders for this visit:    1. Uncontrolled type 2 diabetes mellitus with hyperglycemia (HCC) (Primary)  Assessment & Plan:  Diabetes is improving with treatment. A1c down from 12% to 9.8%.  Continue current treatment regimen.  Work on diet/exercise/weight loss.  She is considering bariatric surgery.    Diabetes will be reassessed in 3  months.    Orders:  -     POC Glycosylated Hemoglobin (Hb A1C)  -     POC Glucose, Blood  -     Comprehensive Metabolic Panel; Future  -     Lipid Panel; Future  -     Microalbumin / Creatinine Urine Ratio - Urine, Clean Catch; Future  -     TSH; Future    2. Essential hypertension  Assessment & Plan:  Hypertension is improving with treatment.  Continue current treatment regimen.  Blood pressure will be reassessed at the next regular appointment.      3. Hyperlipidemia LDL goal <70  Assessment & Plan:  Statin intolerant.  Check lipids today.      4. Postoperative hypothyroidism  Assessment & Plan:  Continue T4.  Check TSH.      5. Type 2 diabetes mellitus with hyperglycemia, with long-term current use of insulin (HCC)    6. DM (diabetes mellitus), type 2 with neurological complications (HCC)  Assessment & Plan:  Still having neuropathy pain.  Titrate up gabapentin.    Orders:  -     gabapentin (NEURONTIN) 300 MG capsule; Take 1 capsule by mouth 2 (Two) Times a Day.  Dispense: 180 capsule; Refill: 1    Other orders  -     Comfort EZ Pen Needles 31G X 5 MM misc; Use TID  Dispense: 300 each; Refill: 3      Return in about 3 months (around 6/25/2022) for Recheck with A1c.    Kristian Bishop MD   03/25/2022

## 2022-03-25 NOTE — ASSESSMENT & PLAN NOTE
Diabetes is improving with treatment. A1c down from 12% to 9.8%.  Continue current treatment regimen.  Work on diet/exercise/weight loss.  She is considering bariatric surgery.    Diabetes will be reassessed in 3 months.

## 2022-03-27 RX ORDER — EZETIMIBE 10 MG/1
10 TABLET ORAL DAILY
Qty: 90 TABLET | Refills: 3 | Status: SHIPPED | OUTPATIENT
Start: 2022-03-27 | End: 2022-12-08

## 2022-04-04 ENCOUNTER — TELEPHONE (OUTPATIENT)
Dept: ENDOCRINOLOGY | Facility: CLINIC | Age: 57
End: 2022-04-04

## 2022-04-12 ENCOUNTER — TELEPHONE (OUTPATIENT)
Dept: ENDOCRINOLOGY | Facility: CLINIC | Age: 57
End: 2022-04-12

## 2022-04-12 NOTE — TELEPHONE ENCOUNTER
Pt. Walked in to office today to  pt. Assistance medication and requested someone look at her toe, left great toe noted to have bruising under to toenail and at the base of the toenail, no redness or swelling noted. Pt. States she has no pain and there has been no drainage noted but that she had walked about 5 miles per day for 6 days while in [place]. Dr. Bishop brought into exam room to look at the toe also, he advised pt. That there was no infection and that she may lose her toenail from the looks of the bruising. Pt. Voiced understanding. Advised to contact us if there are any changes noted. HAYDEE

## 2022-07-06 ENCOUNTER — OFFICE VISIT (OUTPATIENT)
Dept: ENDOCRINOLOGY | Facility: CLINIC | Age: 57
End: 2022-07-06

## 2022-07-06 VITALS
HEIGHT: 66 IN | SYSTOLIC BLOOD PRESSURE: 120 MMHG | BODY MASS INDEX: 43.07 KG/M2 | WEIGHT: 268 LBS | DIASTOLIC BLOOD PRESSURE: 73 MMHG | HEART RATE: 78 BPM | OXYGEN SATURATION: 99 %

## 2022-07-06 DIAGNOSIS — E89.0 POSTOPERATIVE HYPOTHYROIDISM: ICD-10-CM

## 2022-07-06 DIAGNOSIS — I10 ESSENTIAL HYPERTENSION: Chronic | ICD-10-CM

## 2022-07-06 DIAGNOSIS — Z79.4 TYPE 2 DIABETES MELLITUS WITHOUT COMPLICATION, WITH LONG-TERM CURRENT USE OF INSULIN: ICD-10-CM

## 2022-07-06 DIAGNOSIS — E78.5 HYPERLIPIDEMIA LDL GOAL <70: Chronic | ICD-10-CM

## 2022-07-06 DIAGNOSIS — E11.65 UNCONTROLLED TYPE 2 DIABETES MELLITUS WITH HYPERGLYCEMIA: Primary | ICD-10-CM

## 2022-07-06 DIAGNOSIS — E11.49 DM (DIABETES MELLITUS), TYPE 2 WITH NEUROLOGICAL COMPLICATIONS: ICD-10-CM

## 2022-07-06 DIAGNOSIS — E11.9 TYPE 2 DIABETES MELLITUS WITHOUT COMPLICATION, WITH LONG-TERM CURRENT USE OF INSULIN: ICD-10-CM

## 2022-07-06 DIAGNOSIS — Z79.4 INSULIN LONG-TERM USE: ICD-10-CM

## 2022-07-06 LAB
EXPIRATION DATE: ABNORMAL
EXPIRATION DATE: NORMAL
GLUCOSE BLDC GLUCOMTR-MCNC: 263 MG/DL (ref 70–130)
HBA1C MFR BLD: 11.6 %
Lab: ABNORMAL
Lab: NORMAL

## 2022-07-06 PROCEDURE — 83036 HEMOGLOBIN GLYCOSYLATED A1C: CPT | Performed by: INTERNAL MEDICINE

## 2022-07-06 PROCEDURE — 82947 ASSAY GLUCOSE BLOOD QUANT: CPT | Performed by: INTERNAL MEDICINE

## 2022-07-06 PROCEDURE — 99214 OFFICE O/P EST MOD 30 MIN: CPT | Performed by: INTERNAL MEDICINE

## 2022-07-06 RX ORDER — GABAPENTIN 300 MG/1
300 CAPSULE ORAL 3 TIMES DAILY
Qty: 270 CAPSULE | Refills: 3 | Status: SHIPPED | OUTPATIENT
Start: 2022-07-06 | End: 2022-12-08 | Stop reason: DRUGHIGH

## 2022-07-06 RX ORDER — ESCITALOPRAM OXALATE 10 MG/1
10 TABLET ORAL DAILY
COMMUNITY
Start: 2022-06-17

## 2022-07-06 RX ORDER — SEMAGLUTIDE 2.68 MG/ML
2 INJECTION, SOLUTION SUBCUTANEOUS
Start: 2022-07-06

## 2022-07-06 RX ORDER — LOSARTAN POTASSIUM 50 MG/1
TABLET ORAL
COMMUNITY
Start: 2022-05-19

## 2022-07-06 NOTE — PROGRESS NOTES
"     Office Note      Date: 2022  Patient Name: Ivette Khalil  MRN: [id number]  : 1965    Chief Complaint   Patient presents with   • Diabetes       History of Present Illness:   Ivette Khalil is a 56 y.o. female who presents for Diabetes type 2. Diagnosed in: . Treated in past with oral agents. She didn't tolerate farxiga due to yeast infections.  Current treatments: ozempic and basal bolus insulin. Number of insulin shots per day: 4. Checks blood sugar two times a day. Has low blood sugar: no. Aspirin use: No -  PUD. Statin use: No - intolerant. ACE-I/ARB use: yes. Changes in health since last visit: abd wall hernia - needs to lose weight before surgery. Last eye exam .     At the last visit we increased gabapentin for neuropathy.  It has helped but not very much.  She didn't respond to duloxetine or amitriptyline.     She had thyroidectomy in 2019 for benign goiter.  She is on T4 200mcg qd.  She is taking this correctly.  She isn't taking any interfering meds concurrently.  She hasn't noted any change in the size of her neck.  She denies any compressive sxs.  She denies any sxs of hypo- or hyperthyroidism at this time.     Subjective      Diabetic Complications:  Eyes: No  Kidneys: No  Feet: burning in feet  Heart: Yes -      Diet and Exercise:  Meals per day: 3  Minutes of exercise per week: 0 mins.    Review of Systems:   Review of Systems   Constitutional: Negative.    Cardiovascular: Negative.    Gastrointestinal: Negative.    Endocrine: Negative.        The following portions of the patient's history were reviewed and updated as appropriate: allergies, current medications, past family history, past medical history, past social history, past surgical history and problem list.    Objective       Visit Vitals  /73   Pulse 78   Ht 167.6 cm (66\")   Wt 122 kg (268 lb)   SpO2 99%   BMI 43.26 kg/m²       Physical Exam:  Physical Exam  Constitutional:       Appearance: Normal " appearance.   Neurological:      Mental Status: She is alert.         Labs:    HbA1c  Lab Results   Component Value Date    HGBA1C 11.6 07/06/2022       CMP  Lab Results   Component Value Date    GLUCOSE 102 (H) 03/25/2022    BUN 7 03/25/2022    CREATININE 0.64 03/25/2022    EGFRIFNONA 105 09/10/2020    BCR 10.9 03/25/2022    K 4.1 03/25/2022    CO2 28.2 03/25/2022    CALCIUM 9.4 03/25/2022    LABIL2 1.1 (L) 11/04/2015    AST 21 03/25/2022    ALT 26 03/25/2022        Lipid Panel  Lab Results   Component Value Date    CHLPL 213 (H) 10/30/2014    HDL 52 03/25/2022     (H) 03/25/2022    TRIG 117 03/25/2022        TSH  Lab Results   Component Value Date    TSH 3.530 03/25/2022    FREET4 1.55 12/31/2019        Hemoglobin A1C  Lab Results   Component Value Date    HGBA1C 11.6 07/06/2022        Microalbumin/Creatinine  Lab Results   Component Value Date    MALBCRERATIO  03/25/2022      Comment:      Unable to calculate    MICROALBUR <1.2 03/25/2022           Assessment / Plan      Assessment & Plan:  Diagnoses and all orders for this visit:    1. Uncontrolled type 2 diabetes mellitus with hyperglycemia (HCC) (Primary)  Assessment & Plan:  Diabetes is worsening.   Increase ozempic.  Diabetes will be reassessed in 3 months.    Orders:  -     POC Glucose, Blood  -     POC Glycosylated Hemoglobin (Hb A1C)    2. Essential hypertension  Assessment & Plan:  Hypertension is improving with treatment.  Continue current treatment regimen.  Blood pressure will be reassessed at the next regular appointment.      3. Hyperlipidemia LDL goal <70  Assessment & Plan:  Statin intolerance.  She will start zetia.      4. Postoperative hypothyroidism  Assessment & Plan:  Continue T4.  TSH okay last visit.      5. Insulin long-term use (HCC)    6. DM (diabetes mellitus), type 2 with neurological complications (HCC)  Assessment & Plan:  Still having neuropathy pain.  Increase gabapentin.    Orders:  -     gabapentin (NEURONTIN) 300 MG  capsule; Take 1 capsule by mouth 3 (Three) Times a Day.  Dispense: 270 capsule; Refill: 3    7. Type 2 diabetes mellitus without complication, with long-term current use of insulin (HCC)    Other orders  -     Semaglutide, 2 MG/DOSE, (Ozempic, 2 MG/DOSE,) 8 MG/3ML solution pen-injector; Inject 2 mg under the skin into the appropriate area as directed Every 7 (Seven) Days.      Return in about 3 months (around 10/6/2022) for Recheck with A1c, lipids, TSH.    Kristian Bishop MD   07/06/2022

## 2022-07-29 ENCOUNTER — TELEPHONE (OUTPATIENT)
Dept: ENDOCRINOLOGY | Facility: CLINIC | Age: 57
End: 2022-07-29

## 2022-08-10 RX ORDER — LEVOTHYROXINE SODIUM 0.2 MG/1
TABLET ORAL
Qty: 30 TABLET | Refills: 0 | Status: SHIPPED | OUTPATIENT
Start: 2022-08-10 | End: 2022-08-25

## 2022-08-25 RX ORDER — LEVOTHYROXINE SODIUM 0.2 MG/1
TABLET ORAL
Qty: 30 TABLET | Refills: 4 | Status: SHIPPED | OUTPATIENT
Start: 2022-08-25 | End: 2023-03-06

## 2022-09-29 ENCOUNTER — HOSPITAL ENCOUNTER (OUTPATIENT)
Dept: GENERAL RADIOLOGY | Facility: HOSPITAL | Age: 57
Discharge: HOME | End: 2022-09-29

## 2022-09-29 ENCOUNTER — TRANSCRIBE ORDERS (OUTPATIENT)
Dept: LAB | Facility: HOSPITAL | Age: 57
End: 2022-09-29

## 2022-09-29 DIAGNOSIS — R05.9 COUGH: ICD-10-CM

## 2022-09-29 DIAGNOSIS — J40 BRONCHITIS: ICD-10-CM

## 2022-09-29 DIAGNOSIS — J40 BRONCHITIS: Primary | ICD-10-CM

## 2022-09-29 PROCEDURE — 71046 X-RAY EXAM CHEST 2 VIEWS: CPT | Performed by: RADIOLOGY

## 2022-09-29 PROCEDURE — 71046 X-RAY EXAM CHEST 2 VIEWS: CPT

## 2022-12-08 ENCOUNTER — TELEPHONE (OUTPATIENT)
Dept: ENDOCRINOLOGY | Facility: CLINIC | Age: 57
End: 2022-12-08

## 2022-12-08 ENCOUNTER — OFFICE VISIT (OUTPATIENT)
Dept: ENDOCRINOLOGY | Facility: CLINIC | Age: 57
End: 2022-12-08

## 2022-12-08 ENCOUNTER — LAB (OUTPATIENT)
Dept: LAB | Facility: HOSPITAL | Age: 57
End: 2022-12-08

## 2022-12-08 VITALS
OXYGEN SATURATION: 95 % | HEART RATE: 93 BPM | DIASTOLIC BLOOD PRESSURE: 84 MMHG | BODY MASS INDEX: 43.39 KG/M2 | HEIGHT: 66 IN | WEIGHT: 270 LBS | SYSTOLIC BLOOD PRESSURE: 136 MMHG

## 2022-12-08 DIAGNOSIS — Z79.4 TYPE 2 DIABETES MELLITUS WITH HYPERGLYCEMIA, WITH LONG-TERM CURRENT USE OF INSULIN: ICD-10-CM

## 2022-12-08 DIAGNOSIS — E89.0 POSTOPERATIVE HYPOTHYROIDISM: ICD-10-CM

## 2022-12-08 DIAGNOSIS — E78.5 HYPERLIPIDEMIA LDL GOAL <70: Chronic | ICD-10-CM

## 2022-12-08 DIAGNOSIS — E11.65 UNCONTROLLED TYPE 2 DIABETES MELLITUS WITH HYPERGLYCEMIA: Primary | ICD-10-CM

## 2022-12-08 DIAGNOSIS — E66.01 CLASS 3 SEVERE OBESITY DUE TO EXCESS CALORIES WITHOUT SERIOUS COMORBIDITY WITH BODY MASS INDEX (BMI) OF 40.0 TO 44.9 IN ADULT: ICD-10-CM

## 2022-12-08 DIAGNOSIS — I10 ESSENTIAL HYPERTENSION: Chronic | ICD-10-CM

## 2022-12-08 DIAGNOSIS — E11.49 DM (DIABETES MELLITUS), TYPE 2 WITH NEUROLOGICAL COMPLICATIONS: ICD-10-CM

## 2022-12-08 DIAGNOSIS — E11.65 TYPE 2 DIABETES MELLITUS WITH HYPERGLYCEMIA, WITH LONG-TERM CURRENT USE OF INSULIN: ICD-10-CM

## 2022-12-08 PROBLEM — E66.813 CLASS 3 SEVERE OBESITY DUE TO EXCESS CALORIES WITHOUT SERIOUS COMORBIDITY WITH BODY MASS INDEX (BMI) OF 40.0 TO 44.9 IN ADULT: Status: ACTIVE | Noted: 2022-12-08

## 2022-12-08 LAB
EXPIRATION DATE: ABNORMAL
EXPIRATION DATE: NORMAL
GLUCOSE BLDC GLUCOMTR-MCNC: 194 MG/DL (ref 70–130)
HBA1C MFR BLD: 8.7 %
Lab: ABNORMAL
Lab: NORMAL
TSH SERPL DL<=0.05 MIU/L-ACNC: 0.33 UIU/ML (ref 0.27–4.2)

## 2022-12-08 PROCEDURE — 84443 ASSAY THYROID STIM HORMONE: CPT

## 2022-12-08 PROCEDURE — 83036 HEMOGLOBIN GLYCOSYLATED A1C: CPT | Performed by: INTERNAL MEDICINE

## 2022-12-08 PROCEDURE — 82947 ASSAY GLUCOSE BLOOD QUANT: CPT | Performed by: INTERNAL MEDICINE

## 2022-12-08 PROCEDURE — 99214 OFFICE O/P EST MOD 30 MIN: CPT | Performed by: INTERNAL MEDICINE

## 2022-12-08 RX ORDER — GABAPENTIN 400 MG/1
400 CAPSULE ORAL 3 TIMES DAILY
Qty: 270 CAPSULE | Refills: 1 | Status: SHIPPED | OUTPATIENT
Start: 2022-12-08

## 2022-12-08 NOTE — ASSESSMENT & PLAN NOTE
Diabetes is improving with treatment.   Continue current treatment regimen.  Work on weight loss.  Refer to weight management clinic.  Diabetes will be reassessed in 3 months.

## 2022-12-08 NOTE — PROGRESS NOTES
"     Office Note      Date: 2022  Patient Name: Ivette Khalil  MRN: [id number]  : 1965    Chief Complaint   Patient presents with   • Diabetes       History of Present Illness:   Ivette Khalil is a 57 y.o. female who presents for Diabetes type 2. Diagnosed in: . Treated in past with oral agents. She didn't tolerate farxiga due to yeast infections.  Current treatments: ozempic and basal bolus insulin. Number of insulin shots per day: 4. Checks blood sugar two times a day. Has low blood sugar: no. Aspirin use: No -  PUD. Statin use: No - intolerant - also stopped zetia due to cramps. ACE-I/ARB use: yes. Changes in health since last visit: none. Last eye exam .     At the last visit we increased gabapentin again for neuropathy.  It has helped but not very much.  She didn't respond to duloxetine or amitriptyline.     She had thyroidectomy in 2019 for benign goiter.  She is on T4 200mcg qd.  She is taking this correctly.  She isn't taking any interfering meds concurrently.  She hasn't noted any change in the size of her neck.  She denies any compressive sxs.  She denies any sxs of hypo- or hyperthyroidism at this time.     Subjective      Diabetic Complications:  Eyes: No  Kidneys: No  Feet: burning in feet  Heart: Yes -     Diet and Exercise:  Meals per day: 3  Minutes of exercise per week: 0 mins.    Review of Systems:   Review of Systems   Constitutional: Negative.    Cardiovascular: Negative.    Gastrointestinal: Negative.    Endocrine: Negative.        The following portions of the patient's history were reviewed and updated as appropriate: allergies, current medications, past family history, past medical history, past social history, past surgical history and problem list.    Objective       Visit Vitals  /84   Pulse 93   Ht 167.6 cm (66\")   Wt 122 kg (270 lb)   SpO2 95%   BMI 43.58 kg/m²       Physical Exam:  Physical Exam  Constitutional:       Appearance: Normal appearance. " She is obese.   Neurological:      Mental Status: She is alert.         Labs:    HbA1c  Lab Results   Component Value Date    HGBA1C 8.7 12/08/2022       CMP  Lab Results   Component Value Date    GLUCOSE 102 (H) 03/25/2022    BUN 7 03/25/2022    CREATININE 0.64 03/25/2022    EGFRIFNONA 105 09/10/2020    BCR 10.9 03/25/2022    K 4.1 03/25/2022    CO2 28.2 03/25/2022    CALCIUM 9.4 03/25/2022    LABIL2 1.1 (L) 11/04/2015    AST 21 03/25/2022    ALT 26 03/25/2022        Lipid Panel  Lab Results   Component Value Date    CHLPL 213 (H) 10/30/2014    HDL 52 03/25/2022     (H) 03/25/2022    TRIG 117 03/25/2022        TSH  Lab Results   Component Value Date    TSH 3.530 03/25/2022    FREET4 1.55 12/31/2019        Hemoglobin A1C  Lab Results   Component Value Date    HGBA1C 8.7 12/08/2022        Microalbumin/Creatinine  Lab Results   Component Value Date    MALBCRERATIO  03/25/2022      Comment:      Unable to calculate    MICROALBUR <1.2 03/25/2022           Assessment / Plan      Assessment & Plan:  Diagnoses and all orders for this visit:    1. Uncontrolled type 2 diabetes mellitus with hyperglycemia (HCC) (Primary)  Assessment & Plan:  Diabetes is improving with treatment.   Continue current treatment regimen.  Work on weight loss.  Refer to weight management clinic.  Diabetes will be reassessed in 3 months.    Orders:  -     POC Glucose, Blood  -     POC Glycosylated Hemoglobin (Hb A1C)    2. Essential hypertension  Assessment & Plan:  Hypertension is unchanged.  Continue current treatment regimen.  Blood pressure will be reassessed at the next regular appointment.      3. Hyperlipidemia LDL goal <70  Assessment & Plan:  Intolerant of statin and zetia.    Orders:  -     Cancel: Lipid Panel; Future    4. Postoperative hypothyroidism  Assessment & Plan:  Continue T4.  Check TSH today.    Orders:  -     TSH; Future    5. Type 2 diabetes mellitus with hyperglycemia, with long-term current use of insulin (HCC)    6.  Class 3 severe obesity due to excess calories without serious comorbidity with body mass index (BMI) of 40.0 to 44.9 in adult ([facility])  Assessment & Plan:  We discussed referral to weight management clinic.    Orders:  -     Ambulatory Referral to Weight Management Program    7. DM (diabetes mellitus), type 2 with neurological complications ([facility])  Assessment & Plan:  Increase gabapentin.    Orders:  -     gabapentin (NEURONTIN) 400 MG capsule; Take 1 capsule by mouth 3 (Three) Times a Day.  Dispense: 270 capsule; Refill: 1      Return in about 3 months (around 3/8/2023) for Recheck with A1c.    Kristian Bishop MD   12/08/2022

## 2022-12-08 NOTE — TELEPHONE ENCOUNTER
Spoke with patient.  She states that she gets through patient assistance.  Did not change medication in system.

## 2023-03-06 RX ORDER — LEVOTHYROXINE SODIUM 0.2 MG/1
TABLET ORAL
Qty: 30 TABLET | Refills: 3 | Status: SHIPPED | OUTPATIENT
Start: 2023-03-06

## 2023-04-10 ENCOUNTER — OFFICE VISIT (OUTPATIENT)
Dept: ENDOCRINOLOGY | Facility: CLINIC | Age: 58
End: 2023-04-10
Payer: MEDICARE

## 2023-04-10 VITALS
HEIGHT: 66 IN | HEART RATE: 79 BPM | SYSTOLIC BLOOD PRESSURE: 120 MMHG | OXYGEN SATURATION: 97 % | DIASTOLIC BLOOD PRESSURE: 80 MMHG | WEIGHT: 257 LBS | BODY MASS INDEX: 41.3 KG/M2

## 2023-04-10 DIAGNOSIS — E89.0 POSTOPERATIVE HYPOTHYROIDISM: ICD-10-CM

## 2023-04-10 DIAGNOSIS — E11.65 UNCONTROLLED TYPE 2 DIABETES MELLITUS WITH HYPERGLYCEMIA: Primary | ICD-10-CM

## 2023-04-10 DIAGNOSIS — E11.49 DM (DIABETES MELLITUS), TYPE 2 WITH NEUROLOGICAL COMPLICATIONS: ICD-10-CM

## 2023-04-10 DIAGNOSIS — I10 ESSENTIAL HYPERTENSION: Chronic | ICD-10-CM

## 2023-04-10 DIAGNOSIS — E78.5 HYPERLIPIDEMIA LDL GOAL <70: Chronic | ICD-10-CM

## 2023-04-10 LAB
EXPIRATION DATE: ABNORMAL
EXPIRATION DATE: NORMAL
GLUCOSE BLDC GLUCOMTR-MCNC: 276 MG/DL (ref 70–130)
HBA1C MFR BLD: 8.8 %
Lab: ABNORMAL
Lab: NORMAL

## 2023-04-10 PROCEDURE — 99214 OFFICE O/P EST MOD 30 MIN: CPT | Performed by: INTERNAL MEDICINE

## 2023-04-10 PROCEDURE — 3079F DIAST BP 80-89 MM HG: CPT | Performed by: INTERNAL MEDICINE

## 2023-04-10 PROCEDURE — 1160F RVW MEDS BY RX/DR IN RCRD: CPT | Performed by: INTERNAL MEDICINE

## 2023-04-10 PROCEDURE — 3074F SYST BP LT 130 MM HG: CPT | Performed by: INTERNAL MEDICINE

## 2023-04-10 PROCEDURE — 82947 ASSAY GLUCOSE BLOOD QUANT: CPT | Performed by: INTERNAL MEDICINE

## 2023-04-10 PROCEDURE — 83036 HEMOGLOBIN GLYCOSYLATED A1C: CPT | Performed by: INTERNAL MEDICINE

## 2023-04-10 PROCEDURE — 3052F HG A1C>EQUAL 8.0%<EQUAL 9.0%: CPT | Performed by: INTERNAL MEDICINE

## 2023-04-10 PROCEDURE — 1159F MED LIST DOCD IN RCRD: CPT | Performed by: INTERNAL MEDICINE

## 2023-04-10 NOTE — ASSESSMENT & PLAN NOTE
Diabetes is unchanged. Fasting FSBS above goal.  Titrate up insulin.  Increase basal insulin to fasting FSBS <120.  Diabetes will be reassessed in 3 months.

## 2023-04-10 NOTE — PROGRESS NOTES
"     Office Note      Date: 04/10/2023  Patient Name: Ivette Khalil  MRN: [id number]  : 1965    Chief Complaint   Patient presents with   • Diabetes       History of Present Illness:   Ivette Khalil is a 57 y.o. female who presents for Diabetes type 2. Diagnosed in: . Treated in past with oral agents. She didn't tolerate farxiga due to yeast infections.  Current treatments: ozempic and basal bolus insulin. Number of insulin shots per day: 4. Checks blood sugar two times a day. Has low blood sugar: no. Aspirin use: No -  PUD. Statin use: No - intolerant - also stopped zetia due to cramps. ACE-I/ARB use: yes. Changes in health since last visit: none. Last eye exam spring 2022.     At the last visit we increased gabapentin again for neuropathy.  It has helped.  She didn't respond to duloxetine or amitriptyline.     She had thyroidectomy in  for benign goiter.  She is on T4 200mcg qd.  She is taking this correctly.  She isn't taking any interfering meds concurrently.  She hasn't noted any change in the size of her neck.  She denies any compressive sxs.  She denies any sxs of hypo- or hyperthyroidism at this time.     Subjective      Diabetic Complications:  Eyes: No  Kidneys: No  Feet: burning in feet  Heart: Yes -     Diet and Exercise:  Meals per day: 3  Minutes of exercise per week: 0 mins.    Review of Systems:   Review of Systems   Constitutional: Negative.    Cardiovascular: Negative.    Gastrointestinal: Negative.    Endocrine: Negative.        The following portions of the patient's history were reviewed and updated as appropriate: allergies, current medications, past family history, past medical history, past social history, past surgical history and problem list.    Objective       Visit Vitals  /80   Pulse 79   Ht 167.6 cm (66\")   Wt 117 kg (257 lb)   SpO2 97%   BMI 41.48 kg/m²       Physical Exam:  Physical Exam  Constitutional:       Appearance: Normal appearance. "   Neurological:      Mental Status: She is alert.         Labs:    HbA1c  Lab Results   Component Value Date    HGBA1C 8.8 04/10/2023       CMP  Lab Results   Component Value Date    GLUCOSE 102 (H) 03/25/2022    BUN 7 03/25/2022    CREATININE 0.64 03/25/2022    EGFRIFNONA 105 09/10/2020    BCR 10.9 03/25/2022    K 4.1 03/25/2022    CO2 28.2 03/25/2022    CALCIUM 9.4 03/25/2022    LABIL2 1.1 (L) 11/04/2015    AST 21 03/25/2022    ALT 26 03/25/2022        Lipid Panel  Lab Results   Component Value Date    CHLPL 213 (H) 10/30/2014    HDL 52 03/25/2022     (H) 03/25/2022    TRIG 117 03/25/2022        TSH  Lab Results   Component Value Date    TSH 0.328 12/08/2022    FREET4 1.55 12/31/2019        Hemoglobin A1C  Lab Results   Component Value Date    HGBA1C 8.8 04/10/2023        Microalbumin/Creatinine  Lab Results   Component Value Date    MALBCRERATIO  03/25/2022      Comment:      Unable to calculate    MICROALBUR <1.2 03/25/2022           Assessment / Plan      Assessment & Plan:  Diagnoses and all orders for this visit:    1. Uncontrolled type 2 diabetes mellitus with hyperglycemia (Primary)  Assessment & Plan:  Diabetes is unchanged. Fasting FSBS above goal.  Titrate up insulin.  Increase basal insulin to fasting FSBS <120.  Diabetes will be reassessed in 3 months.    Orders:  -     POC Glucose, Blood  -     POC Glycosylated Hemoglobin (Hb A1C)    2. Essential hypertension  Assessment & Plan:  Hypertension is unchanged.  Continue current treatment regimen.  Blood pressure will be reassessed at the next regular appointment.      3. Hyperlipidemia LDL goal <70  Assessment & Plan:  Plan to check lipids next visit.  Continue weight loss efforts.      4. Postoperative hypothyroidism  Assessment & Plan:  Continue T4 tx.  TSH okay last visit.  Plan to recheck TSH again next visit.      5. DM (diabetes mellitus), type 2 with neurological complications  Assessment & Plan:  Doing okay on current dose of  "gabapentin.  Controlled substance agreement was signed today.      Current Outpatient Medications   Medication Instructions   • Comfort EZ Pen Needles 31G X 5 MM misc Use TID   • escitalopram (LEXAPRO) 10 mg, Oral, Daily, as directed   • gabapentin (NEURONTIN) 400 mg, Oral, 3 Times Daily   • ibuprofen (ADVIL,MOTRIN) 200 mg, Oral, Every 6 Hours PRN   • insulin aspart (NOVOLOG) 25 Units, Subcutaneous, 3 Times Daily   • Insulin Syringe 31G X 5/16\" 0.5 ML misc Use TID   • levothyroxine (SYNTHROID, LEVOTHROID) 200 MCG tablet TAKE ONE TABLET BY MOUTH EVERY MORNING ON AN EMPTY STOMACH FOR THYROID THERAPY   • losartan (COZAAR) 50 MG tablet TAKE 1/2 TABLET BY MOUTH EVERY DAY AS DIRECTED   • metoprolol succinate XL (TOPROL-XL) 50 mg, Oral, Daily   • omeprazole (PRILOSEC) 40 mg, Oral, Daily   • Ozempic (2 MG/DOSE) 2 mg, Subcutaneous, Every 7 Days   • Tresiba 35 Units, Subcutaneous, 2 Times Daily   • True Metrix Blood Glucose Test test strip No dose, route, or frequency recorded.      Return in about 3 months (around 7/10/2023) for Recheck with A1c, CMP, lipid, TSH, microalbumin, foot exam.    Kristian Bishop MD   04/10/2023  "

## 2023-04-28 ENCOUNTER — TELEPHONE (OUTPATIENT)
Dept: ENDOCRINOLOGY | Facility: CLINIC | Age: 58
End: 2023-04-28
Payer: MEDICARE

## 2023-05-01 ENCOUNTER — TELEPHONE (OUTPATIENT)
Dept: ENDOCRINOLOGY | Facility: CLINIC | Age: 58
End: 2023-05-01
Payer: MEDICARE

## 2023-05-01 NOTE — TELEPHONE ENCOUNTER
Spoke with patient.  New application was filled out 03/03/2023.  We only received Novolog and ozempic.  Patient is going to contact [employer].

## 2023-05-01 NOTE — TELEPHONE ENCOUNTER
Patient called office, wanting to know if we had sent in an order for tresiba through patient assitance. She stated she had not heard anything back. She would like a call back. Thank you!

## 2023-08-18 ENCOUNTER — TELEPHONE (OUTPATIENT)
Dept: ENDOCRINOLOGY | Facility: CLINIC | Age: 58
End: 2023-08-18
Payer: MEDICARE

## 2023-09-06 ENCOUNTER — OFFICE VISIT (OUTPATIENT)
Dept: ENDOCRINOLOGY | Facility: CLINIC | Age: 58
End: 2023-09-06
Payer: MEDICARE

## 2023-09-06 VITALS
WEIGHT: 251 LBS | OXYGEN SATURATION: 96 % | HEIGHT: 66 IN | BODY MASS INDEX: 40.34 KG/M2 | SYSTOLIC BLOOD PRESSURE: 118 MMHG | HEART RATE: 62 BPM | DIASTOLIC BLOOD PRESSURE: 77 MMHG

## 2023-09-06 DIAGNOSIS — I10 ESSENTIAL HYPERTENSION: Chronic | ICD-10-CM

## 2023-09-06 DIAGNOSIS — E11.65 UNCONTROLLED TYPE 2 DIABETES MELLITUS WITH HYPERGLYCEMIA: Primary | ICD-10-CM

## 2023-09-06 DIAGNOSIS — E89.0 POSTOPERATIVE HYPOTHYROIDISM: ICD-10-CM

## 2023-09-06 DIAGNOSIS — E78.5 HYPERLIPIDEMIA LDL GOAL <70: Chronic | ICD-10-CM

## 2023-09-06 DIAGNOSIS — E11.49 DM (DIABETES MELLITUS), TYPE 2 WITH NEUROLOGICAL COMPLICATIONS: ICD-10-CM

## 2023-09-06 LAB
CHOLEST SERPL-MCNC: 163 MG/DL (ref 0–200)
EXPIRATION DATE: ABNORMAL
EXPIRATION DATE: NORMAL
GLUCOSE BLDC GLUCOMTR-MCNC: 309 MG/DL (ref 70–130)
HBA1C MFR BLD: 10 %
HDLC SERPL-MCNC: 41 MG/DL (ref 40–60)
LDLC SERPL CALC-MCNC: 93 MG/DL (ref 0–100)
LDLC/HDLC SERPL: 2.15 {RATIO}
Lab: ABNORMAL
Lab: NORMAL
TRIGL SERPL-MCNC: 169 MG/DL (ref 0–150)
TSH SERPL DL<=0.05 MIU/L-ACNC: 0.05 UIU/ML (ref 0.27–4.2)
VLDLC SERPL-MCNC: 29 MG/DL (ref 5–40)

## 2023-09-06 PROCEDURE — 82043 UR ALBUMIN QUANTITATIVE: CPT | Performed by: INTERNAL MEDICINE

## 2023-09-06 PROCEDURE — 84443 ASSAY THYROID STIM HORMONE: CPT | Performed by: INTERNAL MEDICINE

## 2023-09-06 PROCEDURE — 80061 LIPID PANEL: CPT | Performed by: INTERNAL MEDICINE

## 2023-09-06 PROCEDURE — 82570 ASSAY OF URINE CREATININE: CPT | Performed by: INTERNAL MEDICINE

## 2023-09-06 RX ORDER — ONDANSETRON 4 MG/1
4 TABLET, ORALLY DISINTEGRATING ORAL
COMMUNITY
Start: 2023-09-02 | End: 2023-09-09

## 2023-09-06 RX ORDER — OXYCODONE HYDROCHLORIDE AND ACETAMINOPHEN 5; 325 MG/1; MG/1
1 TABLET ORAL
COMMUNITY
Start: 2023-09-02 | End: 2023-09-12

## 2023-09-06 RX ORDER — GABAPENTIN 600 MG/1
600 TABLET ORAL 3 TIMES DAILY
Qty: 270 TABLET | Refills: 1 | Status: SHIPPED | OUTPATIENT
Start: 2023-09-06

## 2023-09-06 RX ORDER — INSULIN DEGLUDEC 100 U/ML
42 INJECTION, SOLUTION SUBCUTANEOUS 2 TIMES DAILY
Start: 2023-09-06

## 2023-09-06 RX ORDER — LEVOTHYROXINE SODIUM 0.2 MG/1
TABLET ORAL
Qty: 30 TABLET | Refills: 3 | Status: SHIPPED | OUTPATIENT
Start: 2023-09-06 | End: 2023-09-07 | Stop reason: DRUGHIGH

## 2023-09-06 NOTE — PROGRESS NOTES
"     Office Note      Date: 2023  Patient Name: Ivette Khalil  MRN: [id number]  : 1965    Chief Complaint   Patient presents with    Diabetes       History of Present Illness:   Ivette Khalil is a 57 y.o. female who presents for Diabetes type 2. Diagnosed in: . Treated in past with oral agents. She didn't tolerate farxiga due to yeast infections.  Current treatments: ozempic and basal bolus insulin. Number of insulin shots per day: 4. Checks blood sugar two times a day. Has low blood sugar: no. Aspirin use: No -  PUD. Statin use: No - intolerant - also stopped zetia due to cramps. ACE-I/ARB use: yes. Changes in health since last visit: abd wall hernia repair. Last eye exam spring 2022.     She remains on gabapentin for neuropathy.  It has helped but still having some burning.  She didn't respond to duloxetine or amitriptyline.     She had thyroidectomy in 2019 for benign goiter.  She is on T4 200mcg qd.  She is taking this correctly.  She isn't taking any interfering meds concurrently.  She hasn't noted any change in the size of her neck.  She denies any compressive sxs.  She denies any sxs of hypo- or hyperthyroidism at this time.     Subjective      Diabetic Complications:  Eyes: No  Kidneys: No  Feet: burning in feet  Heart: Yes -     Diet and Exercise:  Meals per day: 3  Minutes of exercise per week: 0 mins.    Review of Systems:   Review of Systems   Constitutional: Negative.    Cardiovascular: Negative.    Gastrointestinal: Negative.    Endocrine: Negative.      The following portions of the patient's history were reviewed and updated as appropriate: allergies, current medications, past family history, past medical history, past social history, past surgical history, and problem list.    Objective     Visit Vitals  /77   Pulse 62   Ht 167.6 cm (66\")   Wt 114 kg (251 lb)   SpO2 96%   BMI 40.51 kg/m²       Physical Exam:  Physical Exam  Constitutional:       Appearance: Normal " appearance.   Neurological:      Mental Status: She is alert.       Labs:    HbA1c  Lab Results   Component Value Date    HGBA1C 10.0 09/06/2023       CMP  Lab Results   Component Value Date    GLUCOSE 102 (H) 03/25/2022    BUN 7 03/25/2022    CREATININE 0.64 03/25/2022    EGFRIFNONA 105 09/10/2020    BCR 10.9 03/25/2022    K 4.1 03/25/2022    CO2 28.2 03/25/2022    CALCIUM 9.4 03/25/2022    LABIL2 1.1 (L) 11/04/2015    AST 21 03/25/2022    ALT 26 03/25/2022        Lipid Panel  Lab Results   Component Value Date    CHLPL 213 (H) 10/30/2014    HDL 41 09/06/2023    LDL 93 09/06/2023    TRIG 169 (H) 09/06/2023        TSH  Lab Results   Component Value Date    TSH 0.046 (L) 09/06/2023    FREET4 1.55 12/31/2019        Hemoglobin A1C  Lab Results   Component Value Date    HGBA1C 10.0 09/06/2023        Microalbumin/Creatinine  Lab Results   Component Value Date    MALBCRERATIO 9.9 09/06/2023    MICROALBUR 2.2 09/06/2023           Assessment / Plan      Assessment & Plan:  Diagnoses and all orders for this visit:    1. Uncontrolled type 2 diabetes mellitus with hyperglycemia (Primary)  Assessment & Plan:  Diabetes is worsening.   We discussed treatment options today.   Titrate up insulin.  Diabetes will be reassessed in 3 months.    Orders:  -     POC Glucose, Blood  -     POC Glycosylated Hemoglobin (Hb A1C)  -     Lipid Panel; Future  -     Microalbumin / Creatinine Urine Ratio - Urine, Clean Catch; Future  -     TSH; Future  -     Lipid Panel  -     Microalbumin / Creatinine Urine Ratio - Urine, Clean Catch  -     TSH    2. Essential hypertension  Assessment & Plan:  Hypertension is unchanged.  Continue current treatment regimen.  Blood pressure will be reassessed at the next regular appointment.      3. Hyperlipidemia LDL goal <70  Assessment & Plan:  Intolerant of statin and ezetimibe.  Check lipids today.  She wasn't fasting for labs though.  Consider bempedoic acid.      4. Postoperative hypothyroidism  Assessment &  "Plan:  Continue T4 tx.  Check TSH.      5. DM (diabetes mellitus), type 2 with neurological complications  -     gabapentin (NEURONTIN) 600 MG tablet; Take 1 tablet by mouth 3 (Three) Times a Day.  Dispense: 270 tablet; Refill: 1    Other orders  -     Insulin Degludec (Tresiba) 100 UNIT/ML solution injection; Inject 42 Units under the skin into the appropriate area as directed 2 (Two) Times a Day.      Current Outpatient Medications   Medication Instructions    Comfort EZ Pen Needles 31G X 5 MM misc Use TID    escitalopram (LEXAPRO) 10 mg, Oral, Daily, as directed    gabapentin (NEURONTIN) 600 mg, Oral, 3 Times Daily    ibuprofen (ADVIL,MOTRIN) 200 mg, Oral, Every 6 Hours PRN    insulin aspart (NOVOLOG) 25 Units, Subcutaneous, 3 Times Daily    Insulin Degludec (TRESIBA) 42 Units, Subcutaneous, 2 Times Daily    Insulin Syringe 31G X 5/16\" 0.5 ML misc Use TID    levothyroxine (SYNTHROID, LEVOTHROID) 175 mcg, Oral, Daily    losartan (COZAAR) 50 MG tablet TAKE 1/2 TABLET BY MOUTH EVERY DAY AS DIRECTED    metoprolol succinate XL (TOPROL-XL) 50 mg, Oral, Daily    omeprazole (PRILOSEC) 40 mg, Oral, Daily    ondansetron ODT (ZOFRAN-ODT) 4 mg, Oral    oxyCODONE-acetaminophen (PERCOCET) 5-325 MG per tablet 1 tablet, Oral    Ozempic (2 MG/DOSE) 2 mg, Subcutaneous, Every 7 Days    True Metrix Blood Glucose Test test strip No dose, route, or frequency recorded.      Return in about 3 months (around 12/6/2023) for Recheck with A1c, TSH, controlled substance agreement.    Kristian Bishop MD   09/06/2023  "

## 2023-09-06 NOTE — ASSESSMENT & PLAN NOTE
Diabetes is worsening.   We discussed treatment options today.   Titrate up insulin.  Diabetes will be reassessed in 3 months.

## 2023-09-06 NOTE — ASSESSMENT & PLAN NOTE
Intolerant of statin and ezetimibe.  Check lipids today.  She wasn't fasting for labs though.  Consider bempedoic acid.

## 2023-09-06 NOTE — TELEPHONE ENCOUNTER
Rx Refill Note  Requested Prescriptions     Pending Prescriptions Disp Refills    levothyroxine (SYNTHROID, LEVOTHROID) 200 MCG tablet [Pharmacy Med Name: LEVOTHYROXINE SODIUM 200  Tablet] 30 tablet 3     Sig: TAKE ONE TABLET BY MOUTH EVERY MORNING FOR THYROID THERAPY      Last office visit with prescribing clinician: 4/10/23    Next office visit with prescribing clinician: 9/6/23      Linda Elias MA  09/06/23, 11:47 EDT

## 2023-09-07 ENCOUNTER — TELEPHONE (OUTPATIENT)
Dept: ENDOCRINOLOGY | Facility: CLINIC | Age: 58
End: 2023-09-07
Payer: MEDICARE

## 2023-09-07 LAB
ALBUMIN UR-MCNC: 2.2 MG/DL
CREAT UR-MCNC: 221.2 MG/DL
MICROALBUMIN/CREAT UR: 9.9 MG/G

## 2023-09-07 RX ORDER — LEVOTHYROXINE SODIUM 175 UG/1
175 TABLET ORAL DAILY
Qty: 90 TABLET | Refills: 3 | Status: SHIPPED | OUTPATIENT
Start: 2023-09-07

## 2023-09-07 NOTE — TELEPHONE ENCOUNTER
Please call [facility] back  [phone]  they have questions about the gabapentin we sent in rx yesterday for gabapentin 600 #90  She still has gabapentin 400 left do you want them to refill or wait til she runs out of the 400

## 2023-09-29 ENCOUNTER — TELEPHONE (OUTPATIENT)
Dept: CARDIOLOGY | Facility: CLINIC | Age: 58
End: 2023-09-29
Payer: MEDICARE

## 2023-09-29 NOTE — TELEPHONE ENCOUNTER
Caller: Ivette Khalil     Relationship: [unfilled]     Best call back number: [phone]    What is your medical concern? PT STATES SHE HAD VISIT WITH PCP YESTERDAY AND BP /93. PCP SENT TO ER FOR CHF AND BP /95 UPON ARRIVAL. UPON DISCHARGE BP /85. ALL TESTING CAME BACK NORMAL. 2 CARDIAC ENZYME TEST AND EKG. PT WAS AT [facility] IN [place]. WOULD LIKE TO GET IN TO SEE DR. CELAYA REGARDING THESE ISSUES. PT ALSO REPORTS GAINING 7 LBS IN LESS THAN 10 DAYS. PLEASE CALL PT BACK TO ADVISE, THANK YOU

## 2023-10-02 ENCOUNTER — OFFICE VISIT (OUTPATIENT)
Dept: CARDIOLOGY | Facility: CLINIC | Age: 58
End: 2023-10-02
Payer: MEDICARE

## 2023-10-02 VITALS
WEIGHT: 255 LBS | SYSTOLIC BLOOD PRESSURE: 144 MMHG | DIASTOLIC BLOOD PRESSURE: 82 MMHG | HEART RATE: 73 BPM | HEIGHT: 66 IN | OXYGEN SATURATION: 98 % | BODY MASS INDEX: 40.98 KG/M2

## 2023-10-02 DIAGNOSIS — I10 ESSENTIAL HYPERTENSION: Chronic | ICD-10-CM

## 2023-10-02 DIAGNOSIS — R00.2 PALPITATIONS: ICD-10-CM

## 2023-10-02 DIAGNOSIS — E78.5 HYPERLIPIDEMIA LDL GOAL <70: Chronic | ICD-10-CM

## 2023-10-02 DIAGNOSIS — R06.09 DYSPNEA ON EXERTION: ICD-10-CM

## 2023-10-02 DIAGNOSIS — R07.2 PRECORDIAL PAIN: Primary | ICD-10-CM

## 2023-10-02 RX ORDER — HYDROCHLOROTHIAZIDE 12.5 MG/1
12.5 CAPSULE, GELATIN COATED ORAL DAILY
Qty: 90 CAPSULE | Refills: 1 | Status: SHIPPED | OUTPATIENT
Start: 2023-10-02

## 2023-10-02 RX ORDER — LOSARTAN POTASSIUM 50 MG/1
50 TABLET ORAL DAILY
Qty: 90 TABLET | Refills: 1 | Status: SHIPPED | OUTPATIENT
Start: 2023-10-02

## 2023-10-02 NOTE — ASSESSMENT & PLAN NOTE
Hypertension is not well controlled  Increase losartan to 50 mg daily  Add HCTZ 12.5 mg daily  Continue metoprolol succinate 50 mg daily  BMP in 1 week  If stress test and echocardiogram within normal limits would recommend sleep study

## 2023-10-02 NOTE — ASSESSMENT & PLAN NOTE
We will defer monitor for now and focus on controlling blood pressure which could be worsening palpitation

## 2023-10-02 NOTE — PROGRESS NOTES
Cardiology Outpatient Visit      Identification: Ivette Khalil is a 57 y.o. female who resides in [place], [place]    Reason for visit:  Hypertension and Chest Pain (Discomfort)      Subjective      Patient is a 57-year-old female who has not been seen by cardiology since April 2021.  The patient was with her  whom I saw earlier and she reported having issues so I fit her into my clinic.  She actually had an appointment already scheduled with me for next week in [place] but she was requesting to be seen sooner so I accommodated.  When she was last seen over 2 years ago she was seen for palpitations.  She reports having a history of an MI years ago.  She reportedly had a cardiac cath in 2016 that showed normal coronary arteries.  She had echocardiogram at that time that showed normal LVEF and no valvular abnormality.  The patient reports that recently she went to her family care provider with sinus issues and upper respiratory problems.  She was found to be extremely hypertensive at 170 over 90s.  They instructed her to go to the emergency room.  She went to [facility] where she ruled out for acute coronary syndrome with negative EKG and normal high-sensitivity troponins.  Her blood pressure was elevated on arrival they are in the 190s over 100s.  Her blood pressure did come down some to the 170s over 80s to 90s and she was discharged home without any adjustments of her medications.  Of note her proBNP was mildly elevated.  The patient reports feeling poorly for the past 6 months with increased fatigue, chest discomfort and worsening shortness of breath.  She has also been having swelling in her legs and recently gained 7 pounds in a week but has now lost 2 of those pounds back off.  She has a history of type 2 diabetes mellitus that is uncontrolled.  Her hemoglobin A1c was 12 but she does have it down to 10 after being placed on Ozempic.  She is also lost some weight.  She reports she  "is supposed to take cholesterol medicine but has been intolerant to all statins.  Her recent lipid panel was within normal limits however.  Her EKG today shows normal sinus rhythm with no acute ischemic changes.  Patient does have a history of hypothyroidism.  Her blood pressure is elevated today at 144/82.  She does recall several months ago that her PCP decreased her losartan from 50 mg to 25 because her blood pressure was \"120s over 80s\".  The patient wore a monitor back in 2021 for her palpitations and monitors showed brief episode of SVT but otherwise benign and normal sinus rhythm.  She does think her palpitations have worsened as recently.    Review of Systems   Constitutional: Positive for malaise/fatigue.   Eyes:  Negative for vision loss in left eye and vision loss in right eye.   Cardiovascular:  Positive for chest pain, dyspnea on exertion and palpitations. Negative for near-syncope, orthopnea, paroxysmal nocturnal dyspnea and syncope.   Musculoskeletal:  Negative for myalgias.   Neurological:  Negative for brief paralysis, excessive daytime sleepiness, focal weakness, numbness, paresthesias and weakness.   All other systems reviewed and are negative.    Allergies   Allergen Reactions    Atorvastatin Myalgia    Morphine Hives    Prednisone Hives     Also, Swelling and skin redness    Codeine Hives    Penicillins Hives     Does ok with Keflex         Current Outpatient Medications   Medication Instructions    Comfort EZ Pen Needles 31G X 5 MM misc Use TID    escitalopram (LEXAPRO) 10 mg, Daily    gabapentin (NEURONTIN) 600 mg, Oral, 3 Times Daily    hydroCHLOROthiazide (MICROZIDE) 12.5 mg, Oral, Daily    ibuprofen (ADVIL,MOTRIN) 200 mg, Oral, Every 6 Hours PRN    insulin aspart (NOVOLOG) 25 Units, Subcutaneous, 3 Times Daily    Insulin Degludec (TRESIBA) 42 Units, Subcutaneous, 2 Times Daily    Insulin Syringe 31G X 5/16\" 0.5 ML misc Use TID    levothyroxine (SYNTHROID, LEVOTHROID) 175 mcg, Oral, Daily    " "losartan (COZAAR) 50 mg, Oral, Daily    metoprolol succinate XL (TOPROL-XL) 50 mg, Oral, Daily    omeprazole (PRILOSEC) 40 mg, Oral, Daily    Ozempic (2 MG/DOSE) 2 mg, Subcutaneous, Every 7 Days    True Metrix Blood Glucose Test test strip No dose, route, or frequency recorded.         Objective     /82 (BP Location: Right arm, Patient Position: Sitting, Cuff Size: Adult)   Pulse 73   Ht 167.6 cm (66\")   Wt 116 kg (255 lb)   SpO2 98%   BMI 41.16 kg/m²       Constitutional:       General: Awake.   Pulmonary:      Effort: Pulmonary effort is normal.      Breath sounds: Normal breath sounds.   Cardiovascular:      Normal rate. Regular rhythm.      Murmurs: There is no murmur.   Pulses:     Intact distal pulses.   Edema:     Peripheral edema absent.   Skin:     General: Skin is warm and dry.   Neurological:      Mental Status: Alert and oriented to person, place and time.   Psychiatric:         Behavior: Behavior is cooperative.       Result Review  (reviewed with patient):        ECG 12 Lead    Date/Time: 10/2/2023 4:07 PM  Performed by: Cynthia Sharif APRN  Authorized by: Cynthia Sharif APRN   Comparison: compared with previous ECG from 10/24/2019  Comparison to previous ECG: Previous EKG suggested anterior and inferior infarct not present on current EKG  Rhythm: sinus rhythm  BPM: 73    Clinical impression: normal ECG  Comments: QT/QTc 376/414 MS         Lab Results   Component Value Date    GLUCOSE 102 (H) 03/25/2022    BUN 7 03/25/2022    CREATININE 0.64 03/25/2022    EGFR 102.6 03/25/2022    BCR 10.9 03/25/2022    K 4.1 03/25/2022    CO2 28.2 03/25/2022    CALCIUM 9.4 03/25/2022    ALBUMIN 4.00 03/25/2022    BILITOT 0.4 03/25/2022    AST 21 03/25/2022    ALT 26 03/25/2022     Lab Results   Component Value Date    WBC 7.51 09/10/2020    HGB 13.9 09/10/2020    HCT 42.3 09/10/2020    MCV 82.1 09/10/2020     09/10/2020     Lab Results   Component Value Date    CHOL 163 09/06/2023    CHLPL " 213 (H) 10/30/2014    TRIG 169 (H) 09/06/2023    HDL 41 09/06/2023    LDL 93 09/06/2023     Lab Results   Component Value Date    HGBA1C 10.0 09/06/2023           Assessment     Diagnoses and all orders for this visit:    1. Precordial pain (Primary)  Overview:  Cardiac catherization (09/26/2016): Normal coronary arteries.  LVEF 70%  Echocardiogram (09/27/2016): EF 60%. No valvular abnormalities.   Carotid duplex (11/22/2020): <20% LANE and LICA.  Echocardiogram at [facility] (11/22/2020): EF 55%. No valvular abnormalities.     Assessment & Plan:  Does have history of normal coronary arteries but that has been 7 years ago and patient has multiple cardiovascular risk factors including obesity and uncontrolled diabetes.  Symptoms could be anginal.  We will schedule stress testing and echocardiogram    Orders:  -     Stress Test With Myocardial Perfusion (1 Day); Future    2. Essential hypertension  Overview:  Target blood pressure <130/80 mmHg    Assessment & Plan:  Hypertension is not well controlled  Increase losartan to 50 mg daily  Add HCTZ 12.5 mg daily  Continue metoprolol succinate 50 mg daily  BMP in 1 week  If stress test and echocardiogram within normal limits would recommend sleep study    Orders:  -     Basic Metabolic Panel; Future    3. Palpitations  Overview:  Echo at [facility] (11/2020): Normal LVEF.  No significant valve abnormality  Intermittent palpitation symptoms lasting 1 minute in duration, Spring 2021  Normal EKG, 4/2/2021  2 week monitor (2020) NSR with breif SVT, one 6 beat episode VT.  Benign    Assessment & Plan:  We will defer monitor for now and focus on controlling blood pressure which could be worsening palpitation      4. Hyperlipidemia LDL goal <70  Overview:  Statin therapy indicated given diabetic status    Assessment & Plan:  Would recommend statin therapy but patient reports intolerance so will defer      5. Dyspnea on exertion  -     Adult Transthoracic Echo Complete W/ Cont  if Necessary Per Protocol; Future    Other orders  -     hydroCHLOROthiazide (MICROZIDE) 12.5 MG capsule; Take 1 capsule by mouth Daily.  Dispense: 90 capsule; Refill: 1  -     losartan (COZAAR) 50 MG tablet; Take 1 tablet by mouth Daily.  Dispense: 90 tablet; Refill: 1  -     ECG 12 Lead          Plan   Increase losartan 50 mg daily  Add HCTZ 12.5 mg daily  BMP in 1 week  Patient does have history of normal coronary arteries but that has been over 7 years ago and she has multiple cardiovascular risk factors including obesity and uncontrolled diabetes.  Obtain myocardial perfusion study and echocardiogram  If testing within normal limits would recommend sleep study as patient is high risk of obstructive sleep apnea  Discussed starting an SGL 2 inhibitor but patient has tried them before and could not tolerate due to yeast infections  Further recommendations following results of test.  We will contact her when results are available      Follow-up   Return in about 3 months (around 1/2/2024), or if symptoms worsen or fail to improve, for Follow-up with Dr. Dueñas next visit.        Orlando Dueñas MD, FACC, [facility]  10/2/2023

## 2023-10-02 NOTE — ASSESSMENT & PLAN NOTE
Does have history of normal coronary arteries but that has been 7 years ago and patient has multiple cardiovascular risk factors including obesity and uncontrolled diabetes.  Symptoms could be anginal.  We will schedule stress testing and echocardiogram   146

## 2023-10-24 ENCOUNTER — TELEPHONE (OUTPATIENT)
Dept: ENDOCRINOLOGY | Facility: CLINIC | Age: 58
End: 2023-10-24
Payer: MEDICARE

## 2023-10-24 ENCOUNTER — LAB (OUTPATIENT)
Dept: LAB | Facility: HOSPITAL | Age: 58
End: 2023-10-24
Payer: MEDICARE

## 2023-10-24 DIAGNOSIS — I10 ESSENTIAL HYPERTENSION: Chronic | ICD-10-CM

## 2023-10-24 PROCEDURE — 80048 BASIC METABOLIC PNL TOTAL CA: CPT

## 2023-10-25 LAB
ANION GAP SERPL CALCULATED.3IONS-SCNC: 9.4 MMOL/L (ref 5–15)
BUN SERPL-MCNC: 10 MG/DL (ref 8–23)
BUN/CREAT SERPL: 14.3 (ref 7–25)
CALCIUM SPEC-SCNC: 8.9 MG/DL (ref 8.6–10.5)
CHLORIDE SERPL-SCNC: 100 MMOL/L (ref 98–107)
CO2 SERPL-SCNC: 28.6 MMOL/L (ref 22–29)
CREAT SERPL-MCNC: 0.7 MG/DL (ref 0.57–1)
EGFRCR SERPLBLD CKD-EPI 2021: 99.2 ML/MIN/1.73
GLUCOSE SERPL-MCNC: 238 MG/DL (ref 65–99)
POTASSIUM SERPL-SCNC: 4 MMOL/L (ref 3.5–5.2)
SODIUM SERPL-SCNC: 138 MMOL/L (ref 136–145)

## 2023-11-03 ENCOUNTER — HOSPITAL ENCOUNTER (OUTPATIENT)
Dept: CARDIOLOGY | Facility: HOSPITAL | Age: 58
Discharge: HOME | End: 2023-11-03
Payer: MEDICARE

## 2023-11-03 VITALS
HEIGHT: 66 IN | WEIGHT: 255 LBS | BODY MASS INDEX: 40.98 KG/M2 | SYSTOLIC BLOOD PRESSURE: 148 MMHG | DIASTOLIC BLOOD PRESSURE: 92 MMHG

## 2023-11-03 VITALS
BODY MASS INDEX: 41.1 KG/M2 | SYSTOLIC BLOOD PRESSURE: 145 MMHG | HEART RATE: 80 BPM | DIASTOLIC BLOOD PRESSURE: 105 MMHG | HEIGHT: 66 IN | WEIGHT: 255.73 LBS | OXYGEN SATURATION: 95 %

## 2023-11-03 DIAGNOSIS — R07.2 PRECORDIAL PAIN: Primary | ICD-10-CM

## 2023-11-03 DIAGNOSIS — R06.09 DYSPNEA ON EXERTION: ICD-10-CM

## 2023-11-03 PROCEDURE — 93017 CV STRESS TEST TRACING ONLY: CPT

## 2023-11-03 PROCEDURE — 0 TECHNETIUM SESTAMIBI: Performed by: NURSE PRACTITIONER

## 2023-11-03 PROCEDURE — A9500 TC99M SESTAMIBI: HCPCS | Performed by: NURSE PRACTITIONER

## 2023-11-03 PROCEDURE — 25010000002 REGADENOSON 0.4 MG/5ML SOLUTION: Performed by: NURSE PRACTITIONER

## 2023-11-03 PROCEDURE — 93306 TTE W/DOPPLER COMPLETE: CPT

## 2023-11-03 PROCEDURE — 78452 HT MUSCLE IMAGE SPECT MULT: CPT

## 2023-11-03 RX ORDER — REGADENOSON 0.08 MG/ML
0.4 INJECTION, SOLUTION INTRAVENOUS ONCE
Status: COMPLETED | OUTPATIENT
Start: 2023-11-03 | End: 2023-11-03

## 2023-11-03 RX ADMIN — TECHNETIUM TC 99M SESTAMIBI 1 DOSE: 1 INJECTION INTRAVENOUS at 08:20

## 2023-11-03 RX ADMIN — TECHNETIUM TC 99M SESTAMIBI 1 DOSE: 1 INJECTION INTRAVENOUS at 09:50

## 2023-11-03 RX ADMIN — REGADENOSON 0.4 MG: 0.08 INJECTION, SOLUTION INTRAVENOUS at 09:49

## 2023-11-06 LAB
BH CV ECHO MEAS - AO MAX PG: 7.7 MMHG
BH CV ECHO MEAS - AO MEAN PG: 4.5 MMHG
BH CV ECHO MEAS - AO ROOT DIAM: 3 CM
BH CV ECHO MEAS - AO V2 MAX: 139 CM/SEC
BH CV ECHO MEAS - AO V2 VTI: 29.6 CM
BH CV ECHO MEAS - AVA(I,D): 2.14 CM2
BH CV ECHO MEAS - EDV(CUBED): 60.2 ML
BH CV ECHO MEAS - EDV(MOD-SP2): 60 ML
BH CV ECHO MEAS - EDV(MOD-SP4): 74 ML
BH CV ECHO MEAS - EF(MOD-BP): 60 %
BH CV ECHO MEAS - EF(MOD-SP2): 61.7 %
BH CV ECHO MEAS - EF(MOD-SP4): 60.8 %
BH CV ECHO MEAS - ESV(CUBED): 8.5 ML
BH CV ECHO MEAS - ESV(MOD-SP2): 23 ML
BH CV ECHO MEAS - ESV(MOD-SP4): 29 ML
BH CV ECHO MEAS - FS: 48 %
BH CV ECHO MEAS - IVS/LVPW: 1.02 CM
BH CV ECHO MEAS - IVSD: 1.28 CM
BH CV ECHO MEAS - LA DIMENSION: 3.2 CM
BH CV ECHO MEAS - LV DIASTOLIC VOL/BSA (35-75): 33.4 CM2
BH CV ECHO MEAS - LV MASS(C)D: 174.8 GRAMS
BH CV ECHO MEAS - LV MAX PG: 3.8 MMHG
BH CV ECHO MEAS - LV MEAN PG: 2 MMHG
BH CV ECHO MEAS - LV SYSTOLIC VOL/BSA (12-30): 13.1 CM2
BH CV ECHO MEAS - LV V1 MAX: 97 CM/SEC
BH CV ECHO MEAS - LV V1 VTI: 20.1 CM
BH CV ECHO MEAS - LVIDD: 3.9 CM
BH CV ECHO MEAS - LVIDS: 2.04 CM
BH CV ECHO MEAS - LVOT AREA: 3.1 CM2
BH CV ECHO MEAS - LVOT DIAM: 2 CM
BH CV ECHO MEAS - LVPWD: 1.26 CM
BH CV ECHO MEAS - MR MAX PG: 27.9 MMHG
BH CV ECHO MEAS - MR MAX VEL: 264 CM/SEC
BH CV ECHO MEAS - MV A MAX VEL: 64.7 CM/SEC
BH CV ECHO MEAS - MV DEC SLOPE: 571 CM/SEC2
BH CV ECHO MEAS - MV DEC TIME: 0.21 SEC
BH CV ECHO MEAS - MV E MAX VEL: 74 CM/SEC
BH CV ECHO MEAS - MV E/A: 1.14
BH CV ECHO MEAS - MV MAX PG: 3.6 MMHG
BH CV ECHO MEAS - MV MEAN PG: 2 MMHG
BH CV ECHO MEAS - MV P1/2T: 48.9 MSEC
BH CV ECHO MEAS - MV V2 VTI: 25.5 CM
BH CV ECHO MEAS - MVA(P1/2T): 4.5 CM2
BH CV ECHO MEAS - MVA(VTI): 2.48 CM2
BH CV ECHO MEAS - PA ACC SLOPE: 1172 CM/SEC2
BH CV ECHO MEAS - PA ACC TIME: 0.07 SEC
BH CV ECHO MEAS - PA V2 MAX: 113.5 CM/SEC
BH CV ECHO MEAS - PI END-D VEL: 89 CM/SEC
BH CV ECHO MEAS - RAP SYSTOLE: 8 MMHG
BH CV ECHO MEAS - RVSP: 42 MMHG
BH CV ECHO MEAS - SI(MOD-SP2): 16.7 ML/M2
BH CV ECHO MEAS - SI(MOD-SP4): 20.3 ML/M2
BH CV ECHO MEAS - SV(LVOT): 63.1 ML
BH CV ECHO MEAS - SV(MOD-SP2): 37 ML
BH CV ECHO MEAS - SV(MOD-SP4): 45 ML
BH CV ECHO MEAS - TAPSE (>1.6): 2 CM
BH CV ECHO MEAS - TR MAX PG: 34 MMHG
BH CV ECHO MEAS - TR MAX VEL: 231.3 CM/SEC
BH CV REST NUCLEAR ISOTOPE DOSE: 9.2 MCI
BH CV STRESS BP STAGE 2: NORMAL
BH CV STRESS BP STAGE 4: NORMAL
BH CV STRESS COMMENTS STAGE 1: NORMAL
BH CV STRESS DOSE REGADENOSON STAGE 1: 0.4
BH CV STRESS DURATION MIN STAGE 1: 1
BH CV STRESS DURATION MIN STAGE 2: 1
BH CV STRESS DURATION MIN STAGE 3: 1
BH CV STRESS DURATION MIN STAGE 4: 1
BH CV STRESS DURATION SEC STAGE 1: 10
BH CV STRESS DURATION SEC STAGE 2: 0
BH CV STRESS HR STAGE 1: 82
BH CV STRESS HR STAGE 2: 109
BH CV STRESS HR STAGE 3: 100
BH CV STRESS HR STAGE 4: 96
BH CV STRESS NUCLEAR ISOTOPE DOSE: 31.9 MCI
BH CV STRESS O2 STAGE 1: 93
BH CV STRESS O2 STAGE 2: 94
BH CV STRESS O2 STAGE 3: 98
BH CV STRESS O2 STAGE 4: 97
BH CV STRESS PROTOCOL 1: NORMAL
BH CV STRESS RECOVERY BP: NORMAL MMHG
BH CV STRESS RECOVERY HR: 92 BPM
BH CV STRESS RECOVERY O2: 95 %
BH CV STRESS STAGE 1: 1
BH CV STRESS STAGE 2: 2
BH CV STRESS STAGE 3: 3
BH CV STRESS STAGE 4: 4
BH CV XLRA - RV BASE: 2.8 CM
BH CV XLRA - RV LENGTH: 6.9 CM
BH CV XLRA - RV MID: 2.3 CM
LEFT ATRIUM VOLUME INDEX: 22.5 ML/M2
LV EF 2D ECHO EST: 60 %
LV EF NUC BP: 77 %
MAXIMAL PREDICTED HEART RATE: 160 BPM
PERCENT MAX PREDICTED HR: 68.75 %
STRESS BASELINE BP: NORMAL MMHG
STRESS BASELINE HR: 75 BPM
STRESS O2 SAT REST: 95 %
STRESS PERCENT HR: 81 %
STRESS POST ESTIMATED WORKLOAD: 1 METS
STRESS POST EXERCISE DUR MIN: 4 MIN
STRESS POST EXERCISE DUR SEC: 0 SEC
STRESS POST O2 SAT PEAK: 94 %
STRESS POST PEAK BP: NORMAL MMHG
STRESS POST PEAK HR: 110 BPM
STRESS TARGET HR: 136 BPM

## 2023-11-07 ENCOUNTER — TELEPHONE (OUTPATIENT)
Dept: CARDIOLOGY | Facility: CLINIC | Age: 58
End: 2023-11-07
Payer: MEDICARE

## 2023-11-07 DIAGNOSIS — R94.39 ABNORMAL NUCLEAR STRESS TEST: ICD-10-CM

## 2023-11-07 DIAGNOSIS — R07.2 PRECORDIAL PAIN: Primary | ICD-10-CM

## 2023-11-07 NOTE — TELEPHONE ENCOUNTER
Spoke with the patient and her . She is already on metoprolol 50 mg daily. She will take double prior to CT.

## 2023-11-07 NOTE — TELEPHONE ENCOUNTER
----- Message from Miles Dueñas IV, MD sent at 11/6/2023  4:47 PM EST -----  CT angiogram with metoprolol prior to study

## 2023-11-20 ENCOUNTER — TELEPHONE (OUTPATIENT)
Dept: ENDOCRINOLOGY | Facility: CLINIC | Age: 58
End: 2023-11-20
Payer: MEDICARE

## 2023-11-29 ENCOUNTER — TELEPHONE (OUTPATIENT)
Dept: INFUSION THERAPY | Facility: HOSPITAL | Age: 58
End: 2023-11-29
Payer: MEDICARE

## 2023-11-29 NOTE — TELEPHONE ENCOUNTER
Pt contacted as pre-procedure phone call prior to planned CTA coronary for 12/1/2023. Reviewed with patient arrival time, location, nothing to eat or drink by mouth, okay to take blood pressure medications morning of procedure with a small sip of water, (but no diabetic medications the morning of),  needed, reviewed procedure instructions and allowed time for questions.

## 2023-12-01 ENCOUNTER — HOSPITAL ENCOUNTER (OUTPATIENT)
Dept: CT IMAGING | Facility: HOSPITAL | Age: 58
Discharge: HOME | End: 2023-12-01
Payer: MEDICARE

## 2023-12-01 VITALS
HEART RATE: 70 BPM | HEIGHT: 66 IN | BODY MASS INDEX: 41.78 KG/M2 | WEIGHT: 260 LBS | TEMPERATURE: 98.3 F | RESPIRATION RATE: 18 BRPM | DIASTOLIC BLOOD PRESSURE: 88 MMHG | SYSTOLIC BLOOD PRESSURE: 150 MMHG | OXYGEN SATURATION: 94 %

## 2023-12-01 DIAGNOSIS — R94.39 ABNORMAL NUCLEAR STRESS TEST: ICD-10-CM

## 2023-12-01 DIAGNOSIS — R07.2 PRECORDIAL PAIN: ICD-10-CM

## 2023-12-01 LAB — GLUCOSE BLDC GLUCOMTR-MCNC: 124 MG/DL (ref 70–130)

## 2023-12-01 PROCEDURE — 75574 CT ANGIO HRT W/3D IMAGE: CPT

## 2023-12-01 PROCEDURE — 25510000001 IOPAMIDOL PER 1 ML: Performed by: INTERNAL MEDICINE

## 2023-12-01 PROCEDURE — 82948 REAGENT STRIP/BLOOD GLUCOSE: CPT

## 2023-12-01 RX ORDER — METOPROLOL TARTRATE 1 MG/ML
5 INJECTION, SOLUTION INTRAVENOUS
Status: DISCONTINUED | OUTPATIENT
Start: 2023-12-01 | End: 2023-12-02 | Stop reason: HOSPADM

## 2023-12-01 RX ORDER — SODIUM CHLORIDE 0.9 % (FLUSH) 0.9 %
10 SYRINGE (ML) INJECTION AS NEEDED
Status: DISCONTINUED | OUTPATIENT
Start: 2023-12-01 | End: 2023-12-02 | Stop reason: HOSPADM

## 2023-12-01 RX ORDER — METOPROLOL TARTRATE 100 MG/1
100 TABLET ORAL ONCE
Status: COMPLETED | OUTPATIENT
Start: 2023-12-01 | End: 2023-12-01

## 2023-12-01 RX ORDER — SODIUM CHLORIDE 0.9 % (FLUSH) 0.9 %
10 SYRINGE (ML) INJECTION EVERY 12 HOURS SCHEDULED
Status: DISCONTINUED | OUTPATIENT
Start: 2023-12-01 | End: 2023-12-02 | Stop reason: HOSPADM

## 2023-12-01 RX ORDER — NITROGLYCERIN 400 UG/1
2 SPRAY ORAL
Status: DISCONTINUED | OUTPATIENT
Start: 2023-12-01 | End: 2023-12-01

## 2023-12-01 RX ORDER — NITROGLYCERIN 400 UG/1
1 SPRAY ORAL
Status: DISCONTINUED | OUTPATIENT
Start: 2023-12-01 | End: 2023-12-02 | Stop reason: HOSPADM

## 2023-12-01 RX ORDER — METOPROLOL TARTRATE 50 MG/1
50 TABLET, FILM COATED ORAL ONCE
Status: COMPLETED | OUTPATIENT
Start: 2023-12-01 | End: 2023-12-01

## 2023-12-01 RX ORDER — METOPROLOL TARTRATE 100 MG/1
200 TABLET ORAL ONCE
Status: COMPLETED | OUTPATIENT
Start: 2023-12-01 | End: 2023-12-01

## 2023-12-01 RX ORDER — METOPROLOL TARTRATE 50 MG/1
50 TABLET, FILM COATED ORAL
Status: DISCONTINUED | OUTPATIENT
Start: 2023-12-01 | End: 2023-12-02 | Stop reason: HOSPADM

## 2023-12-01 RX ORDER — SODIUM CHLORIDE 9 MG/ML
40 INJECTION, SOLUTION INTRAVENOUS AS NEEDED
Status: DISCONTINUED | OUTPATIENT
Start: 2023-12-01 | End: 2023-12-02 | Stop reason: HOSPADM

## 2023-12-01 RX ORDER — METOPROLOL TARTRATE 1 MG/ML
5 INJECTION, SOLUTION INTRAVENOUS ONCE
Status: COMPLETED | OUTPATIENT
Start: 2023-12-01 | End: 2023-12-01

## 2023-12-01 RX ADMIN — IVABRADINE 10 MG: 5 TABLET, FILM COATED ORAL at 14:22

## 2023-12-01 RX ADMIN — METOPROLOL TARTRATE 5 MG: 5 INJECTION INTRAVENOUS at 16:50

## 2023-12-01 RX ADMIN — METOPROLOL TARTRATE 200 MG: 100 TABLET, FILM COATED ORAL at 12:45

## 2023-12-01 RX ADMIN — METOPROLOL TARTRATE 5 MG: 5 INJECTION INTRAVENOUS at 17:02

## 2023-12-01 RX ADMIN — NITROGLYCERIN 1 SPRAY: 400 SPRAY ORAL at 17:07

## 2023-12-01 RX ADMIN — IOPAMIDOL 65 ML: 755 INJECTION, SOLUTION INTRAVENOUS at 17:15

## 2023-12-02 PROBLEM — I50.32 CHRONIC HEART FAILURE WITH PRESERVED EJECTION FRACTION (HFPEF): Status: ACTIVE | Noted: 2023-12-02

## 2023-12-04 ENCOUNTER — TELEPHONE (OUTPATIENT)
Dept: CARDIOLOGY | Facility: CLINIC | Age: 58
End: 2023-12-04
Payer: MEDICARE

## 2023-12-04 NOTE — TELEPHONE ENCOUNTER
----- Message from Miles Dueñas IV, MD sent at 12/2/2023  4:57 PM EST -----  As I discussed with you over the phone, your CT angiogram shows normal coronary arteries.  I reviewed your echo and recent office visit.  I suspect you have a component of diastolic heart failure due to uncontrolled hypertension and diabetes.  Like I mentioned to you on the phone, you need to follow-up with your PCP Krysten to work on blood pressure management as well as Dr. Chavez regarding your diabetes.  I congratulate you on 65 pounds of weight loss and I suspect that as you lose more weight your diabetes and blood pressure will be more easily controlled.    You can follow-up in cardiology clinic as needed.    Please cancel upcoming appointment with Ynes Sharif.

## 2023-12-21 ENCOUNTER — TELEPHONE (OUTPATIENT)
Dept: ENDOCRINOLOGY | Facility: CLINIC | Age: 58
End: 2023-12-21

## 2023-12-21 ENCOUNTER — OFFICE VISIT (OUTPATIENT)
Dept: ENDOCRINOLOGY | Facility: CLINIC | Age: 58
End: 2023-12-21
Payer: MEDICARE

## 2023-12-21 VITALS
BODY MASS INDEX: 42.11 KG/M2 | HEIGHT: 66 IN | OXYGEN SATURATION: 97 % | WEIGHT: 262 LBS | SYSTOLIC BLOOD PRESSURE: 130 MMHG | HEART RATE: 79 BPM | DIASTOLIC BLOOD PRESSURE: 84 MMHG

## 2023-12-21 DIAGNOSIS — E11.65 UNCONTROLLED TYPE 2 DIABETES MELLITUS WITH HYPERGLYCEMIA: Primary | ICD-10-CM

## 2023-12-21 DIAGNOSIS — E78.5 HYPERLIPIDEMIA LDL GOAL <70: Chronic | ICD-10-CM

## 2023-12-21 DIAGNOSIS — E11.49 DM (DIABETES MELLITUS), TYPE 2 WITH NEUROLOGICAL COMPLICATIONS: ICD-10-CM

## 2023-12-21 DIAGNOSIS — E89.0 POSTOPERATIVE HYPOTHYROIDISM: ICD-10-CM

## 2023-12-21 DIAGNOSIS — I10 ESSENTIAL HYPERTENSION: Chronic | ICD-10-CM

## 2023-12-21 LAB
EXPIRATION DATE: ABNORMAL
EXPIRATION DATE: ABNORMAL
GLUCOSE BLDC GLUCOMTR-MCNC: 162 MG/DL (ref 70–130)
HBA1C MFR BLD: 7.8 % (ref 4.5–5.7)
Lab: ABNORMAL
Lab: ABNORMAL
TSH SERPL DL<=0.05 MIU/L-ACNC: 5.77 UIU/ML (ref 0.27–4.2)

## 2023-12-21 PROCEDURE — 84443 ASSAY THYROID STIM HORMONE: CPT | Performed by: INTERNAL MEDICINE

## 2023-12-21 NOTE — ASSESSMENT & PLAN NOTE
Diabetes is improving with treatment.  Occ hypoglycemia.  Continue current treatment regimen.  But decrease basal insulin.  Diabetes will be reassessed in 3 months.

## 2023-12-21 NOTE — PROGRESS NOTES
"     Office Note      Date: 2023  Patient Name: Ivette Khalil  MRN: [id number]  : 1965    Chief Complaint   Patient presents with    Diabetes       History of Present Illness:   Ivette Khalil is a 58 y.o. female who presents for Diabetes type 2. Diagnosed in: . Treated in past with oral agents. She didn't tolerate farxiga due to yeast infections.  Current treatments: ozempic and basal bolus insulin. Number of insulin shots per day: 4. Checks blood sugar two times a day. Has low blood sugar: no. Aspirin use: No -  PUD. Statin use: No - intolerant - also stopped zetia due to cramps. ACE-I/ARB use: yes. Changes in health since last visit: none. Last eye exam 2023.     She remains on gabapentin for neuropathy.  It has helped but still having some burning.  She didn't respond to duloxetine or amitriptyline.     She had thyroidectomy in 2019 for benign goiter.  She is on T4 175mcg qd.  She is taking this correctly.  She isn't taking any interfering meds concurrently.  She hasn't noted any change in the size of her neck.  She denies any compressive sxs.  She denies any sxs of hypo- or hyperthyroidism at this time.     Subjective      Diabetic Complications:  Eyes: No  Kidneys: No  Feet: burning in feet  Heart: Yes -     Diet and Exercise:  Meals per day: 3  Minutes of exercise per week: 0 mins.    Review of Systems:   Review of Systems   Constitutional: Negative.    Cardiovascular: Negative.    Gastrointestinal:  Positive for constipation.   Endocrine: Negative.        The following portions of the patient's history were reviewed and updated as appropriate: allergies, current medications, past family history, past medical history, past social history, past surgical history, and problem list.    Objective     Visit Vitals  /84   Pulse 79   Ht 167.6 cm (66\")   Wt 119 kg (262 lb)   SpO2 97%   BMI 42.29 kg/m²       Physical Exam:  Physical Exam  Constitutional:       Appearance: Normal " appearance.   Neurological:      Mental Status: She is alert.         Labs:    HbA1c  Lab Results   Component Value Date    HGBA1C 7.8 (A) 12/21/2023       CMP  Lab Results   Component Value Date    GLUCOSE 238 (H) 10/24/2023    BUN 10 10/24/2023    CREATININE 0.70 10/24/2023    EGFRIFNONA 105 09/10/2020    BCR 14.3 10/24/2023    K 4.0 10/24/2023    CO2 28.6 10/24/2023    CALCIUM 8.9 10/24/2023    LABIL2 1.1 (L) 11/04/2015    AST 21 03/25/2022    ALT 26 03/25/2022        Lipid Panel  Lab Results   Component Value Date    CHLPL 213 (H) 10/30/2014    HDL 41 09/06/2023    LDL 93 09/06/2023    TRIG 169 (H) 09/06/2023        TSH  Lab Results   Component Value Date    TSH 0.046 (L) 09/06/2023    FREET4 1.55 12/31/2019        Hemoglobin A1C  Lab Results   Component Value Date    HGBA1C 7.8 (A) 12/21/2023        Microalbumin/Creatinine  Lab Results   Component Value Date    MALBCRERATIO 9.9 09/06/2023    MICROALBUR 2.2 09/06/2023           Assessment / Plan      Assessment & Plan:  Diagnoses and all orders for this visit:    1. Uncontrolled type 2 diabetes mellitus with hyperglycemia (Primary)  Assessment & Plan:  Diabetes is improving with treatment.  Occ hypoglycemia.  Continue current treatment regimen.  But decrease basal insulin.  Diabetes will be reassessed in 3 months.    Orders:  -     POC Glucose, Blood  -     POC Glycosylated Hemoglobin (Hb A1C)    2. Essential hypertension  Assessment & Plan:  Hypertension is improving with treatment.  Continue current treatment regimen.  Blood pressure will be reassessed at the next regular appointment.      3. Hyperlipidemia LDL goal <70  Assessment & Plan:  Intolerant of statin and ezetimibe.  Lipids weren't too bad last visit.      4. Postoperative hypothyroidism  Assessment & Plan:  Continue T4 tx.  Check TSH.    Orders:  -     TSH; Future    5. DM (diabetes mellitus), type 2 with neurological complications  Assessment & Plan:  Continue gabapentin.  Controlled substance  "agreement was signed today.        Current Outpatient Medications   Medication Instructions    gabapentin (NEURONTIN) 600 mg, Oral, 3 Times Daily    hydroCHLOROthiazide (MICROZIDE) 12.5 mg, Oral, Daily    ibuprofen (ADVIL,MOTRIN) 200 mg, Oral, Every 6 Hours PRN    insulin aspart (NOVOLOG) 25 Units, Subcutaneous, 3 Times Daily    Insulin Degludec (TRESIBA) 42 Units, Subcutaneous, 2 Times Daily    Insulin Pen Needle 32G X 4 MM misc Uses 6 per day    Insulin Syringe 31G X 5/16\" 0.5 ML misc Use TID    levothyroxine (SYNTHROID, LEVOTHROID) 175 mcg, Oral, Daily    losartan (COZAAR) 50 mg, Oral, Daily    metoprolol succinate XL (TOPROL-XL) 50 mg, Oral, Daily    omeprazole (PRILOSEC) 40 mg, Oral, Daily    Ozempic (2 MG/DOSE) 2 mg, Subcutaneous, Every 7 Days    True Metrix Blood Glucose Test test strip No dose, route, or frequency recorded.      Return in about 3 months (around 3/21/2024) for Recheck with A1c, TSH.    Kristian Bishop MD   12/21/2023  "

## 2023-12-22 RX ORDER — LEVOTHYROXINE SODIUM 0.2 MG/1
200 TABLET ORAL EVERY OTHER DAY
Qty: 45 TABLET | Refills: 3 | Status: SHIPPED | OUTPATIENT
Start: 2023-12-22

## 2023-12-22 RX ORDER — LEVOTHYROXINE SODIUM 175 UG/1
175 TABLET ORAL EVERY OTHER DAY
Qty: 45 TABLET | Refills: 3 | Status: SHIPPED | OUTPATIENT
Start: 2023-12-22

## 2024-01-11 RX ORDER — LOSARTAN POTASSIUM 50 MG/1
50 TABLET ORAL DAILY
Qty: 90 TABLET | Refills: 0 | Status: SHIPPED | OUTPATIENT
Start: 2024-01-11

## 2024-02-27 ENCOUNTER — TELEPHONE (OUTPATIENT)
Dept: CARDIOLOGY | Facility: CLINIC | Age: 59
End: 2024-02-27

## 2024-02-27 NOTE — TELEPHONE ENCOUNTER
Caller: Ivette Khalil    Relationship: Self    Best call back number: [phone]     What form or medical record are you requesting: LAST EKG AND METABOLIC PANEL    Who is requesting this form or medical record from you: ISIDRO BROOKS    How would you like to receive the form or medical records (pick-up, mail, fax): FAX  If fax, what is the fax number: [phone]    Timeframe paperwork needed: ASAP

## 2024-03-11 NOTE — TELEPHONE ENCOUNTER
Caller: Ivette Khalil    Relationship: Self    Best call back number: [phone]    What is the best time to reach you: ANY    Who are you requesting to speak with (clinical staff, provider,  specific staff member): ANY    What was the call regarding: PT IS CALLING TO GET AN UPDATE ON THIS PAPERWORK REQUEST.     Is it okay if the provider responds through MyChart: NO

## 2024-04-24 DIAGNOSIS — E11.49 DM (DIABETES MELLITUS), TYPE 2 WITH NEUROLOGICAL COMPLICATIONS: ICD-10-CM

## 2024-04-25 ENCOUNTER — TELEPHONE (OUTPATIENT)
Dept: ENDOCRINOLOGY | Facility: CLINIC | Age: 59
End: 2024-04-25
Payer: MEDICARE

## 2024-04-25 RX ORDER — GABAPENTIN 600 MG/1
TABLET ORAL
Qty: 270 TABLET | Refills: 1 | Status: SHIPPED | OUTPATIENT
Start: 2024-04-25

## 2024-04-25 NOTE — TELEPHONE ENCOUNTER
Rx Refill Note  Requested Prescriptions     Pending Prescriptions Disp Refills    gabapentin (NEURONTIN) 600 MG tablet [Pharmacy Med Name: GABAPENTIN 600 MG TABS 600 Tablet] 270 tablet 1     Sig: TAKE ONE TABLET BY MOUTH THREE TIMES A DAY AS NEEDED FOR NERVE PAIN      Last office visit with prescribing clinician: 12/21/2023      Next office visit with prescribing clinician: 5/22/2024                  Pinky Hernandez MA  04/25/24, 10:38 EDT

## 2024-04-25 NOTE — TELEPHONE ENCOUNTER
Rx Refill Note  Requested Prescriptions      No prescriptions requested or ordered in this encounter    Rx Refill Note  Requested Prescriptions      No prescriptions requested or ordered in this encounter          Last office visit with prescribing clinician: 12/21/2023     Next office visit with prescribing clinician: 5/22/2024         Mariah Youssef MA  04/25/24, 15:57 EDT       Last office visit with prescribing clinician: 12/21/2023     Next office visit with prescribing clinician: 5/22/2024         Mariah Youssef MA  04/25/24, 15:56 EDT

## 2024-04-25 NOTE — TELEPHONE ENCOUNTER
I explained to the pt that the pharmacy normally notified us when a prescription needs refilling,  signed and sent in her medication today, the pt verbalized that she would go to the pharmacy to get her medication.

## 2024-04-25 NOTE — TELEPHONE ENCOUNTER
Caller: Remi Ivettegiancarlo Antoine    Relationship: Self    Best call back number: [phone]     Requested Prescriptions:   Requested Prescriptions      No prescriptions requested or ordered in this encounter    gabapentin     Pharmacy where request should be sent:    [facility] [address]  Last office visit with prescribing clinician: 12/21/2023     Next office visit with prescribing clinician: 5/22/2024     Does the patient have less than a 3 day supply:  [x] Yes  [] No    PT DIDN'T RECEIVE ENOUGH DOSES TO LAST UNTIL APPT    Would you like a call back once the refill request has been completed: [x] Yes [] No    If the office needs to give you a call back, can they leave a voicemail: [x] Yes [] No    Jean Carlos Myers Rep   04/25/24 15:21 EDT

## 2024-05-22 ENCOUNTER — OFFICE VISIT (OUTPATIENT)
Dept: ENDOCRINOLOGY | Facility: CLINIC | Age: 59
End: 2024-05-22
Payer: MEDICARE

## 2024-05-22 ENCOUNTER — DOCUMENTATION (OUTPATIENT)
Dept: ENDOCRINOLOGY | Facility: CLINIC | Age: 59
End: 2024-05-22

## 2024-05-22 VITALS
OXYGEN SATURATION: 96 % | BODY MASS INDEX: 42.27 KG/M2 | WEIGHT: 263 LBS | SYSTOLIC BLOOD PRESSURE: 128 MMHG | HEART RATE: 86 BPM | DIASTOLIC BLOOD PRESSURE: 86 MMHG | HEIGHT: 66 IN | TEMPERATURE: 97.3 F

## 2024-05-22 DIAGNOSIS — I10 ESSENTIAL HYPERTENSION: Chronic | ICD-10-CM

## 2024-05-22 DIAGNOSIS — E89.0 POSTOPERATIVE HYPOTHYROIDISM: ICD-10-CM

## 2024-05-22 DIAGNOSIS — E11.65 UNCONTROLLED TYPE 2 DIABETES MELLITUS WITH HYPERGLYCEMIA: Primary | ICD-10-CM

## 2024-05-22 DIAGNOSIS — E78.5 HYPERLIPIDEMIA LDL GOAL <70: Chronic | ICD-10-CM

## 2024-05-22 DIAGNOSIS — E11.49 DM (DIABETES MELLITUS), TYPE 2 WITH NEUROLOGICAL COMPLICATIONS: ICD-10-CM

## 2024-05-22 LAB
EXPIRATION DATE: ABNORMAL
EXPIRATION DATE: ABNORMAL
GLUCOSE BLDC GLUCOMTR-MCNC: 240 MG/DL (ref 70–130)
HBA1C MFR BLD: 10.8 % (ref 4.5–5.7)
Lab: ABNORMAL
Lab: ABNORMAL

## 2024-05-22 RX ORDER — BLOOD-GLUCOSE SENSOR
1 EACH MISCELLANEOUS
Qty: 6 EACH | Refills: 3 | Status: SHIPPED | OUTPATIENT
Start: 2024-05-22

## 2024-05-22 NOTE — ASSESSMENT & PLAN NOTE
Diabetes is worsening.  Has been off ozempic.  Also had steroid injection.    Increase insulin.   Resume ozempic at lower dose and titrate back up.    Diabetes will be reassessed in 3 months.    Will send Rx for Rubén 3 CGM to see if this is covered.

## 2024-05-22 NOTE — PROGRESS NOTES
Office Note      Date: 2024  Patient Name: Ivette Khalil  MRN: [id number]  : 1965    Chief Complaint   Patient presents with    Diabetes       History of Present Illness:   Ivette Khalil is a 58 y.o. female who presents for Diabetes type 2. Diagnosed in: . Treated in past with oral agents. She didn't tolerate farxiga due to yeast infections.  Current treatments: ozempic and basal bolus insulin. Number of insulin shots per day: 4. Checks blood sugar two times a day. Has low blood sugar: no. Aspirin use: No -  PUD. Statin use: No - intolerant - also stopped zetia due to cramps. ACE-I/ARB use: yes. Changes in health since last visit: COVID-19 infection and gastroenteritis - stopped ozempic for a while. Last eye exam fall .     She remains on gabapentin for neuropathy.  It has helped but still having some burning.  She didn't respond to duloxetine or amitriptyline.     She had thyroidectomy in  for benign goiter.  She is taking T4 175mcg qd.  At the last visit we sent letter to have her start alternating doses of  T4 175 and 200mcg qod.  She didn't get the message though.  She is taking this correctly.  She isn't taking any interfering meds concurrently.  She hasn't noted any change in the size of her neck.  She denies any compressive sxs.  She denies any sxs of hypo- or hyperthyroidism at this time.     Subjective      Diabetic Complications:  Eyes: No  Kidneys: No  Feet: burning in feet  Heart: Yes -     Diet and Exercise:  Meals per day: 3  Minutes of exercise per week: 0 mins.    Review of Systems:   Review of Systems   Constitutional: Negative.    Cardiovascular: Negative.    Gastrointestinal:  Positive for constipation.   Endocrine: Negative.        The following portions of the patient's history were reviewed and updated as appropriate: allergies, current medications, past family history, past medical history, past social history, past surgical history, and problem  "list.    Objective     Visit Vitals  /86   Pulse 86   Temp 97.3 °F (36.3 °C) (Infrared)   Ht 167.6 cm (66\")   Wt 119 kg (263 lb)   SpO2 96%   BMI 42.45 kg/m²       Physical Exam:  Physical Exam  Constitutional:       Appearance: Normal appearance.   Neurological:      Mental Status: She is alert.         Labs:    HbA1c  Lab Results   Component Value Date    HGBA1C 10.8 (A) 05/22/2024       CMP  Lab Results   Component Value Date    GLUCOSE 238 (H) 10/24/2023    BUN 10 10/24/2023    CREATININE 0.70 10/24/2023    EGFRIFNONA 105 09/10/2020    BCR 14.3 10/24/2023    K 4.0 10/24/2023    CO2 28.6 10/24/2023    CALCIUM 8.9 10/24/2023    LABIL2 1.1 (L) 11/04/2015    AST 21 03/25/2022    ALT 26 03/25/2022        Lipid Panel  Lab Results   Component Value Date    CHLPL 213 (H) 10/30/2014    HDL 41 09/06/2023    LDL 93 09/06/2023    TRIG 169 (H) 09/06/2023        TSH  Lab Results   Component Value Date    TSH 5.770 (H) 12/21/2023    FREET4 1.55 12/31/2019        Hemoglobin A1C  Lab Results   Component Value Date    HGBA1C 10.8 (A) 05/22/2024        Microalbumin/Creatinine  Lab Results   Component Value Date    MALBCRERATIO 9.9 09/06/2023    MICROALBUR 2.2 09/06/2023           Assessment / Plan      Assessment & Plan:  Diagnoses and all orders for this visit:    1. Uncontrolled type 2 diabetes mellitus with hyperglycemia (Primary)  Assessment & Plan:  Diabetes is worsening.  Has been off ozempic.  Also had steroid injection.    Increase insulin.   Resume ozempic at lower dose and titrate back up.    Diabetes will be reassessed in 3 months.    Will send Rx for Rubén 3 CGM to see if this is covered.    Orders:  -     POC Glycosylated Hemoglobin (Hb A1C)  -     POC Glucose, Blood    2. Essential hypertension  Assessment & Plan:  BP improving with treatment.  Continue current meds.  Monitor BP at home.      3. Hyperlipidemia LDL goal <70  Assessment & Plan:  Intolerant of statin and ezetimibe.   Plan to check lipids next " "visit.      4. Postoperative hypothyroidism  Assessment & Plan:  Switch to alternating doses of T4 175 and 200mcg qod.  Plan to check TSH next visit.    Orders:  -     Cancel: TSH; Future    5. DM (diabetes mellitus), type 2 with neurological complications  Assessment & Plan:  Continue gabapentin.  Still having some pain.  She would like neurology referral.    Orders:  -     Ambulatory Referral to Neurology    Other orders  -     Continuous Glucose Sensor (FreeStyle Rubén 3 Sensor) misc; Use 1 each Every 14 (Fourteen) Days.  Dispense: 6 each; Refill: 3      Current Outpatient Medications   Medication Instructions    Continuous Glucose Sensor (FreeStyle Rubén 3 Sensor) misc 1 each, Does not apply, Every 14 Days    gabapentin (NEURONTIN) 600 MG tablet TAKE ONE TABLET BY MOUTH THREE TIMES A DAY AS NEEDED FOR NERVE PAIN    hydroCHLOROthiazide (MICROZIDE) 12.5 mg, Oral, Daily    ibuprofen (ADVIL,MOTRIN) 200 mg, Oral, Every 6 Hours PRN    insulin aspart (NOVOLOG) 25 Units, Subcutaneous, 3 Times Daily    Insulin Degludec (TRESIBA) 42 Units, Subcutaneous, 2 Times Daily    Insulin Pen Needle 32G X 4 MM misc Uses 6 per day    Insulin Syringe 31G X 5/16\" 0.5 ML misc Use TID    levothyroxine (SYNTHROID, LEVOTHROID) 175 mcg, Oral, Every Other Day    levothyroxine (SYNTHROID, LEVOTHROID) 200 mcg, Oral, Every Other Day    losartan (COZAAR) 50 mg, Oral, Daily, for blood pressure    metoprolol succinate XL (TOPROL-XL) 50 mg, Oral, Daily    omeprazole (PRILOSEC) 40 mg, Oral, Daily    True Metrix Blood Glucose Test test strip No dose, route, or frequency recorded.      Return in about 3 months (around 8/22/2024) for Recheck with A1c, CMP, lipid, TSH, microalbumin, foot exam, controlled substance agreement.    Electronically signed by: Kristian Bishop MD  05/22/2024  "

## 2024-05-28 ENCOUNTER — PRIOR AUTHORIZATION (OUTPATIENT)
Dept: ENDOCRINOLOGY | Facility: CLINIC | Age: 59
End: 2024-05-28
Payer: MEDICARE

## 2024-05-28 NOTE — TELEPHONE ENCOUNTER
CAMDEN GIRARD (Key: [id number])  PA Case ID #: [id number]  Rx #: [id number]  Need Help? Call us at [phone]  Outcome  Approved today  PA Case: [id number], Status: Approved, Coverage Starts on: 2/25/2024 12:00:00 AM, Coverage Ends on: 5/28/2025 12:00:00 AM.  Authorization Expiration Date: 5/27/2025  Drug  FreeStyle Rubén 3 Sensor  ePA cloud logo  Form  [employer] Electronic PA Form (2017 NCPDP)

## 2024-06-25 NOTE — TELEPHONE ENCOUNTER
Dr. Yen,   Patients  called and stated that the Percocet is not even touching his wife's pain that she is experiencing. She had shoulder surgery yesterday. Please advise, thank you.    Per chart, patient had ORIF left patella 6/4/24. Patient is 3 weeks out.     Abigail notified. Per Abigail, patient was on an antibiotic for 10 days after her surgery. Patient is not currently on any antibiotics currently. Abigail denies any signs of infection. Per Abigail, they were going to put patient on an antibiotic for the cracked tooth, but have not.

## 2024-07-09 ENCOUNTER — TELEPHONE (OUTPATIENT)
Dept: ENDOCRINOLOGY | Facility: CLINIC | Age: 59
End: 2024-07-09
Payer: MEDICARE

## 2024-07-16 ENCOUNTER — OFFICE VISIT (OUTPATIENT)
Dept: NEUROLOGY | Facility: CLINIC | Age: 59
End: 2024-07-16
Payer: MEDICARE

## 2024-07-16 ENCOUNTER — LAB (OUTPATIENT)
Dept: LAB | Facility: HOSPITAL | Age: 59
End: 2024-07-16
Payer: MEDICARE

## 2024-07-16 VITALS
HEART RATE: 68 BPM | HEIGHT: 66 IN | DIASTOLIC BLOOD PRESSURE: 98 MMHG | SYSTOLIC BLOOD PRESSURE: 148 MMHG | OXYGEN SATURATION: 97 % | BODY MASS INDEX: 42.75 KG/M2 | WEIGHT: 266 LBS

## 2024-07-16 DIAGNOSIS — G60.9 IDIOPATHIC PERIPHERAL NEUROPATHY: Primary | ICD-10-CM

## 2024-07-16 DIAGNOSIS — G25.81 RESTLESS LEGS SYNDROME (RLS): ICD-10-CM

## 2024-07-16 DIAGNOSIS — D50.9 IRON DEFICIENCY ANEMIA, UNSPECIFIED IRON DEFICIENCY ANEMIA TYPE: ICD-10-CM

## 2024-07-16 DIAGNOSIS — G60.9 IDIOPATHIC PERIPHERAL NEUROPATHY: ICD-10-CM

## 2024-07-16 LAB
FERRITIN SERPL-MCNC: 163 NG/ML (ref 13–150)
FOLATE SERPL-MCNC: 9.38 NG/ML (ref 4.78–24.2)
T4 FREE SERPL-MCNC: 1.57 NG/DL (ref 0.92–1.68)
TSH SERPL DL<=0.05 MIU/L-ACNC: 1.05 UIU/ML (ref 0.27–4.2)
VIT B12 BLD-MCNC: 404 PG/ML (ref 211–946)

## 2024-07-16 PROCEDURE — 86235 NUCLEAR ANTIGEN ANTIBODY: CPT

## 2024-07-16 PROCEDURE — 84165 PROTEIN E-PHORESIS SERUM: CPT

## 2024-07-16 PROCEDURE — 99204 OFFICE O/P NEW MOD 45 MIN: CPT | Performed by: PSYCHIATRY & NEUROLOGY

## 2024-07-16 PROCEDURE — 86038 ANTINUCLEAR ANTIBODIES: CPT

## 2024-07-16 PROCEDURE — 3077F SYST BP >= 140 MM HG: CPT | Performed by: PSYCHIATRY & NEUROLOGY

## 2024-07-16 PROCEDURE — 84207 ASSAY OF VITAMIN B-6: CPT

## 2024-07-16 PROCEDURE — 84439 ASSAY OF FREE THYROXINE: CPT

## 2024-07-16 PROCEDURE — 1160F RVW MEDS BY RX/DR IN RCRD: CPT | Performed by: PSYCHIATRY & NEUROLOGY

## 2024-07-16 PROCEDURE — 36415 COLL VENOUS BLD VENIPUNCTURE: CPT

## 2024-07-16 PROCEDURE — 84155 ASSAY OF PROTEIN SERUM: CPT

## 2024-07-16 PROCEDURE — 84443 ASSAY THYROID STIM HORMONE: CPT

## 2024-07-16 PROCEDURE — 82607 VITAMIN B-12: CPT

## 2024-07-16 PROCEDURE — 82746 ASSAY OF FOLIC ACID SERUM: CPT

## 2024-07-16 PROCEDURE — 1159F MED LIST DOCD IN RCRD: CPT | Performed by: PSYCHIATRY & NEUROLOGY

## 2024-07-16 PROCEDURE — 86225 DNA ANTIBODY NATIVE: CPT

## 2024-07-16 PROCEDURE — 3080F DIAST BP >= 90 MM HG: CPT | Performed by: PSYCHIATRY & NEUROLOGY

## 2024-07-16 PROCEDURE — 82728 ASSAY OF FERRITIN: CPT

## 2024-07-16 RX ORDER — SEMAGLUTIDE 1.34 MG/ML
0.5 INJECTION, SOLUTION SUBCUTANEOUS
COMMUNITY

## 2024-07-16 RX ORDER — PREGABALIN 150 MG/1
150 CAPSULE ORAL 3 TIMES DAILY
Qty: 90 CAPSULE | Refills: 5 | Status: SHIPPED | OUTPATIENT
Start: 2024-07-16

## 2024-07-16 NOTE — PROGRESS NOTES
Subjective:    CC: Ivette Khalil is seen today in consultation at the request of Kristian Bishop MD for Peripheral Neuropathy       HPI:  58-year-old female accompanied by her  with a history of diabetes mellitus type 2, anxiety, hypertension, hyperlipidemia, hypothyroidism, carpal tunnel status post bilateral median release, LINDA not on CPAP presents with neuropathy.  As per patient she has excruciating pain in her legs and feet as well as numbness in her hands and feet for several years.  She also has a sensation of bugs crawling under the skin with an urge to move her legs or walk around more so at night.  In fact she does have difficulty sleeping at night because of the symptoms.  Her diabetes has been poorly controlled with her last A1c being 10.8.  Last TSH was 5.7.  She was prescribed Ozempic that she had stopped taking a few months ago as she had COVID twice but has restarted it again.  Was also prescribed gabapentin currently at 600 mg 3 times daily which is not helping much (has also tried Cymbalta and amitriptyline previously).  At times she takes 3 pills of gabapentin at a time twice a day.  She denies any alcohol intake or family history of neuropathy.     The following portions of the patient's history were reviewed today and updated as of 07/16/2024  : allergies, current medications, past family history, past medical history, past social history, past surgical history, and problem list  These document will be scanned to patient's chart.      Current Outpatient Medications:     Continuous Glucose Sensor (FreeStyle Rubén 3 Sensor) misc, Use 1 each Every 14 (Fourteen) Days., Disp: 6 each, Rfl: 3    hydroCHLOROthiazide (MICROZIDE) 12.5 MG capsule, Take 1 capsule by mouth Daily., Disp: 90 capsule, Rfl: 1    ibuprofen (ADVIL,MOTRIN) 200 MG tablet, Take 1 tablet by mouth Every 6 (Six) Hours As Needed for Mild Pain., Disp: , Rfl:     insulin aspart (NovoLOG) 100 UNIT/ML injection, Inject 25  "Units under the skin into the appropriate area as directed 3 (Three) Times a Day., Disp: 30 mL, Rfl: 0    Insulin Degludec (Tresiba) 100 UNIT/ML solution injection, Inject 42 Units under the skin into the appropriate area as directed 2 (Two) Times a Day., Disp: , Rfl:     Insulin Pen Needle 32G X 4 MM misc, Uses 6 per day, Disp: 550 each, Rfl: 3    Insulin Syringe 31G X 5/16\" 0.5 ML misc, Use TID, Disp: 300 each, Rfl: 1    levothyroxine (SYNTHROID, LEVOTHROID) 175 MCG tablet, Take 1 tablet by mouth Every Other Day., Disp: 45 tablet, Rfl: 3    levothyroxine (SYNTHROID, LEVOTHROID) 200 MCG tablet, Take 1 tablet by mouth Every Other Day., Disp: 45 tablet, Rfl: 3    losartan (COZAAR) 50 MG tablet, TAKE ONE TABLET BY MOUTH EVERY DAY FOR BLOOD PRESSURE, Disp: 90 tablet, Rfl: 0    metoprolol succinate XL (TOPROL-XL) 50 MG 24 hr tablet, Take 1 tablet by mouth Daily., Disp: 90 tablet, Rfl: 3    omeprazole (priLOSEC) 40 MG capsule, Take 1 capsule by mouth Daily., Disp: , Rfl:     Semaglutide,0.25 or 0.5MG/DOS, (Ozempic, 0.25 or 0.5 MG/DOSE,) 2 MG/1.5ML solution pen-injector, Inject 0.5 mg under the skin into the appropriate area as directed Every 7 (Seven) Days., Disp: , Rfl:     True Metrix Blood Glucose Test test strip, , Disp: , Rfl:     pregabalin (Lyrica) 150 MG capsule, Take 1 capsule by mouth 3 (Three) Times a Day., Disp: 90 capsule, Rfl: 5   Past Medical History:   Diagnosis Date    Acute bronchitis     Anxiety     Arthritis     Blood in stool     Broken arm     left arm broke when feel off horse around 2000    Constipation     Delayed emergence from anesthesia     Diabetes mellitus     Disease of thyroid gland     thyroidectomy    Elevated cholesterol     GERD (gastroesophageal reflux disease)     Heart attack     unsure of when     Hiatal hernia     History of diverticulosis     History of ear infections     Hypertension     Hypothyroidism     Migraine     Obesity     PONV (postoperative nausea and vomiting)     " "Sleep apnea     no bipap or cpap used    Tinnitus     Wears glasses     reading glasses only      Past Surgical History:   Procedure Laterality Date    APPENDECTOMY      open    BILATERAL BREAST REDUCTION      BLADDER SURGERY      mesh removed 2nd to complications    CARDIAC CATHETERIZATION N/A 2016    Procedure: Left Heart Cath- Right radial approach. ;  Surgeon: Shalom Giron MD;  Location:  [facility];  Service:      SECTION      CHOLECYSTECTOMY      laproscopic    COLON RESECTION      sigmoidectomy with multiple post operations and packing    COLONOSCOPY      COLONOSCOPY N/A 2021    Procedure: COLONOSCOPY;  Surgeon: Brien Turner MD;  Location:  [facility];  Service: Gastroenterology;  Laterality: N/A;    EYELID RETRACTION REPAIR Bilateral     HYSTERECTOMY      removal of both ovaries/complete    ORIF HUMERUS FRACTURE      REDUCTION MAMMAPLASTY      SHOULDER SURGERY Left     SIGMOIDECTOMY      THYROIDECTOMY N/A 10/24/2019    Procedure: THYROIDECTOMY TOTAL;  Surgeon: Layo Perera MD;  Location:  [facility];  Service: General      Family History   Problem Relation Age of Onset    Diabetes Mother     Heart disease Mother     Hypertension Mother     Other Father         black lung    Diabetes Father     Hypertension Father     Heart disease Father     COPD Father     Diabetes Sister     Heart disease Brother     Heart attack Brother         Massive MI at 51      Social History     Socioeconomic History    Marital status:     Number of children: 2   Tobacco Use    Smoking status: Never     Passive exposure: Never    Smokeless tobacco: Never   Vaping Use    Vaping status: Never Used   Substance and Sexual Activity    Alcohol use: No    Drug use: Never    Sexual activity: Defer     Review of Systems   Neurological:  Positive for numbness.   All other systems reviewed and are negative.    Objective:    /98   Pulse 68   Ht 167.6 cm (66\")   Wt 121 kg " (266 lb)   SpO2 97%   BMI 42.93 kg/m²     Neurology Exam:    General apperance: Obese    Mental status: Alert, awake and oriented to time place and person.    Recent and Remote memory: Intact.    Attention span and Concentration: Normal.     Language and Speech: Intact- No dysarthria.    Fluency, Naming , Repitition and Comprehension:  Intact    Cranial Nerves:   CN II: Visual fields are full. Intact. Fundi - Normal, No papillederma, Pupils - MARIAN  CN III, IV and VI: Extraocular movements are intact. Normal saccades.   CN V: Facial sensation is intact.   CN VII: Muscles of facial expression reveal no asymmetry. Intact.   CN VIII: Hearing is intact. Whispered voice intact.   CN IX and X: Palate elevates symmetrically. Intact  CN XI: Shoulder shrug is intact.   CN XII: Tongue is midline without evidence of atrophy or fasciculation.     Ophthalmoscopic exam of optic disc-normal    Motor:  Right UE muscle strength 5/5. Normal tone.     Left UE muscle strength 5/5. Normal tone.      Right LE muscle strength5/5. Normal tone.     Left LE muscle strength 5/5. Normal tone.      Sensory: Reduced to pinprick in bilateral upper extremities to level of elbow and in bilateral lower extremities to level of knees with markedly reduced vibration    DTRs: 2+ bilaterally in upper and lower extremities.    Babinski: Negative bilaterally.    Co-ordination: Normal finger-to-nose, heel to shin B/L.    Rhomberg: Positive    Gait: Normal.    Cardiovascular: Regular rate and rhythm without murmur, gallop or rub.    Assessment and Plan:  1. Idiopathic peripheral neuropathy  Patient has severe diabetic neuropathy  I will also check the rest of her neuropathy panel  I have told her to gradually transition from gabapentin to Lyrica 150 mg 3 times daily  Counseled on better glycemic control.  Last A1c was 10.8    - LINWOOD by IFA, Reflex 9-biomarkers profile; Future  - Protein Elec + Interp, Serum; Future  - TSH+Free T4; Future  - Vitamin B12 &  Folate; Future  - Vitamin B6; Future  - pregabalin (Lyrica) 150 MG capsule; Take 1 capsule by mouth 3 (Three) Times a Day.  Dispense: 90 capsule; Refill: 5    2. Restless legs syndrome (RLS)  Based on her symptoms she could have RLS in addition to her neuropathy  Hopefully Lyrica should help  I will check a ferritin level.  I have told her to avoid a big carbohydrate meal/sugar intake in the evening    Return in about 3 months (around 10/16/2024).     I spent over 40 minutes with the patient face to face out of which over 50% (30 minutes) was spent in management, instructions and education.     Kya Kowalski MD

## 2024-07-17 LAB
ALBUMIN SERPL ELPH-MCNC: 3.3 G/DL (ref 2.9–4.4)
ALBUMIN/GLOB SERPL: 1 {RATIO} (ref 0.7–1.7)
ALPHA1 GLOB SERPL ELPH-MCNC: 0.3 G/DL (ref 0–0.4)
ALPHA2 GLOB SERPL ELPH-MCNC: 0.7 G/DL (ref 0.4–1)
B-GLOBULIN SERPL ELPH-MCNC: 1.1 G/DL (ref 0.7–1.3)
GAMMA GLOB SERPL ELPH-MCNC: 1.2 G/DL (ref 0.4–1.8)
GLOBULIN SER CALC-MCNC: 3.3 G/DL (ref 2.2–3.9)
LABORATORY COMMENT REPORT: NORMAL
M PROTEIN SERPL ELPH-MCNC: NORMAL G/DL
PROT PATTERN SERPL ELPH-IMP: NORMAL
PROT SERPL-MCNC: 6.6 G/DL (ref 6–8.5)

## 2024-07-18 LAB
ANA HOMOGEN TITR SER: ABNORMAL {TITER}
ANA SER QL IF: POSITIVE
CENTROMERE B AB SER-ACNC: <0.2 AI (ref 0–0.9)
CHROMATIN AB SERPL-ACNC: <0.2 AI (ref 0–0.9)
DSDNA AB SER-ACNC: 1 IU/ML (ref 0–9)
ENA JO1 AB SER-ACNC: <0.2 AI (ref 0–0.9)
ENA RNP AB SER-ACNC: <0.2 AI (ref 0–0.9)
ENA SCL70 AB SER-ACNC: <0.2 AI (ref 0–0.9)
ENA SM AB SER-ACNC: <0.2 AI (ref 0–0.9)
ENA SS-A AB SER-ACNC: <0.2 AI (ref 0–0.9)
ENA SS-B AB SER-ACNC: <0.2 AI (ref 0–0.9)
LABORATORY COMMENT REPORT: ABNORMAL
Lab: ABNORMAL
Lab: ABNORMAL

## 2024-07-19 LAB — PYRIDOXAL PHOS SERPL-MCNC: 6.4 UG/L (ref 3.4–65.2)

## 2024-07-23 ENCOUNTER — TELEPHONE (OUTPATIENT)
Dept: ENDOCRINOLOGY | Facility: CLINIC | Age: 59
End: 2024-07-23
Payer: MEDICARE

## 2024-07-23 NOTE — TELEPHONE ENCOUNTER
Patients sugar is reading incorrectly and patient is not feeling well patient would like to speak with medical staff as soon as possible.

## 2024-07-23 NOTE — TELEPHONE ENCOUNTER
"Spoke with pt, she had a false critical low reading on her Rubén, she did a finger stick and tested at 120. Discussed compression lows. Re feeling poorly, pt states she is feeling tired and just not her \"normal\". She has been diligent with diet and medications and BG has been <200 when she is typically 400. Discussed these symptoms could be related to adjusting to new BG levels. Pt will call back with any worsening symptoms.  "

## 2024-09-12 RX ORDER — BLOOD-GLUCOSE SENSOR
EACH MISCELLANEOUS
Qty: 6 EACH | Refills: 1 | OUTPATIENT
Start: 2024-09-12

## 2024-09-12 RX ORDER — BLOOD-GLUCOSE SENSOR
EACH MISCELLANEOUS
Qty: 6 EACH | Refills: 1 | Status: SHIPPED | OUTPATIENT
Start: 2024-09-12

## 2024-09-12 NOTE — TELEPHONE ENCOUNTER
Caller: CAMDEN GIRARD    Relationship: SELF    Best call back number: [phone]    Requested Prescriptions:   Requested Prescriptions     Pending Prescriptions Disp Refills    Continuous Glucose Sensor (FreeStyle Rubén 3 Sensor) [facility] [place] 1        Pharmacy where request should be sent: [place], [place] - [address] - [phone] PH - [phone] FX     Last office visit with prescribing clinician: 5/22/2024   Last telemedicine visit with prescribing clinician: Visit date not found   Next office visit with prescribing clinician: 12/12/2024     Additional details provided by patient:     Does the patient have less than a 3 day supply:  [] Yes  [] No    Would you like a call back once the refill request has been completed: [] Yes [] No    If the office needs to give you a call back, can they leave a voicemail: [] Yes [] No    Jean Carlos Bledsoe   09/12/24 12:05 EDT

## 2024-10-10 ENCOUNTER — TELEPHONE (OUTPATIENT)
Dept: ENDOCRINOLOGY | Facility: CLINIC | Age: 59
End: 2024-10-10
Payer: MEDICARE

## 2024-10-10 ENCOUNTER — TELEPHONE (OUTPATIENT)
Dept: ENDOCRINOLOGY | Facility: CLINIC | Age: 59
End: 2024-10-10

## 2024-10-10 NOTE — TELEPHONE ENCOUNTER
Patient notified.  She states she has not been taking her lunchtime insulin dose for about a month.  Did not take any insulin last night or this morning.  Blood sugar now is 115.  She had been taking Novolog 25u with breakfast and dinner and 30u of Tresiba twice daily.

## 2024-10-10 NOTE — TELEPHONE ENCOUNTER
When speaking with patient regarding her patient assistance, patient requested a first available appointment with Dr Bishop due to hypoglycemic reactions. Her CGM is warning her but the patient states that the hypoglycemia is making her ill.  I will notify the front office of request.  Her appointment with Dr Bishop is 12/12/24.

## 2024-10-10 NOTE — TELEPHONE ENCOUNTER
Spoke with patient regarding patient assistance.  Patient will  Ozempic. Tresiba, and Novolog at her earliest convenience.

## 2024-10-10 NOTE — TELEPHONE ENCOUNTER
Spoke with patient.  She states that she has noticed it has been occurring around 130pm, after lunch.  Is starting to occur more often.  Has had 3 episodes this week.  Has appointment scheduled on 10/24/2024.

## 2024-10-24 ENCOUNTER — OFFICE VISIT (OUTPATIENT)
Dept: ENDOCRINOLOGY | Facility: CLINIC | Age: 59
End: 2024-10-24
Payer: MEDICARE

## 2024-10-24 VITALS
HEART RATE: 80 BPM | HEIGHT: 66 IN | OXYGEN SATURATION: 98 % | DIASTOLIC BLOOD PRESSURE: 78 MMHG | SYSTOLIC BLOOD PRESSURE: 128 MMHG | BODY MASS INDEX: 42.75 KG/M2 | WEIGHT: 266 LBS

## 2024-10-24 DIAGNOSIS — E89.0 POSTOPERATIVE HYPOTHYROIDISM: ICD-10-CM

## 2024-10-24 DIAGNOSIS — E78.5 HYPERLIPIDEMIA LDL GOAL <70: Chronic | ICD-10-CM

## 2024-10-24 DIAGNOSIS — I10 ESSENTIAL HYPERTENSION: Chronic | ICD-10-CM

## 2024-10-24 DIAGNOSIS — E11.49 DM (DIABETES MELLITUS), TYPE 2 WITH NEUROLOGICAL COMPLICATIONS: ICD-10-CM

## 2024-10-24 DIAGNOSIS — E11.65 UNCONTROLLED TYPE 2 DIABETES MELLITUS WITH HYPERGLYCEMIA: Primary | ICD-10-CM

## 2024-10-24 LAB
ALBUMIN SERPL-MCNC: 3.5 G/DL (ref 3.5–5.2)
ALBUMIN UR-MCNC: <1.2 MG/DL
ALBUMIN/GLOB SERPL: 1 G/DL
ALP SERPL-CCNC: 139 U/L (ref 39–117)
ALT SERPL W P-5'-P-CCNC: 28 U/L (ref 1–33)
ANION GAP SERPL CALCULATED.3IONS-SCNC: 9.5 MMOL/L (ref 5–15)
AST SERPL-CCNC: 22 U/L (ref 1–32)
BILIRUB SERPL-MCNC: 0.2 MG/DL (ref 0–1.2)
BUN SERPL-MCNC: 14 MG/DL (ref 8–23)
BUN/CREAT SERPL: 14.6 (ref 7–25)
CALCIUM SPEC-SCNC: 9.1 MG/DL (ref 8.6–10.5)
CHLORIDE SERPL-SCNC: 104 MMOL/L (ref 98–107)
CHOLEST SERPL-MCNC: 206 MG/DL (ref 0–200)
CO2 SERPL-SCNC: 26.5 MMOL/L (ref 22–29)
CREAT SERPL-MCNC: 0.96 MG/DL (ref 0.57–1)
CREAT UR-MCNC: 133.4 MG/DL
EGFRCR SERPLBLD CKD-EPI 2021: 67.5 ML/MIN/1.73
EXPIRATION DATE: ABNORMAL
EXPIRATION DATE: ABNORMAL
GLOBULIN UR ELPH-MCNC: 3.5 GM/DL
GLUCOSE BLDC GLUCOMTR-MCNC: 216 MG/DL (ref 70–130)
GLUCOSE SERPL-MCNC: 176 MG/DL (ref 65–99)
HBA1C MFR BLD: 8 % (ref 4.5–5.7)
HDLC SERPL-MCNC: 37 MG/DL (ref 40–60)
LDLC SERPL CALC-MCNC: 144 MG/DL (ref 0–100)
LDLC/HDLC SERPL: 3.82 {RATIO}
Lab: ABNORMAL
Lab: ABNORMAL
MICROALBUMIN/CREAT UR: NORMAL MG/G{CREAT}
POTASSIUM SERPL-SCNC: 4 MMOL/L (ref 3.5–5.2)
PROT SERPL-MCNC: 7 G/DL (ref 6–8.5)
SODIUM SERPL-SCNC: 140 MMOL/L (ref 136–145)
TRIGL SERPL-MCNC: 139 MG/DL (ref 0–150)
TSH SERPL DL<=0.05 MIU/L-ACNC: 1.8 UIU/ML (ref 0.27–4.2)
VLDLC SERPL-MCNC: 25 MG/DL (ref 5–40)

## 2024-10-24 PROCEDURE — 80061 LIPID PANEL: CPT | Performed by: INTERNAL MEDICINE

## 2024-10-24 PROCEDURE — 84443 ASSAY THYROID STIM HORMONE: CPT | Performed by: INTERNAL MEDICINE

## 2024-10-24 PROCEDURE — 80053 COMPREHEN METABOLIC PANEL: CPT | Performed by: INTERNAL MEDICINE

## 2024-10-24 PROCEDURE — 82570 ASSAY OF URINE CREATININE: CPT | Performed by: INTERNAL MEDICINE

## 2024-10-24 PROCEDURE — 82043 UR ALBUMIN QUANTITATIVE: CPT | Performed by: INTERNAL MEDICINE

## 2024-10-24 RX ORDER — LEVOTHYROXINE SODIUM 200 UG/1
200 TABLET ORAL EVERY OTHER DAY
Qty: 45 TABLET | Refills: 3 | Status: SHIPPED | OUTPATIENT
Start: 2024-10-24

## 2024-10-24 RX ORDER — LEVOTHYROXINE SODIUM 175 UG/1
175 TABLET ORAL EVERY OTHER DAY
Qty: 45 TABLET | Refills: 3 | Status: SHIPPED | OUTPATIENT
Start: 2024-10-24

## 2024-10-24 NOTE — ASSESSMENT & PLAN NOTE
Continue T4 tx.  She is only getting 175mcg daily.  I have resent RX (once again) for alternating doses.  Check TSH.

## 2024-10-24 NOTE — PROGRESS NOTES
Office Note      Date: 10/24/2024  Patient Name: Ivette Khalil  MRN: [id number]  : 1965    Chief Complaint   Patient presents with    Diabetes       History of Present Illness:   Ivette Khalil is a 59 y.o. female who presents for Diabetes type 2. Diagnosed in: . Treated in past with oral agents. She didn't tolerate farxiga due to yeast infections.  Current treatments: ozempic and basal bolus insulin. Number of insulin shots per day: 4. Checks blood sugar two times a day. Has low blood sugar: no. Aspirin use: No -  PUD. Statin use: No - intolerant - also stopped zetia due to cramps. ACE-I/ARB use: yes. Changes in health since last visit: none. Last eye exam fall .     She saw neurologist.  Was changed from gabapentin to pregabalin and is doing much better now.  She didn't respond to duloxetine or amitriptyline.     She had thyroidectomy in 2019 for benign goiter.  She is taking T4.  We have prescribed alternating doses of  175 and 200mcg qod.  However the pharmacy told her to take 175mcg daily which is what she is on now.  She is taking this correctly.  She isn't taking any interfering meds concurrently.  She hasn't noted any change in the size of her neck.  She denies any compressive sxs.  She denies any sxs of hypo- or hyperthyroidism at this time.     Subjective      Diabetic Complications:  Eyes: No  Kidneys: No  Feet: burning in feet  Heart: No    Diet and Exercise:  Meals per day: 3  Minutes of exercise per week: 0 mins.    Review of Systems:   Review of Systems   Constitutional: Negative.    Cardiovascular: Negative.    Gastrointestinal:  Positive for constipation.   Endocrine: Negative.        The following portions of the patient's history were reviewed and updated as appropriate: allergies, current medications, past family history, past medical history, past social history, past surgical history, and problem list.    Objective     Visit Vitals  /78 (BP Location: Right  "arm, Patient Position: Sitting, Cuff Size: Adult)   Pulse 80   Ht 167.6 cm (66\")   Wt 121 kg (266 lb)   SpO2 98%   BMI 42.93 kg/m²       Physical Exam:  Physical Exam  Constitutional:       Appearance: Normal appearance.   Neurological:      Mental Status: She is alert.         Labs:    HbA1c  Lab Results   Component Value Date    HGBA1C 8.0 (A) 10/24/2024       CMP  Lab Results   Component Value Date    GLUCOSE 238 (H) 10/24/2023    BUN 10 10/24/2023    CREATININE 0.70 10/24/2023    EGFRIFNONA 105 09/10/2020    BCR 14.3 10/24/2023    K 4.0 10/24/2023    CO2 28.6 10/24/2023    CALCIUM 8.9 10/24/2023    PROTENTOTREF 6.6 07/16/2024    LABIL2 1.0 07/16/2024    AST 21 03/25/2022    ALT 26 03/25/2022        Lipid Panel  Lab Results   Component Value Date    CHLPL 213 (H) 10/30/2014    HDL 41 09/06/2023    LDL 93 09/06/2023    TRIG 169 (H) 09/06/2023        TSH  Lab Results   Component Value Date    TSH 1.050 07/16/2024    FREET4 1.57 07/16/2024        Hemoglobin A1C  Lab Results   Component Value Date    HGBA1C 8.0 (A) 10/24/2024        Microalbumin/Creatinine  Lab Results   Component Value Date    MALBCRERATIO 9.9 09/06/2023    MICROALBUR 2.2 09/06/2023           Assessment / Plan      Assessment & Plan:  Diagnoses and all orders for this visit:    1. Uncontrolled type 2 diabetes mellitus with hyperglycemia (Primary)  Assessment & Plan:  Diabetes is improving with treatment.  But having some hypoglycemia.  She was having lows in the early afternoon.  She has stopped the lunchtime insulin dose.  Will try resuming lunch insulin at lower dose.  Diabetes will be reassessed in 6 months.    She is having trouble tolerating the ozempic.  Will send in Rx for mounjaro to see if this is better tolerated.  We can do gradual dose escalation.    FreeStyle Rubén 3 CGM was downloaded today.  Data was reviewed from 10/11/24 to 10/24/24.  This showed some day to day variability.  Some days having good overnight glucose.  Having some " "spikes with lunch.  Will resume lower dose mealtime insulin at lunch.    Orders:  -     POC Glucose, Blood  -     POC Glycosylated Hemoglobin (Hb A1C)  -     Comprehensive Metabolic Panel; Future  -     Lipid Panel; Future  -     Microalbumin / Creatinine Urine Ratio - Urine, Clean Catch; Future  -     TSH; Future    2. Essential hypertension  Assessment & Plan:  Hypertension is stable and controlled  Continue current treatment regimen.  Blood pressure will be reassessed in 6 months.      3. Hyperlipidemia LDL goal <70  Assessment & Plan:  Intolerant of statin and ezetimibe.  Check lipids.      4. Postoperative hypothyroidism  Assessment & Plan:  Continue T4 tx.  She is only getting 175mcg daily.  I have resent RX (once again) for alternating doses.  Check TSH.      5. DM (diabetes mellitus), type 2 with neurological complications  Assessment & Plan:  She has seen neurologist.  Was changed from gabapentin to pregabalin and is doing much better.      Other orders  -     Tirzepatide (Mounjaro) 2.5 MG/0.5ML solution pen-injector pen; Inject 0.5 mL under the skin into the appropriate area as directed 1 (One) Time Per Week.  Dispense: 2 mL; Refill: 0  -     levothyroxine (SYNTHROID, LEVOTHROID) 200 MCG tablet; Take 1 tablet by mouth Every Other Day.  Dispense: 45 tablet; Refill: 3  -     levothyroxine (SYNTHROID, LEVOTHROID) 175 MCG tablet; Take 1 tablet by mouth Every Other Day.  Dispense: 45 tablet; Refill: 3      Current Outpatient Medications   Medication Instructions    Continuous Glucose Sensor (FreeStyle Rubén 3 Sensor) misc USE AS DIRECTED AND CHANGE EVERY 14 DAYS    hydroCHLOROthiazide (MICROZIDE) 12.5 mg, Oral, Daily    ibuprofen (ADVIL,MOTRIN) 200 mg, Every 6 Hours PRN    insulin aspart (NOVOLOG) 25 Units, Subcutaneous, 3 Times Daily    Insulin Degludec (TRESIBA) 42 Units, Subcutaneous, 2 Times Daily    Insulin Pen Needle 32G X 4 MM misc Uses 6 per day    Insulin Syringe 31G X 5/16\" 0.5 ML misc Use TID    " levothyroxine (SYNTHROID, LEVOTHROID) 200 mcg, Oral, Every Other Day    levothyroxine (SYNTHROID, LEVOTHROID) 175 mcg, Oral, Every Other Day    losartan (COZAAR) 50 mg, Oral, Daily, for blood pressure    metoprolol succinate XL (TOPROL-XL) 50 mg, Oral, Daily    omeprazole (PRILOSEC) 40 mg, Daily    pregabalin (LYRICA) 150 mg, Oral, 3 Times Daily    Tirzepatide (MOUNJARO) 2.5 mg, Subcutaneous, Weekly    True Metrix Blood Glucose Test test strip No dose, route, or frequency recorded.      Return in about 6 months (around 4/24/2025) for Recheck with A1c, TSH, foot exam.    Electronically signed by: Kristian Bishop MD  10/24/2024   [id number]

## 2024-10-24 NOTE — ASSESSMENT & PLAN NOTE
Diabetes is improving with treatment.  But having some hypoglycemia.  She was having lows in the early afternoon.  She has stopped the lunchtime insulin dose.  Will try resuming lunch insulin at lower dose.  Diabetes will be reassessed in 6 months.    She is having trouble tolerating the ozempic.  Will send in Rx for mounjaro to see if this is better tolerated.  We can do gradual dose escalation.    FreeStyle Rubén 3 CGM was downloaded today.  Data was reviewed from 10/11/24 to 10/24/24.  This showed some day to day variability.  Some days having good overnight glucose.  Having some spikes with lunch.  Will resume lower dose mealtime insulin at lunch.

## 2024-10-31 ENCOUNTER — OFFICE VISIT (OUTPATIENT)
Dept: NEUROLOGY | Facility: CLINIC | Age: 59
End: 2024-10-31
Payer: MEDICARE

## 2024-10-31 VITALS
BODY MASS INDEX: 42.75 KG/M2 | HEIGHT: 66 IN | OXYGEN SATURATION: 96 % | DIASTOLIC BLOOD PRESSURE: 68 MMHG | SYSTOLIC BLOOD PRESSURE: 106 MMHG | WEIGHT: 266 LBS | HEART RATE: 85 BPM

## 2024-10-31 DIAGNOSIS — G25.81 RESTLESS LEGS SYNDROME (RLS): ICD-10-CM

## 2024-10-31 DIAGNOSIS — G60.9 IDIOPATHIC PERIPHERAL NEUROPATHY: Primary | ICD-10-CM

## 2024-10-31 PROCEDURE — 99213 OFFICE O/P EST LOW 20 MIN: CPT | Performed by: PSYCHIATRY & NEUROLOGY

## 2024-10-31 PROCEDURE — 1160F RVW MEDS BY RX/DR IN RCRD: CPT | Performed by: PSYCHIATRY & NEUROLOGY

## 2024-10-31 PROCEDURE — 3078F DIAST BP <80 MM HG: CPT | Performed by: PSYCHIATRY & NEUROLOGY

## 2024-10-31 PROCEDURE — 1159F MED LIST DOCD IN RCRD: CPT | Performed by: PSYCHIATRY & NEUROLOGY

## 2024-10-31 PROCEDURE — 3074F SYST BP LT 130 MM HG: CPT | Performed by: PSYCHIATRY & NEUROLOGY

## 2024-10-31 NOTE — PROGRESS NOTES
Subjective:    CC: Ivette Khalil is seen today for Idiopathic peripheral neuropathy       Current visit-patient states she has brain fog today and is feeling extremely tired because she had COVID 2 weeks ago but otherwise both her RLS and neuropathy symptoms have improved significantly since she transition from gabapentin to Lyrica 150 mg 3 times daily that she is tolerating well.  Her last A1c was 8, LDL was 144 (she could not tolerate statins in the past due to increased leg pain), B12 of 404 and B6 level of 6.4 with elevated ferritin level, no M spike, elevated LINWOOD of 1:60 with no subsequent positive antibodies.    Initial ukewv-33-ly[age]-old female accompanied by her  with a history of diabetes mellitus type 2, anxiety, hypertension, hyperlipidemia, hypothyroidism, carpal tunnel status post bilateral median release, LINDA not on CPAP presents with neuropathy.  As per patient she has excruciating pain in her legs and feet as well as numbness in her hands and feet for several years.  She also has a sensation of bugs crawling under the skin with an urge to move her legs or walk around more so at night.  In fact she does have difficulty sleeping at night because of the symptoms.  Her diabetes has been poorly controlled with her last A1c being 10.8.  Last TSH was 5.7.  She was prescribed Ozempic that she had stopped taking a few months ago as she had COVID twice but has restarted it again.  Was also prescribed gabapentin currently at 600 mg 3 times daily which is not helping much (has also tried Cymbalta and amitriptyline previously).  At times she takes 3 pills of gabapentin at a time twice a day.  She denies any alcohol intake or family history of neuropathy.     The following portions of the patient's history were reviewed today and updated as of 07/16/2024  : allergies, current medications, past family history, past medical history, past social history, past surgical history, and problem list  These  "document will be scanned to patient's chart.      Current Outpatient Medications:     Continuous Glucose Sensor (FreeStyle Rubén 3 Sensor) misc, USE AS DIRECTED AND CHANGE EVERY 14 DAYS, Disp: 6 each, Rfl: 1    hydroCHLOROthiazide (MICROZIDE) 12.5 MG capsule, Take 1 capsule by mouth Daily., Disp: 90 capsule, Rfl: 1    ibuprofen (ADVIL,MOTRIN) 200 MG tablet, Take 1 tablet by mouth Every 6 (Six) Hours As Needed for Mild Pain., Disp: , Rfl:     insulin aspart (NovoLOG) 100 UNIT/ML injection, Inject 25 Units under the skin into the appropriate area as directed 3 (Three) Times a Day., Disp: 30 mL, Rfl: 0    Insulin Degludec (Tresiba) 100 UNIT/ML solution injection, Inject 42 Units under the skin into the appropriate area as directed 2 (Two) Times a Day., Disp: , Rfl:     Insulin Pen Needle 32G X 4 MM misc, Uses 6 per day, Disp: 550 each, Rfl: 3    Insulin Syringe 31G X 5/16\" 0.5 ML misc, Use TID, Disp: 300 each, Rfl: 1    levothyroxine (SYNTHROID, LEVOTHROID) 175 MCG tablet, Take 1 tablet by mouth Every Other Day., Disp: 45 tablet, Rfl: 3    levothyroxine (SYNTHROID, LEVOTHROID) 200 MCG tablet, Take 1 tablet by mouth Every Other Day., Disp: 45 tablet, Rfl: 3    losartan (COZAAR) 50 MG tablet, TAKE ONE TABLET BY MOUTH EVERY DAY FOR BLOOD PRESSURE, Disp: 90 tablet, Rfl: 0    metoprolol succinate XL (TOPROL-XL) 50 MG 24 hr tablet, Take 1 tablet by mouth Daily., Disp: 90 tablet, Rfl: 3    omeprazole (priLOSEC) 40 MG capsule, Take 1 capsule by mouth Daily., Disp: , Rfl:     pregabalin (Lyrica) 150 MG capsule, Take 1 capsule by mouth 3 (Three) Times a Day., Disp: 90 capsule, Rfl: 5    Tirzepatide (Mounjaro) 2.5 MG/0.5ML solution pen-injector pen, Inject 0.5 mL under the skin into the appropriate area as directed 1 (One) Time Per Week., Disp: 2 mL, Rfl: 0    True Metrix Blood Glucose Test test strip, , Disp: , Rfl:    Past Medical History:   Diagnosis Date    Acute bronchitis     Anxiety     Arthritis     Blood in stool     " Broken arm     left arm broke when feel off horse around     Constipation     Delayed emergence from anesthesia     Diabetes mellitus     Disease of thyroid gland     thyroidectomy    Elevated cholesterol     GERD (gastroesophageal reflux disease)     Heart attack     unsure of when     Hiatal hernia     History of diverticulosis     History of ear infections     Hypertension     Hypothyroidism     Migraine     Obesity     PONV (postoperative nausea and vomiting)     Sleep apnea     no bipap or cpap used    Tinnitus     Wears glasses     reading glasses only      Past Surgical History:   Procedure Laterality Date    APPENDECTOMY      open    BILATERAL BREAST REDUCTION      BLADDER SURGERY      mesh removed 2nd to complications    CARDIAC CATHETERIZATION N/A 2016    Procedure: Left Heart Cath- Right radial approach. ;  Surgeon: Shalom Giron MD;  Location:  [facility];  Service:      SECTION      CHOLECYSTECTOMY      laproscopic    COLON RESECTION      sigmoidectomy with multiple post operations and packing    COLONOSCOPY      COLONOSCOPY N/A 2021    Procedure: COLONOSCOPY;  Surgeon: Brien Turner MD;  Location: [facility];  Service: Gastroenterology;  Laterality: N/A;    EYELID RETRACTION REPAIR Bilateral     HYSTERECTOMY      removal of both ovaries/complete    ORIF HUMERUS FRACTURE      REDUCTION MAMMAPLASTY      SHOULDER SURGERY Left     SIGMOIDECTOMY      THYROIDECTOMY N/A 10/24/2019    Procedure: THYROIDECTOMY TOTAL;  Surgeon: Layo Perera MD;  Location: [facility];  Service: General      Family History   Problem Relation Age of Onset    Diabetes Mother     Heart disease Mother     Hypertension Mother     Other Father         black lung    Diabetes Father     Hypertension Father     Heart disease Father     COPD Father     Diabetes Sister     Heart disease Brother     Heart attack Brother         Massive MI at 51      Social History     Socioeconomic  "History    Marital status:     Number of children: 2   Tobacco Use    Smoking status: Never     Passive exposure: Never    Smokeless tobacco: Never   Vaping Use    Vaping status: Never Used   Substance and Sexual Activity    Alcohol use: No    Drug use: Never    Sexual activity: Defer     Review of Systems   Neurological:  Positive for numbness.   All other systems reviewed and are negative.      Objective:    /68   Pulse 85   Ht 167.6 cm (66\")   Wt 121 kg (266 lb)   SpO2 96%   BMI 42.93 kg/m²     Neurology Exam:    General apperance: Obese    Mental status: Alert, awake and oriented to time place and person.    Recent and Remote memory: Intact.    Attention span and Concentration: Normal.     Language and Speech: Intact- No dysarthria.    Fluency, Naming , Repitition and Comprehension:  Intact    Cranial Nerves:   CN II: Visual fields are full. Intact. Fundi - Normal, No papillederma, Pupils - MARINA  CN III, IV and VI: Extraocular movements are intact. Normal saccades.   CN V: Facial sensation is intact.   CN VII: Muscles of facial expression reveal no asymmetry. Intact.   CN VIII: Hearing is intact. Whispered voice intact.   CN IX and X: Palate elevates symmetrically. Intact  CN XI: Shoulder shrug is intact.   CN XII: Tongue is midline without evidence of atrophy or fasciculation.     Ophthalmoscopic exam of optic disc-normal    Motor:  Right UE muscle strength 5/5. Normal tone.     Left UE muscle strength 5/5. Normal tone.      Right LE muscle strength5/5. Normal tone.     Left LE muscle strength 5/5. Normal tone.      Sensory: Reduced to pinprick in bilateral upper extremities to level of elbow and in bilateral lower extremities to level of knees with markedly reduced vibration    DTRs: 2+ bilaterally in upper and lower extremities.    Babinski: Negative bilaterally.    Co-ordination: Normal finger-to-nose, heel to shin B/L.    Rhomberg: Positive    Gait: Normal.    Cardiovascular: Regular rate " and rhythm without murmur, gallop or rub.    Assessment and Plan:  1. Idiopathic peripheral neuropathy  Patient has severe diabetic neuropathy  She should continue Lyrica 150 mg 3 times daily  A1c has improved to 8  I have told her to start taking vitamin B12 and B6 supplements as her level was on the lower side of normal      2. Restless legs syndrome (RLS)  Based on her symptoms she could have RLS in addition to her neuropathy  Lyrica has helped with her symptoms  She does not need to take iron supplements as last ferritin level was elevated    Return in about 6 months (around 4/30/2025).       Kya Kowalski MD

## 2024-11-08 NOTE — TELEPHONE ENCOUNTER
Pt is calm, cooperative and visible on milieu. Pt reports AH, depression and anxiety; denies SI/HI/AH/VH. Pt has minimal interaction with peers and reports sleeping well. Pt is able to make needs known and is medication compliant. Q 15 min checks maintained.       Spoke with patient at checkout. She forgot to tell Dr. Bishop she is going to need needles sent into the pharmacy, as well. Pt is using  [facility] in [place].    Phone: [phone] Fax: [phone]

## 2024-11-11 ENCOUNTER — TRANSCRIBE ORDERS (OUTPATIENT)
Dept: ADMINISTRATIVE | Facility: HOSPITAL | Age: 59
End: 2024-11-11
Payer: MEDICARE

## 2024-11-11 DIAGNOSIS — R10.9 ABDOMINAL PAIN, UNSPECIFIED ABDOMINAL LOCATION: Primary | ICD-10-CM

## 2025-01-13 ENCOUNTER — TELEPHONE (OUTPATIENT)
Dept: ENDOCRINOLOGY | Facility: CLINIC | Age: 60
End: 2025-01-13
Payer: MEDICARE

## 2025-01-13 RX ORDER — CALCIUM CITRATE/VITAMIN D3 200MG-6.25
TABLET ORAL
Qty: 100 EACH | Refills: 3 | Status: SHIPPED | OUTPATIENT
Start: 2025-01-13

## 2025-01-13 NOTE — TELEPHONE ENCOUNTER
Caller: Ivette Khalil    Relationship: Self    Best call back number:   Telephone Information:   Mobile [phone]     What is the best time to reach you: ANYTIME    Who are you requesting to speak with (clinical staff, provider,  specific staff member): CLINICAL STAFF / PROVIDER     What was the call regarding: PT CALLED NEEDING A NEW PRESCRIPTION SENT N FOR HER TEST STRIPS. PT STATED SHE ONLY HAS 3 LEFT. PLEASE ADVISE.

## 2025-01-20 RX ORDER — FLURBIPROFEN SODIUM 0.3 MG/ML
SOLUTION/ DROPS OPHTHALMIC
Qty: 100 EACH | Refills: 3 | Status: SHIPPED | OUTPATIENT
Start: 2025-01-20

## 2025-01-20 NOTE — TELEPHONE ENCOUNTER
Rx Refill Note  Requested Prescriptions     Pending Prescriptions Disp Refills    B-D UF III MINI PEN NEEDLES 31G X 5 MM misc [Pharmacy Med Name: BD UF MINI PEN NEEDLE [id number]]  0     Sig: USE 6 TIMES PER DAY      Last office visit with prescribing clinician: 10/24/2024   Last telemedicine visit with prescribing clinician: Visit date not found   Next office visit with prescribing clinician: 5/2/2025                         Would you like a call back once the refill request has been completed: [] Yes [] No    If the office needs to give you a call back, can they leave a voicemail: [] Yes [] No    Tahira Arthur MA  01/20/25, 09:01 EST

## 2025-02-17 DIAGNOSIS — G60.9 IDIOPATHIC PERIPHERAL NEUROPATHY: ICD-10-CM

## 2025-02-17 RX ORDER — FLURBIPROFEN SODIUM 0.3 MG/ML
SOLUTION/ DROPS OPHTHALMIC
Qty: 500 EACH | Refills: 1 | Status: SHIPPED | OUTPATIENT
Start: 2025-02-17

## 2025-02-17 RX ORDER — PREGABALIN 150 MG/1
150 CAPSULE ORAL 3 TIMES DAILY
Qty: 90 CAPSULE | Refills: 5 | Status: SHIPPED | OUTPATIENT
Start: 2025-02-17

## 2025-02-17 NOTE — TELEPHONE ENCOUNTER
Rx Refill Note  Requested Prescriptions     Pending Prescriptions Disp Refills    Insulin Pen Needle (B-D UF III MINI PEN NEEDLES) 31G X 5 MM misc [Pharmacy Med Name: BD UF MINI PEN NEEDLE [id number]] 500 each 1     Sig: USE 6 TIMES PER DAY      Last office visit with prescribing clinician: 10/24/2024     Next office visit with prescribing clinician: 5/2/2025   {Carmen Lynn MA  02/17/25, 11:38 EST

## 2025-02-17 NOTE — TELEPHONE ENCOUNTER
Rx Refill Note  Requested Prescriptions     Pending Prescriptions Disp Refills    pregabalin (Lyrica) 150 MG capsule 90 capsule 5     Sig: Take 1 capsule by mouth 3 (Three) Times a Day.      Last filled: 7/16/24 for 6 mos total    Last office visit with prescribing clinician: 10/31/2024      Next office visit with prescribing clinician: 4/30/2025     Vu Liriano MA  02/17/25, 14:34 EST

## 2025-02-17 NOTE — TELEPHONE ENCOUNTER
Caller: Ivette Khalil    Relationship: Self    Best call back number: [phone]    Requested Prescriptions:   Requested Prescriptions     Pending Prescriptions Disp Refills    pregabalin (Lyrica) 150 MG capsule 90 capsule 5     Sig: Take 1 capsule by mouth 3 (Three) Times a Day.        Pharmacy where request should be sent: [place], [place] - [address] - [phone]  - [phone] FX     Last office visit with prescribing clinician: 10/31/2024   Last telemedicine visit with prescribing clinician: Visit date not found   Next office visit with prescribing clinician: 4/30/2025     Additional details provided by patient: PT OUT OF THE MEDICATION    Does the patient have less than a 3 day supply:  [x] Yes  [] No    Would you like a call back once the refill request has been completed: [] Yes [] No    If the office needs to give you a call back, can they leave a voicemail: [] Yes [] No    Jean Carlos Noland Rep   02/17/25 10:00 EST

## 2025-03-03 NOTE — PROGRESS NOTES
Cardiology Outpatient Visit      Identification: Ivette Khalil is a 59 y.o. female who resides in [place], [place]    Reason for visit:  Dyspnea (F/U)      Subjective      Patient is a 59-year-old female returns today for follow-up of her chronic diastolic heart failure, chest pain and cardiac risk factors.  Patient was last seen in 2023. She had a normal CT angiogram in December 2023 that showed normal coronary arteries.  Her last echocardiogram in 2023 showed normal LVEF with LVH and no valvular abnormality.  According to the patient she has spent time in [place] and returned about 3 weeks ago.  While there she had gained over 20 pounds in fluid.  She followed up with her primary care provider who prescribed her Lasix.  She believes she is taking to 40 mg tablets a day.  She was also given potassium to take as well.  She reports this helped her shortness of breath and she believes she got all the fluid off but then approximately a week ago she began to get sick with nausea and vomiting.  At first thought she may have a GI bug but she has continued to feel poorly and even threw up last night.  Because of how poorly she is feeling she has not had any of her antihypertensives or Lasix for the past 3 days.  Her blood pressure is currently 100/70 despite having none of these medications.  She does not recall having any follow-up blood work after starting her Lasix.  She has uncontrolled diabetes mellitus and reports her last hemoglobin A1c was 10.7.  She was started on Mounjaro prior to going to [place] but was only able to take 2 doses.  She is not certain if her insurance is continuing to cover this or not.  She reports having extreme fatigue and could just sleep all day.    Review of Systems   Constitutional: Positive for malaise/fatigue.   Eyes:  Negative for vision loss in left eye and vision loss in right eye.   Cardiovascular:  Positive for dyspnea on exertion and leg swelling. Negative for chest  "pain, near-syncope, orthopnea, palpitations, paroxysmal nocturnal dyspnea and syncope.   Musculoskeletal:  Negative for myalgias.   Neurological:  Negative for brief paralysis, excessive daytime sleepiness, focal weakness, numbness, paresthesias and weakness.   All other systems reviewed and are negative.      Allergies   Allergen Reactions    Atorvastatin Myalgia    Penicillins Hives and Swelling     Does ok with Keflex    Morphine Hives    Prednisone Hives     Also, Swelling and skin redness    Sglt2 Inhibitors Other (See Comments)     UTI    Codeine Hives         Current Outpatient Medications   Medication Instructions    B-D UF III MINI PEN NEEDLES 31G X 5 MM misc USE 6 TIMES PER DAY    Continuous Glucose Sensor (FreeStyle Rubén 3 Sensor) misc USE AS DIRECTED AND CHANGE EVERY 14 DAYS    furosemide (LASIX) 40 mg, Oral, Daily    hydroCHLOROthiazide (MICROZIDE) 12.5 mg, Oral, Daily    ibuprofen (ADVIL,MOTRIN) 200 mg, Every 6 Hours PRN    insulin aspart (NOVOLOG) 25 Units, Subcutaneous, 3 Times Daily    Insulin Degludec (TRESIBA) 42 Units, Subcutaneous, 2 Times Daily    Insulin Pen Needle 32G X 4 MM misc Uses 6 per day    Insulin Syringe 31G X 5/16\" 0.5 ML misc Use TID    levothyroxine (SYNTHROID, LEVOTHROID) 200 mcg, Oral, Every Other Day    levothyroxine (SYNTHROID, LEVOTHROID) 175 mcg, Oral, Every Other Day    losartan (COZAAR) 50 mg, Oral, Daily, for blood pressure    metoprolol succinate XL (TOPROL-XL) 50 mg, Oral, Daily    omeprazole (PRILOSEC) 40 mg, Daily    potassium chloride 10 MEQ CR tablet 10 mEq, Oral, Daily    pregabalin (LYRICA) 150 mg, Oral, 3 Times Daily    Tirzepatide (MOUNJARO) 2.5 mg, Subcutaneous, Weekly    True Metrix Blood Glucose Test test strip Use to test blood sugar daily.         Objective     /70 (BP Location: Right arm, Patient Position: Sitting, Cuff Size: Large Adult)   Pulse 87   Ht 167.6 cm (66\")   Wt 127 kg (281 lb)   SpO2 95%   BMI 45.35 kg/m²       Constitutional:      "  Appearance: Healthy appearance. Well-developed.   Eyes:      General: Lids are normal. No scleral icterus.     Conjunctiva/sclera: Conjunctivae normal.   HENT:      Head: Normocephalic and atraumatic.   Neck:      Thyroid: No thyromegaly.      Vascular: No carotid bruit or JVD.   Pulmonary:      Effort: Pulmonary effort is normal.      Breath sounds: Normal breath sounds. No wheezing. No rhonchi. No rales.   Cardiovascular:      Normal rate. Regular rhythm.      Murmurs: There is no murmur.      No gallop.  No rub.   Pulses:     Intact distal pulses.   Edema:     Peripheral edema absent.   Abdominal:      General: There is no distension.      Palpations: Abdomen is soft. There is no abdominal mass.   Musculoskeletal:      Cervical back: Normal range of motion. Skin:     General: Skin is warm and dry.      Findings: No rash.   Neurological:      General: No focal deficit present.      Mental Status: Alert and oriented to person, place, and time.      Gait: Gait is intact.   Psychiatric:         Attention and Perception: Attention normal.         Mood and Affect: Mood normal.         Behavior: Behavior normal.         Result Review  (reviewed with patient):            Lab Results   Component Value Date    GLUCOSE 176 (H) 10/24/2024    BUN 14 10/24/2024    CREATININE 0.96 10/24/2024     10/24/2024    K 4.0 10/24/2024     10/24/2024    CALCIUM 9.1 10/24/2024    PROTEINTOT 7.0 10/24/2024    ALBUMIN 3.5 10/24/2024    ALT 28 10/24/2024    AST 22 10/24/2024    ALKPHOS 139 (H) 10/24/2024    BILITOT 0.2 10/24/2024    GLOB 3.5 10/24/2024    AGRATIO 1.0 10/24/2024    BCR 14.6 10/24/2024    ANIONGAP 9.5 10/24/2024    EGFR 67.5 10/24/2024     Lab Results   Component Value Date    WBC 7.51 09/10/2020    HGB 13.9 09/10/2020    HCT 42.3 09/10/2020    MCV 82.1 09/10/2020     09/10/2020     Lab Results   Component Value Date    CHOL 206 (H) 10/24/2024    CHLPL 213 (H) 10/30/2014    TRIG 139 10/24/2024    HDL 37 (L)  10/24/2024     (H) 10/24/2024     Lab Results   Component Value Date    HGBA1C 8.0 (A) 10/24/2024             Assessment     Diagnoses and all orders for this visit:    1. Chronic heart failure with preserved ejection fraction (HFpEF) (Primary)  Overview:  Elevated proBNP, 9/2023  Echo (11/3/2023): LVEF 60%.  LVH.  No valvular abnormality.  CT coronary angiogram (12/1/2023): Normal coronary arteries.  LVEF 83%    Assessment & Plan:  Patient reports shortness of breath that improved with Lasix  Obtain proBNP, CMP and CBC today    Orders:  -     Comprehensive Metabolic Panel; Future  -     CBC & Differential; Future  -     proBNP; Future    2. Chest pain with normal coronary angiography  Overview:  Cardiac catherization (09/26/2016): Normal coronary arteries.  LVEF 70%  Echocardiogram (9/27/2016): EF 60%. No valvular abnormalities.   Echo (11/3/2023): LVEF 60%.  LVH.  No valvular abnormality.  CT coronary angiogram (12/1/2023): Normal coronary arteries.  CAC 0.  LVEF 86%    Assessment & Plan:  Patient had normal coronary arteries.  No aspirin needed.  Not having chest pain      3. Essential hypertension  Overview:  Target blood pressure <130/80 mmHg    Assessment & Plan:  Patient has not had any of her medications for 3 days.  Blood pressure 100/70.  Would hold her HCTZ, losartan and metoprolol until blood pressure has improved.  Patient to monitor blood pressures at home.  Would add back in metoprolol and losartan and hold off on the hydrochlorothiazide      4. Hyperlipidemia LDL goal <70  Overview:  Statin therapy indicated given diabetic status    Assessment & Plan:  Defer statin therapy due to intolerance.        5. Palpitations  Overview:  Echo at [facility] (11/2020): Normal LVEF.  No significant valve abnormality  Intermittent palpitation symptoms lasting 1 minute in duration, Spring 2021  Normal EKG, 4/2/2021  2 week monitor (2020) NSR with breif SVT, one 6 beat episode VT.  Benign    Assessment &  Plan:  Patient denies palpitation      6. Other fatigue  Assessment & Plan:  Obtain CBC, CMP and TSH    Orders:  -     TSH; Future    Other orders  -     Discontinue: furosemide (LASIX) 40 MG tablet; Take 1 tablet by mouth Daily.  -     potassium chloride 10 MEQ CR tablet; Take 1 tablet by mouth Daily.  -     Discontinue: furosemide (LASIX) 40 MG tablet; Take 1 tablet by mouth 2 (Two) Times a Day.  -     furosemide (LASIX) 40 MG tablet; Take 1 tablet by mouth Daily.          Plan   Patient to remain off Lasix and her antihypertensives.  Obtain CBC, CMP, TSH and proBNP.  Patient could have hyperkalemia, be dehydrated or have abnormal thyroid levels.  Patient to monitor blood pressures at home and as blood pressure stabilized would recommend she add metoprolol and losartan back in if blood work allows.  Daily weights.  Should only use Lasix for weight gain of 1 to 2 pounds in 24 to 48 hours  Patient had normal CT coronary angiogram without any coronary calcification just barely over a year ago.  Will call patient with results once available  Patient needs better control of her diabetes and would recommend she seek insurance approval for the Mounjaro to help with better control of diabetes and with weight loss.  Discussed correlation between diabetes and obesity with diastolic heart failure.  Last echo she did have grade 2 diastolic dysfunction.  I will contact patient with results once available.  Will make further recommendations at that time      Follow-up   Return in 6 months (on 9/5/2025), or if symptoms worsen or fail to improve, for Follow-up with Dr. Dueñas next visit.      Cynthia Sharif, DAVI  3/5/2025

## 2025-03-05 ENCOUNTER — LAB (OUTPATIENT)
Dept: LAB | Facility: HOSPITAL | Age: 60
End: 2025-03-05
Payer: MEDICARE

## 2025-03-05 ENCOUNTER — OFFICE VISIT (OUTPATIENT)
Dept: CARDIOLOGY | Facility: CLINIC | Age: 60
End: 2025-03-05
Payer: MEDICARE

## 2025-03-05 VITALS
OXYGEN SATURATION: 95 % | WEIGHT: 281 LBS | BODY MASS INDEX: 45.16 KG/M2 | HEART RATE: 87 BPM | DIASTOLIC BLOOD PRESSURE: 70 MMHG | SYSTOLIC BLOOD PRESSURE: 100 MMHG | HEIGHT: 66 IN

## 2025-03-05 DIAGNOSIS — R07.9 CHEST PAIN WITH NORMAL CORONARY ANGIOGRAPHY: ICD-10-CM

## 2025-03-05 DIAGNOSIS — E78.5 HYPERLIPIDEMIA LDL GOAL <70: Chronic | ICD-10-CM

## 2025-03-05 DIAGNOSIS — R53.83 OTHER FATIGUE: ICD-10-CM

## 2025-03-05 DIAGNOSIS — R00.2 PALPITATIONS: ICD-10-CM

## 2025-03-05 DIAGNOSIS — I50.32 CHRONIC HEART FAILURE WITH PRESERVED EJECTION FRACTION (HFPEF): ICD-10-CM

## 2025-03-05 DIAGNOSIS — I50.32 CHRONIC HEART FAILURE WITH PRESERVED EJECTION FRACTION (HFPEF): Primary | ICD-10-CM

## 2025-03-05 DIAGNOSIS — I10 ESSENTIAL HYPERTENSION: Chronic | ICD-10-CM

## 2025-03-05 LAB
ALBUMIN SERPL-MCNC: 3.5 G/DL (ref 3.5–5.2)
ALBUMIN/GLOB SERPL: 0.9 G/DL
ALP SERPL-CCNC: 152 U/L (ref 39–117)
ALT SERPL W P-5'-P-CCNC: 44 U/L (ref 1–33)
ANION GAP SERPL CALCULATED.3IONS-SCNC: 9.2 MMOL/L (ref 5–15)
AST SERPL-CCNC: 45 U/L (ref 1–32)
BASOPHILS # BLD AUTO: 0.03 10*3/MM3 (ref 0–0.2)
BASOPHILS NFR BLD AUTO: 0.5 % (ref 0–1.5)
BILIRUB SERPL-MCNC: 0.3 MG/DL (ref 0–1.2)
BUN SERPL-MCNC: 14 MG/DL (ref 8–23)
BUN/CREAT SERPL: 14.6 (ref 7–25)
CALCIUM SPEC-SCNC: 8.7 MG/DL (ref 8.6–10.5)
CHLORIDE SERPL-SCNC: 103 MMOL/L (ref 98–107)
CO2 SERPL-SCNC: 27.8 MMOL/L (ref 22–29)
CREAT SERPL-MCNC: 0.96 MG/DL (ref 0.57–1)
DEPRECATED RDW RBC AUTO: 41.7 FL (ref 37–54)
EGFRCR SERPLBLD CKD-EPI 2021: 67.5 ML/MIN/1.73
EOSINOPHIL # BLD AUTO: 0.19 10*3/MM3 (ref 0–0.4)
EOSINOPHIL NFR BLD AUTO: 3.1 % (ref 0.3–6.2)
ERYTHROCYTE [DISTWIDTH] IN BLOOD BY AUTOMATED COUNT: 13.9 % (ref 12.3–15.4)
GLOBULIN UR ELPH-MCNC: 4 GM/DL
GLUCOSE SERPL-MCNC: 164 MG/DL (ref 65–99)
HCT VFR BLD AUTO: 40.4 % (ref 34–46.6)
HGB BLD-MCNC: 13.3 G/DL (ref 12–15.9)
IMM GRANULOCYTES # BLD AUTO: 0.03 10*3/MM3 (ref 0–0.05)
IMM GRANULOCYTES NFR BLD AUTO: 0.5 % (ref 0–0.5)
LYMPHOCYTES # BLD AUTO: 2.25 10*3/MM3 (ref 0.7–3.1)
LYMPHOCYTES NFR BLD AUTO: 36.7 % (ref 19.6–45.3)
MCH RBC QN AUTO: 27.4 PG (ref 26.6–33)
MCHC RBC AUTO-ENTMCNC: 32.9 G/DL (ref 31.5–35.7)
MCV RBC AUTO: 83.3 FL (ref 79–97)
MONOCYTES # BLD AUTO: 0.55 10*3/MM3 (ref 0.1–0.9)
MONOCYTES NFR BLD AUTO: 9 % (ref 5–12)
NEUTROPHILS NFR BLD AUTO: 3.08 10*3/MM3 (ref 1.7–7)
NEUTROPHILS NFR BLD AUTO: 50.2 % (ref 42.7–76)
NRBC BLD AUTO-RTO: 0 /100 WBC (ref 0–0.2)
NT-PROBNP SERPL-MCNC: 48.3 PG/ML (ref 0–900)
PLATELET # BLD AUTO: 234 10*3/MM3 (ref 140–450)
PMV BLD AUTO: 10.3 FL (ref 6–12)
POTASSIUM SERPL-SCNC: 4.1 MMOL/L (ref 3.5–5.2)
PROT SERPL-MCNC: 7.5 G/DL (ref 6–8.5)
RBC # BLD AUTO: 4.85 10*6/MM3 (ref 3.77–5.28)
SODIUM SERPL-SCNC: 140 MMOL/L (ref 136–145)
TSH SERPL DL<=0.05 MIU/L-ACNC: 5.1 UIU/ML (ref 0.27–4.2)
WBC NRBC COR # BLD AUTO: 6.13 10*3/MM3 (ref 3.4–10.8)

## 2025-03-05 PROCEDURE — 36415 COLL VENOUS BLD VENIPUNCTURE: CPT

## 2025-03-05 PROCEDURE — 83880 ASSAY OF NATRIURETIC PEPTIDE: CPT

## 2025-03-05 PROCEDURE — 85025 COMPLETE CBC W/AUTO DIFF WBC: CPT

## 2025-03-05 PROCEDURE — 80053 COMPREHEN METABOLIC PANEL: CPT

## 2025-03-05 PROCEDURE — 84443 ASSAY THYROID STIM HORMONE: CPT

## 2025-03-05 RX ORDER — POTASSIUM CHLORIDE 750 MG/1
10 TABLET, EXTENDED RELEASE ORAL DAILY
Start: 2025-03-05

## 2025-03-05 RX ORDER — FUROSEMIDE 40 MG/1
40 TABLET ORAL DAILY
Start: 2025-03-05

## 2025-03-05 RX ORDER — FUROSEMIDE 40 MG/1
40 TABLET ORAL 2 TIMES DAILY
Start: 2025-03-05 | End: 2025-03-05

## 2025-03-05 RX ORDER — FUROSEMIDE 40 MG/1
40 TABLET ORAL DAILY
Start: 2025-03-05 | End: 2025-03-05

## 2025-03-05 NOTE — ASSESSMENT & PLAN NOTE
Patient has not had any of her medications for 3 days.  Blood pressure 100/70.  Would hold her HCTZ, losartan and metoprolol until blood pressure has improved.  Patient to monitor blood pressures at home.  Would add back in metoprolol and losartan and hold off on the hydrochlorothiazide

## 2025-03-06 ENCOUNTER — TELEPHONE (OUTPATIENT)
Dept: ENDOCRINOLOGY | Facility: CLINIC | Age: 60
End: 2025-03-06
Payer: MEDICARE

## 2025-03-07 ENCOUNTER — DOCUMENTATION (OUTPATIENT)
Dept: CARDIOLOGY | Facility: CLINIC | Age: 60
End: 2025-03-07
Payer: MEDICARE

## 2025-03-07 RX ORDER — TIRZEPATIDE 2.5 MG/.5ML
INJECTION, SOLUTION SUBCUTANEOUS
Qty: 2 ML | Refills: 0 | Status: SHIPPED | OUTPATIENT
Start: 2025-03-07

## 2025-03-07 NOTE — TELEPHONE ENCOUNTER
Rx Refill Note  Requested Prescriptions     Pending Prescriptions Disp Refills    Tirzepatide (Mounjaro) 2.5 MG/0.5ML solution auto-injector [Pharmacy Med Name: MOUNJARO 2.5 MG/0.5 ML PEN] 2 mL 0     Sig: inject the contents of one pen SUBCUTANEOUSLY EVERY WEEK AS DIRECTED      Last office visit with prescribing clinician: 10/24/2024     Next office visit with prescribing clinician: 5/2/2025     Verito Brandon MA  03/07/25, 10:40 EST

## 2025-03-07 NOTE — PROGRESS NOTES
Contacted rate to let her know the results of her blood work.  Her TSH was elevated.  Her liver enzymes were also mildly elevated.  Interestingly her proBNP is negative.  I think her hypothyroidism is contributing to her fatigue.  I also think that her uncontrolled diabetes with hemoglobin A1c over 10 is causing issues.  She needs to follow-up with her primary care provider and focus on weight loss and better control of glucose as well as good control of her thyroid.  She had a CT coronary angiogram that showed no calcification.  Her proBNP is negative and not indicating acute CHF.  She will follow-up with her family doctor.      DAVI Dueñas

## 2025-03-07 NOTE — TELEPHONE ENCOUNTER
Spoke with patient.  Has not had mounjaro for over a month due to having an abscess in her stomach.  She is now feeling better now and would like to restart at the lowest dose.    She did miss 3 doses of her thyroid medicine prior to her appointment with cardiology due to a stomach virus.

## 2025-03-07 NOTE — TELEPHONE ENCOUNTER
Caller: Ivette Khalil    Relationship: Self    Best call back number: [phone]    Requested Prescriptions:  Tirzepatide (Mounjaro) 2.5 MG/0.5ML solution pen-injector pen  Requested Prescriptions     Pending Prescriptions Disp Refills    Mounjaro 2.5 MG/0.5ML solution auto-injector [Pharmacy Med Name: MOUNJARO 2.5 MG/0.5 ML PEN] 2 mL 0     Sig: inject the contents of one pen SUBCUTANEOUSLY EVERY WEEK AS DIRECTED        Pharmacy where request should be sent: [place], [place] - [address] - [phone]  - [phone] FX     Last office visit with prescribing clinician: 10/24/2024   Last telemedicine visit with prescribing clinician: Visit date not found   Next office visit with prescribing clinician: 5/2/2025     Additional details provided by patient: PT ALSO STATED SHE HAD LABS DRAWN 3/5 AND JUST GOT HER RESULTS THINKS SHE MAY NEED TO HAVE MEDICATION ADJUSTED WANTE TO SPEAK WITH SOMEONE FIRST    Does the patient have less than a 3 day supply:  [x] Yes  [] No    Would you like a call back once the refill request has been completed: [x] Yes [] No    If the office needs to give you a call back, can they leave a voicemail: [x] Yes [] No    Jean Carlos Márquez Rep   03/07/25 11:06 EST

## 2025-03-07 NOTE — TELEPHONE ENCOUNTER
Spoke with patient.  She states that she had labs done with her cardiologist on 03/05/2025 and they advised her to contact the office to see if meds needed to be adjusted due to TSH.      Also asking for a refill on Mounjaro.

## 2025-03-11 ENCOUNTER — TELEPHONE (OUTPATIENT)
Dept: ENDOCRINOLOGY | Facility: CLINIC | Age: 60
End: 2025-03-11
Payer: MEDICARE

## 2025-03-11 NOTE — TELEPHONE ENCOUNTER
Caller: CAMDEN GIRARD    Relationship to patient: SELF    Best call back number: [phone]    Patient is needing: PATIENT IS WANTING TO KNOW IF THE DOCTOR WILL PUT HER MOUNJARO THROUGH [facility]. RIGHT NOW IT IS OVER 300 DOLLARS AND SHE CAN NOT DO THAT. PLEASE LET PATIENT KNOW.

## 2025-03-12 NOTE — TELEPHONE ENCOUNTER
Spoke with patient.  She is currently taking 0.5mg dose but would like to go up to the next dose.  Okay per Dr. Bishop to increase to 1mg.  Patient needs to fill out new patient assistance forms for this year.  Sent via [employer].  Will also need her tresiba and novolog.  Verified doses.  They are correct on med list

## 2025-04-09 ENCOUNTER — TELEPHONE (OUTPATIENT)
Dept: ENDOCRINOLOGY | Facility: CLINIC | Age: 60
End: 2025-04-09

## 2025-04-09 NOTE — TELEPHONE ENCOUNTER
Caller: Ivette Khalil    Relationship: Self    Best call back number: [phone]    Which medication are you concerned about: YARELY    Who prescribed you this medication: DR MAHER    What are your concerns: PATIENT STATES THAT THIS MEDICATION NEEDS TO BE A TIER 1 OR TIER 2. ITS CURRENTLY A TIER 3 WHICH IS COSTING HER $250. PHONE # TO REACH OUT TO HER INSURANCE ABOUT [phone], [phone].

## 2025-04-09 NOTE — TELEPHONE ENCOUNTER
Patient notified that tier exception is initiated by patient.  She verbalized understanding and states she will contact her insurance.

## 2025-04-24 ENCOUNTER — OFFICE VISIT (OUTPATIENT)
Dept: NEUROLOGY | Facility: CLINIC | Age: 60
End: 2025-04-24
Payer: MEDICARE

## 2025-04-24 VITALS
HEART RATE: 80 BPM | BODY MASS INDEX: 46.64 KG/M2 | WEIGHT: 290.2 LBS | OXYGEN SATURATION: 96 % | HEIGHT: 66 IN | DIASTOLIC BLOOD PRESSURE: 90 MMHG | SYSTOLIC BLOOD PRESSURE: 134 MMHG

## 2025-04-24 DIAGNOSIS — G25.81 RESTLESS LEGS SYNDROME (RLS): ICD-10-CM

## 2025-04-24 DIAGNOSIS — G60.9 IDIOPATHIC PERIPHERAL NEUROPATHY: Primary | ICD-10-CM

## 2025-04-24 PROCEDURE — 1159F MED LIST DOCD IN RCRD: CPT | Performed by: PSYCHIATRY & NEUROLOGY

## 2025-04-24 PROCEDURE — 1160F RVW MEDS BY RX/DR IN RCRD: CPT | Performed by: PSYCHIATRY & NEUROLOGY

## 2025-04-24 PROCEDURE — 3075F SYST BP GE 130 - 139MM HG: CPT | Performed by: PSYCHIATRY & NEUROLOGY

## 2025-04-24 PROCEDURE — 99213 OFFICE O/P EST LOW 20 MIN: CPT | Performed by: PSYCHIATRY & NEUROLOGY

## 2025-04-24 PROCEDURE — 3080F DIAST BP >= 90 MM HG: CPT | Performed by: PSYCHIATRY & NEUROLOGY

## 2025-04-24 RX ORDER — MELOXICAM 15 MG/1
TABLET ORAL
COMMUNITY

## 2025-04-24 RX ORDER — ALBUTEROL SULFATE 90 UG/1
1 INHALANT RESPIRATORY (INHALATION)
COMMUNITY
Start: 2025-04-05

## 2025-04-24 RX ORDER — DOXYCYCLINE 100 MG/1
1 CAPSULE ORAL EVERY 12 HOURS SCHEDULED
COMMUNITY
Start: 2025-04-11

## 2025-04-24 RX ORDER — SEMAGLUTIDE 0.68 MG/ML
INJECTION, SOLUTION SUBCUTANEOUS
COMMUNITY

## 2025-04-24 RX ORDER — DICLOFENAC SODIUM 75 MG/1
75 TABLET, DELAYED RELEASE ORAL
COMMUNITY
Start: 2025-03-24

## 2025-04-24 RX ORDER — AZITHROMYCIN 250 MG
CAPSULE ORAL
COMMUNITY
Start: 2025-04-05

## 2025-04-24 RX ORDER — ESCITALOPRAM OXALATE 10 MG/1
TABLET ORAL
COMMUNITY

## 2025-04-24 RX ORDER — DIAZEPAM 5 MG/1
5 TABLET ORAL DAILY
COMMUNITY
Start: 2024-11-26

## 2025-04-24 RX ORDER — OMEPRAZOLE MAGNESIUM 2.5 MG/1
GRANULE, DELAYED RELEASE ORAL
COMMUNITY

## 2025-04-24 RX ORDER — HYDROCODONE BITARTRATE AND ACETAMINOPHEN 7.5; 325 MG/1; MG/1
TABLET ORAL
COMMUNITY
Start: 2025-04-21

## 2025-04-24 RX ORDER — LEVOFLOXACIN 750 MG/1
750 TABLET, FILM COATED ORAL
COMMUNITY
Start: 2025-04-21 | End: 2025-04-26

## 2025-04-24 RX ORDER — ONDANSETRON 8 MG/1
TABLET, FILM COATED ORAL
COMMUNITY

## 2025-04-24 RX ORDER — CEFDINIR 300 MG/1
1 CAPSULE ORAL EVERY 12 HOURS SCHEDULED
COMMUNITY
Start: 2025-04-11

## 2025-04-24 NOTE — PROGRESS NOTES
Subjective:    CC: Ivette Khalil is seen today for Follow-up       Current visit-she was admitted earlier this month to the hospital for pneumonia and a UTI that were both treated.  During that she was given steroids which did increase her blood glucose which has been running over 200.  Has recently been started on Mounjaro.  Her neuropathy and restless leg symptoms are both well-controlled with Lyrica 150 mg 3 times daily.  She has not yet started taking B12 or B6 supplements.      Last visit-patient states she has brain fog today and is feeling extremely tired because she had COVID 2 weeks ago but otherwise both her RLS and neuropathy symptoms have improved significantly since she transition from gabapentin to Lyrica 150 mg 3 times daily that she is tolerating well.  Her last A1c was 8, LDL was 144 (she could not tolerate statins in the past due to increased leg pain), B12 of 404 and B6 level of 6.4 with elevated ferritin level, no M spike, elevated LINWOOD of 1:60 with no subsequent positive antibodies.    Initial [id number]-old female accompanied by her  with a history of diabetes mellitus type 2, anxiety, hypertension, hyperlipidemia, hypothyroidism, carpal tunnel status post bilateral median release, LINDA not on CPAP presents with neuropathy.  As per patient she has excruciating pain in her legs and feet as well as numbness in her hands and feet for several years.  She also has a sensation of bugs crawling under the skin with an urge to move her legs or walk around more so at night.  In fact she does have difficulty sleeping at night because of the symptoms.  Her diabetes has been poorly controlled with her last A1c being 10.8.  Last TSH was 5.7.  She was prescribed Ozempic that she had stopped taking a few months ago as she had COVID twice but has restarted it again.  Was also prescribed gabapentin currently at 600 mg 3 times daily which is not helping much (has also tried Cymbalta and amitriptyline  "previously).  At times she takes 3 pills of gabapentin at a time twice a day.  She denies any alcohol intake or family history of neuropathy.     The following portions of the patient's history were reviewed today and updated as of 07/16/2024  : allergies, current medications, past family history, past medical history, past social history, past surgical history, and problem list  These document will be scanned to patient's chart.      Current Outpatient Medications:     albuterol sulfate  (90 Base) MCG/ACT inhaler, Inhale 1 puff., Disp: , Rfl:     B-D UF III MINI PEN NEEDLES 31G X 5 MM misc, USE 6 TIMES PER DAY, Disp: 500 each, Rfl: 1    Continuous Glucose Sensor (FreeStyle Rubén 3 Sensor) misc, USE AS DIRECTED AND CHANGE EVERY 14 DAYS, Disp: 6 each, Rfl: 1    diazePAM (VALIUM) 5 MG tablet, Take 1 tablet by mouth Daily., Disp: , Rfl:     diclofenac (VOLTAREN) 75 MG EC tablet, Take 1 tablet by mouth., Disp: , Rfl:     furosemide (LASIX) 40 MG tablet, Take 1 tablet by mouth Daily., Disp: , Rfl:     HYDROcodone-acetaminophen (NORCO) 7.5-325 MG per tablet, , Disp: , Rfl:     ibuprofen (ADVIL,MOTRIN) 200 MG tablet, Take 1 tablet by mouth Every 6 (Six) Hours As Needed for Mild Pain., Disp: , Rfl:     insulin aspart (NovoLOG) 100 UNIT/ML injection, Inject 25 Units under the skin into the appropriate area as directed 3 (Three) Times a Day., Disp: 30 mL, Rfl: 0    Insulin Degludec (Tresiba) 100 UNIT/ML solution injection, Inject 42 Units under the skin into the appropriate area as directed 2 (Two) Times a Day., Disp: , Rfl:     Insulin Pen Needle 32G X 4 MM misc, Uses 6 per day, Disp: 550 each, Rfl: 3    Insulin Syringe 31G X 5/16\" 0.5 ML misc, Use TID, Disp: 300 each, Rfl: 1    levothyroxine (SYNTHROID, LEVOTHROID) 175 MCG tablet, Take 1 tablet by mouth Every Other Day., Disp: 45 tablet, Rfl: 3    levothyroxine (SYNTHROID, LEVOTHROID) 200 MCG tablet, Take 1 tablet by mouth Every Other Day., Disp: 45 tablet, Rfl: 3    " losartan (COZAAR) 50 MG tablet, TAKE ONE TABLET BY MOUTH EVERY DAY FOR BLOOD PRESSURE, Disp: 90 tablet, Rfl: 0    metoprolol succinate XL (TOPROL-XL) 50 MG 24 hr tablet, Take 1 tablet by mouth Daily., Disp: 90 tablet, Rfl: 3    Mounjaro 2.5 MG/0.5ML solution auto-injector, inject the contents of one pen SUBCUTANEOUSLY EVERY WEEK AS DIRECTED, Disp: 2 mL, Rfl: 0    omeprazole (priLOSEC) 40 MG capsule, Take 1 capsule by mouth Daily., Disp: , Rfl:     ondansetron (ZOFRAN) 8 MG tablet, , Disp: , Rfl:     potassium chloride 10 MEQ CR tablet, Take 1 tablet by mouth Daily., Disp: , Rfl:     pregabalin (Lyrica) 150 MG capsule, Take 1 capsule by mouth 3 (Three) Times a Day., Disp: 90 capsule, Rfl: 5    True Metrix Blood Glucose Test test strip, Use to test blood sugar daily., Disp: 100 each, Rfl: 3    cefdinir (OMNICEF) 300 MG capsule, Take 1 capsule by mouth Every 12 (Twelve) Hours. (Patient not taking: Reported on 4/24/2025), Disp: , Rfl:     doxycycline (VIBRAMYCIN) 100 MG capsule, Take 1 capsule by mouth Every 12 (Twelve) Hours. (Patient not taking: Reported on 4/24/2025), Disp: , Rfl:     escitalopram (LEXAPRO) 10 MG tablet, Take 1 tablet every day by oral route for 30 days, for anxiety. (Patient not taking: Reported on 4/24/2025), Disp: , Rfl:     hydroCHLOROthiazide (MICROZIDE) 12.5 MG capsule, Take 1 capsule by mouth Daily. (Patient not taking: Reported on 4/24/2025), Disp: 90 capsule, Rfl: 1    levoFLOXacin (LEVAQUIN) 750 MG tablet, Take 1 tablet by mouth. (Patient not taking: Reported on 4/24/2025), Disp: , Rfl:     meloxicam (MOBIC) 15 MG tablet, , Disp: , Rfl:     Omeprazole Magnesium (PrilOSEC) 2.5 MG pack, Take  by mouth. (Patient not taking: Reported on 4/24/2025), Disp: , Rfl:     Semaglutide,0.25 or 0.5MG/DOS, (Ozempic, 0.25 or 0.5 MG/DOSE,) 2 MG/3ML solution pen-injector, as directed Subcutaneous (Patient not taking: Reported on 4/24/2025), Disp: , Rfl:     Zithromax Z-Good 250 MG tablet, Take  by mouth.  (Patient not taking: Reported on 2025), Disp: , Rfl:    Past Medical History:   Diagnosis Date    Acute bronchitis     Anxiety     Arthritis     Blood in stool     Broken arm     left arm broke when feel off horse around     Constipation     Delayed emergence from anesthesia     Diabetes mellitus     Disease of thyroid gland     thyroidectomy    Elevated cholesterol     GERD (gastroesophageal reflux disease)     Heart attack     unsure of when     Hiatal hernia     History of diverticulosis     History of ear infections     Hypertension     Hypothyroidism     Migraine     Neuropathy     In legs and arms    Obesity     PONV (postoperative nausea and vomiting)     Sleep apnea     no bipap or cpap used    Tinnitus     Wears glasses     reading glasses only      Past Surgical History:   Procedure Laterality Date    APPENDECTOMY      open    BILATERAL BREAST REDUCTION      BLADDER SURGERY      mesh removed 2nd to complications    CARDIAC CATHETERIZATION N/A 2016    Procedure: Left Heart Cath- Right radial approach. ;  Surgeon: Shalom Giron MD;  Location:  [facility];  Service:      SECTION      CHOLECYSTECTOMY      laproscopic    COLON RESECTION      sigmoidectomy with multiple post operations and packing    COLONOSCOPY      COLONOSCOPY N/A 2021    Procedure: COLONOSCOPY;  Surgeon: Brien Turner MD;  Location: [facility];  Service: Gastroenterology;  Laterality: N/A;    EYELID RETRACTION REPAIR Bilateral     HYSTERECTOMY      removal of both ovaries/complete    ORIF HUMERUS FRACTURE      REDUCTION MAMMAPLASTY      SHOULDER SURGERY Left     SIGMOIDECTOMY      THYROIDECTOMY N/A 10/24/2019    Procedure: THYROIDECTOMY TOTAL;  Surgeon: Layo Perera MD;  Location: [facility];  Service: General      Family History   Problem Relation Age of Onset    Diabetes Mother     Heart disease Mother     Hypertension Mother     Other Father         black lung    Diabetes  "Father     Hypertension Father     Heart disease Father     COPD Father     Diabetes Sister     Heart disease Brother     Heart attack Brother         Massive MI at 51      Social History     Socioeconomic History    Marital status:     Number of children: 2   Tobacco Use    Smoking status: Never     Passive exposure: Never    Smokeless tobacco: Never   Vaping Use    Vaping status: Never Used   Substance and Sexual Activity    Alcohol use: No    Drug use: Never    Sexual activity: Defer     Review of Systems   Neurological:  Positive for numbness.   All other systems reviewed and are negative.      Objective:    /90   Pulse 80   Ht 167.6 cm (66\")   Wt 132 kg (290 lb 3.2 oz)   SpO2 96%   Breastfeeding No   BMI 46.84 kg/m²     Neurology Exam:    General apperance: Obese    Mental status: Alert, awake and oriented to time place and person.    Recent and Remote memory: Intact.    Attention span and Concentration: Normal.     Language and Speech: Intact- No dysarthria.    Fluency, Naming , Repitition and Comprehension:  Intact    Cranial Nerves:   CN II: Visual fields are full. Intact. Fundi - Normal, No papillederma, Pupils - MARIAN  CN III, IV and VI: Extraocular movements are intact. Normal saccades.   CN V: Facial sensation is intact.   CN VII: Muscles of facial expression reveal no asymmetry. Intact.   CN VIII: Hearing is intact. Whispered voice intact.   CN IX and X: Palate elevates symmetrically. Intact  CN XI: Shoulder shrug is intact.   CN XII: Tongue is midline without evidence of atrophy or fasciculation.     Ophthalmoscopic exam of optic disc-normal    Motor:  Right UE muscle strength 5/5. Normal tone.     Left UE muscle strength 5/5. Normal tone.      Right LE muscle strength5/5. Normal tone.     Left LE muscle strength 5/5. Normal tone.      Sensory: Reduced to pinprick in bilateral upper extremities to level of elbow and in bilateral lower extremities to level of knees with markedly " reduced vibration    DTRs: 2+ bilaterally in upper and lower extremities.    Babinski: Negative bilaterally.    Co-ordination: Normal finger-to-nose, heel to shin B/L.    Rhomberg: Positive    Gait: Normal.    Cardiovascular: Regular rate and rhythm without murmur, gallop or rub.    Assessment and Plan:  1. Idiopathic peripheral neuropathy  Patient has severe diabetic neuropathy  She should continue Lyrica 150 mg 3 times daily  Last A1c was 8.  She will soon be starting Mounjaro  I have once again told her to start taking vitamin B12 and B6 supplements as her level was on the lower side of normal      2. Restless legs syndrome (RLS)  Based on her symptoms she could have RLS in addition to her neuropathy  Lyrica has helped with her symptoms  She does not need to take iron supplements as last ferritin level was elevated    Return in about 6 months (around 10/24/2025).       Kya Kowalski MD

## 2025-04-25 ENCOUNTER — PATIENT ROUNDING (BHMG ONLY) (OUTPATIENT)
Dept: NEUROLOGY | Facility: CLINIC | Age: 60
End: 2025-04-25
Payer: MEDICARE

## 2025-05-08 NOTE — TELEPHONE ENCOUNTER
Called the pt and she is currently taking Mounjaro 2.5/0.5. she is waiting the higher dose.    Script is pending.    Last office visit with prescribing clinician: 10-24-24      Next office visit with prescribing clinician: 8/18/2025

## 2025-05-13 RX ORDER — ACYCLOVIR 800 MG/1
TABLET ORAL
Qty: 9 EACH | Refills: 0 | Status: SHIPPED | OUTPATIENT
Start: 2025-05-13

## 2025-05-13 NOTE — TELEPHONE ENCOUNTER
Rx Refill Note  Requested Prescriptions     Pending Prescriptions Disp Refills    Continuous Glucose Sensor (FreeStyle Rubén 3 Sensor) misc [Pharmacy Med Name: FREESTYLE RUBÉN 3 SENSOR] 9 each 0     Sig: change every 14 days      Last office visit with prescribing clinician: 10/24/2024   Last telemedicine visit with prescribing clinician: Visit date not found   Next office visit with prescribing clinician: Visit date not found                         Would you like a call back once the refill request has been completed: [] Yes [] No    If the office needs to give you a call back, can they leave a voicemail: [] Yes [] No    Tahira Arthur MA  05/13/25, 09:12 EDT

## 2025-05-19 ENCOUNTER — PRIOR AUTHORIZATION (OUTPATIENT)
Dept: ENDOCRINOLOGY | Facility: CLINIC | Age: 60
End: 2025-05-19
Payer: MEDICARE

## 2025-05-19 NOTE — TELEPHONE ENCOUNTER
CAMDEN GIRARD (Key: [id number])  PA Case ID #: [id number]  Need Help? Call us at [phone]  Outcome  Approved today by [employer] 2017  PA Case: [id number], Status: Approved, Coverage Starts on: 2/15/2025 12:00:00 AM, Coverage Ends on: 5/19/2026 12:00:00 AM.  Effective Date: 2/15/2025  Authorization Expiration Date: 5/19/2026  Drug  FreeStyle Rubén 3 Sensor  ePA cloud logo  Form  [employer] Electronic PA Form (2017 NCPDP)

## 2025-06-09 ENCOUNTER — TELEPHONE (OUTPATIENT)
Dept: ENDOCRINOLOGY | Facility: CLINIC | Age: 60
End: 2025-06-09

## 2025-06-09 RX ORDER — TIRZEPATIDE 7.5 MG/.5ML
7.5 INJECTION, SOLUTION SUBCUTANEOUS
Qty: 6 ML | Refills: 3 | Status: SHIPPED | OUTPATIENT
Start: 2025-06-09

## 2025-06-09 NOTE — TELEPHONE ENCOUNTER
Caller: Ivette Khalil    Relationship to patient: Self    Best call back number: [phone]     Patient is needing: PT WOULD LIKE TO INCREASE DOSAGE OF MOUNJARO. PLEASE REVIEW AND ADVISE.

## 2025-08-06 ENCOUNTER — TELEPHONE (OUTPATIENT)
Dept: ENDOCRINOLOGY | Facility: CLINIC | Age: 60
End: 2025-08-06
Payer: MEDICARE

## 2025-08-11 ENCOUNTER — OFFICE VISIT (OUTPATIENT)
Dept: ENDOCRINOLOGY | Facility: CLINIC | Age: 60
End: 2025-08-11
Payer: MEDICARE

## 2025-08-11 VITALS
BODY MASS INDEX: 45.29 KG/M2 | DIASTOLIC BLOOD PRESSURE: 76 MMHG | SYSTOLIC BLOOD PRESSURE: 134 MMHG | OXYGEN SATURATION: 97 % | HEART RATE: 78 BPM | WEIGHT: 281.8 LBS | HEIGHT: 66 IN

## 2025-08-11 DIAGNOSIS — E11.49 DM (DIABETES MELLITUS), TYPE 2 WITH NEUROLOGICAL COMPLICATIONS: ICD-10-CM

## 2025-08-11 DIAGNOSIS — I10 ESSENTIAL HYPERTENSION: Chronic | ICD-10-CM

## 2025-08-11 DIAGNOSIS — E78.5 HYPERLIPIDEMIA LDL GOAL <70: Chronic | ICD-10-CM

## 2025-08-11 DIAGNOSIS — E11.65 UNCONTROLLED TYPE 2 DIABETES MELLITUS WITH HYPERGLYCEMIA: Primary | ICD-10-CM

## 2025-08-11 DIAGNOSIS — E89.0 POSTOPERATIVE HYPOTHYROIDISM: ICD-10-CM

## 2025-08-11 LAB
EXPIRATION DATE: ABNORMAL
EXPIRATION DATE: ABNORMAL
GLUCOSE BLDC GLUCOMTR-MCNC: 161 MG/DL (ref 70–130)
HBA1C MFR BLD: 7.5 % (ref 4.5–5.7)
Lab: ABNORMAL
Lab: ABNORMAL

## 2025-08-11 PROCEDURE — 84443 ASSAY THYROID STIM HORMONE: CPT | Performed by: INTERNAL MEDICINE

## 2025-08-11 PROCEDURE — 1159F MED LIST DOCD IN RCRD: CPT | Performed by: INTERNAL MEDICINE

## 2025-08-11 PROCEDURE — 82043 UR ALBUMIN QUANTITATIVE: CPT | Performed by: INTERNAL MEDICINE

## 2025-08-11 PROCEDURE — 3075F SYST BP GE 130 - 139MM HG: CPT | Performed by: INTERNAL MEDICINE

## 2025-08-11 PROCEDURE — 3051F HG A1C>EQUAL 7.0%<8.0%: CPT | Performed by: INTERNAL MEDICINE

## 2025-08-11 PROCEDURE — 83036 HEMOGLOBIN GLYCOSYLATED A1C: CPT | Performed by: INTERNAL MEDICINE

## 2025-08-11 PROCEDURE — 1160F RVW MEDS BY RX/DR IN RCRD: CPT | Performed by: INTERNAL MEDICINE

## 2025-08-11 PROCEDURE — 99214 OFFICE O/P EST MOD 30 MIN: CPT | Performed by: INTERNAL MEDICINE

## 2025-08-11 PROCEDURE — 3078F DIAST BP <80 MM HG: CPT | Performed by: INTERNAL MEDICINE

## 2025-08-11 PROCEDURE — 82947 ASSAY GLUCOSE BLOOD QUANT: CPT | Performed by: INTERNAL MEDICINE

## 2025-08-11 PROCEDURE — 82570 ASSAY OF URINE CREATININE: CPT | Performed by: INTERNAL MEDICINE

## 2025-08-11 RX ORDER — VALACYCLOVIR HYDROCHLORIDE 1 G/1
1000 TABLET, FILM COATED ORAL DAILY PRN
COMMUNITY

## 2025-08-11 RX ORDER — SODIUM CHLORIDE 0.9 % (FLUSH) 0.9 %
3 SYRINGE (ML) INJECTION EVERY 8 HOURS
COMMUNITY
Start: 2025-04-29

## 2025-08-11 RX ORDER — TIRZEPATIDE 10 MG/.5ML
10 INJECTION, SOLUTION SUBCUTANEOUS
Qty: 6 ML | Refills: 3 | Status: SHIPPED | OUTPATIENT
Start: 2025-08-11

## 2025-08-11 RX ORDER — CALCIUM CITRATE/VITAMIN D3 200MG-6.25
TABLET ORAL
Qty: 100 EACH | Refills: 3 | Status: SHIPPED | OUTPATIENT
Start: 2025-08-11

## 2025-08-11 RX ORDER — FLUTICASONE PROPIONATE 50 MCG
2 SPRAY, SUSPENSION (ML) NASAL DAILY PRN
COMMUNITY

## 2025-08-11 RX ORDER — FLUCONAZOLE 150 MG/1
150 TABLET ORAL DAILY PRN
COMMUNITY
Start: 2025-05-22

## 2025-08-12 ENCOUNTER — RESULTS FOLLOW-UP (OUTPATIENT)
Dept: ENDOCRINOLOGY | Facility: CLINIC | Age: 60
End: 2025-08-12
Payer: MEDICARE

## 2025-08-12 LAB
ALBUMIN UR-MCNC: <1.2 MG/DL
CREAT UR-MCNC: 23.5 MG/DL
MICROALBUMIN/CREAT UR: NORMAL MG/G{CREAT}
TSH SERPL DL<=0.05 MIU/L-ACNC: 1.61 UIU/ML (ref 0.27–4.2)

## 2025-08-22 RX ORDER — LEVOTHYROXINE SODIUM 175 UG/1
175 TABLET ORAL EVERY OTHER DAY
Qty: 45 TABLET | Refills: 2 | Status: SHIPPED | OUTPATIENT
Start: 2025-08-22

## 2025-08-22 RX ORDER — NAPROXEN SODIUM 220 MG
TABLET ORAL
Qty: 300 EACH | Refills: 1 | Status: SHIPPED | OUTPATIENT
Start: 2025-08-22

## 2025-08-22 RX ORDER — LEVOTHYROXINE SODIUM 200 UG/1
200 TABLET ORAL EVERY OTHER DAY
Qty: 45 TABLET | Refills: 2 | Status: SHIPPED | OUTPATIENT
Start: 2025-08-22

## 2025-08-25 RX ORDER — FLURBIPROFEN SODIUM 0.3 MG/ML
1 SOLUTION/ DROPS OPHTHALMIC
Qty: 500 EACH | Refills: 1 | Status: SHIPPED | OUTPATIENT
Start: 2025-08-25

## (undated) DEVICE — ENDOSCOPY PORT CONNECTOR FOR OLYMPUS® SCOPES: Brand: ERBE

## (undated) DEVICE — DISPOSABLE BIPOLAR FORCEPS 5 1/2" (14CM) SEMKIN, 0.7MM TIP AND 12 FT. (3.6M) CABLE: Brand: KIRWAN

## (undated) DEVICE — PAD GRND REM POLYHESIVE A/ DISP

## (undated) DEVICE — LUBE JELLY PK/2.75GM STRL BX/144

## (undated) DEVICE — ANTIBACTERIAL UNDYED BRAIDED (POLYGLACTIN 910), SYNTHETIC ABSORBABLE SUTURE: Brand: COATED VICRYL

## (undated) DEVICE — SUT MNCRYL PLS ANTIB UD 4/0 PS2 18IN

## (undated) DEVICE — PK MINOR SPLT 10

## (undated) DEVICE — HDRST POSTIN FM CRDL TRACH SLOT 9X8X4IN

## (undated) DEVICE — GLV SURG SENSICARE W/ALOE PF LF 8.5 STRL

## (undated) DEVICE — SKIN AFFIX SURG ADHESIVE 72/CS 0.55ML: Brand: MEDLINE

## (undated) DEVICE — DISH PETRI 3.5IN MD STRL LF

## (undated) DEVICE — VLV SXN AIR/H2O ORCAPOD3 1P/U STRL

## (undated) DEVICE — GAUZE,SPONGE,4"X4",16PLY,XRAY,STRL,LF: Brand: MEDLINE

## (undated) DEVICE — GOWN,PREVENTION PLUS,XXLARGE,STERILE: Brand: MEDLINE

## (undated) DEVICE — MAGNETIC DRAPE: Brand: DEVON

## (undated) DEVICE — Device

## (undated) DEVICE — COVER,LIGHT HANDLE,FLX,1/PK: Brand: MEDLINE INDUSTRIES, INC.

## (undated) DEVICE — BLD SCLPL SS NO24 STRL

## (undated) DEVICE — GLV SURG BIOGEL LTX PF 7 1/2

## (undated) DEVICE — TRY SKINPREP DRYPREP

## (undated) DEVICE — KITTNER SPONGE: Brand: DEROYAL

## (undated) DEVICE — HYBRID TUBING/CAP SET FOR OLYMPUS® SCOPES: Brand: ERBE